# Patient Record
Sex: FEMALE | Race: WHITE | NOT HISPANIC OR LATINO | Employment: UNEMPLOYED | URBAN - METROPOLITAN AREA
[De-identification: names, ages, dates, MRNs, and addresses within clinical notes are randomized per-mention and may not be internally consistent; named-entity substitution may affect disease eponyms.]

---

## 2017-08-30 ENCOUNTER — OFFICE VISIT (OUTPATIENT)
Dept: FAMILY MEDICINE CLINIC | Facility: CLINIC | Age: 47
End: 2017-08-30

## 2017-08-30 VITALS
BODY MASS INDEX: 21.12 KG/M2 | DIASTOLIC BLOOD PRESSURE: 74 MMHG | HEART RATE: 72 BPM | WEIGHT: 114.8 LBS | SYSTOLIC BLOOD PRESSURE: 122 MMHG | TEMPERATURE: 98.2 F | OXYGEN SATURATION: 98 % | HEIGHT: 62 IN

## 2017-08-30 DIAGNOSIS — R06.83 SNORING: ICD-10-CM

## 2017-08-30 DIAGNOSIS — J20.9 ACUTE BRONCHITIS, UNSPECIFIED ORGANISM: ICD-10-CM

## 2017-08-30 DIAGNOSIS — J30.9 ALLERGIC RHINITIS, UNSPECIFIED ALLERGIC RHINITIS TRIGGER, UNSPECIFIED RHINITIS SEASONALITY: Primary | ICD-10-CM

## 2017-08-30 DIAGNOSIS — J01.00 ACUTE MAXILLARY SINUSITIS, RECURRENCE NOT SPECIFIED: ICD-10-CM

## 2017-08-30 DIAGNOSIS — R40.0 DAYTIME SOMNOLENCE: ICD-10-CM

## 2017-08-30 PROCEDURE — 99213 OFFICE O/P EST LOW 20 MIN: CPT | Performed by: NURSE PRACTITIONER

## 2017-08-30 RX ORDER — AZITHROMYCIN 250 MG/1
TABLET, FILM COATED ORAL
Qty: 6 TABLET | Refills: 0 | Status: SHIPPED | OUTPATIENT
Start: 2017-08-30 | End: 2017-10-20

## 2017-08-30 RX ORDER — FLUTICASONE PROPIONATE 50 MCG
2 SPRAY, SUSPENSION (ML) NASAL DAILY
COMMUNITY

## 2017-08-30 RX ORDER — AMOXICILLIN 875 MG/1
875 TABLET, COATED ORAL 2 TIMES DAILY
Qty: 20 TABLET | Refills: 0 | Status: SHIPPED | OUTPATIENT
Start: 2017-08-30 | End: 2017-08-30

## 2017-08-30 NOTE — PROGRESS NOTES
Subjective   Aleena Wilder is a 47 y.o. female.     History of Present Illness     4 day hx fever, chills, cough, chest tightness, PND, nasal congestion, purulent rhinorrhea and sputum.  Life-long pattern of acute bronchitis every spring and fall.  Sees William Amanda MD for allergy tx.      C/o's snoring/daytime somnolence.   believes she has RENETTA--witnesses apneic periods--can't tell me how long they last.  Sleep study 8 yrs ago neg for RENETTA.        The following portions of the patient's history were reviewed and updated as appropriate: allergies, current medications, past family history, past medical history, past social history, past surgical history and problem list.    Review of Systems   Constitutional: Positive for activity change, appetite change, chills, fatigue and fever.   HENT: Positive for congestion, postnasal drip, rhinorrhea and sore throat.    Respiratory: Positive for cough and chest tightness. Negative for wheezing.    All other systems reviewed and are negative.      Objective   Physical Exam   Constitutional: Vital signs are normal. She appears well-developed and well-nourished.  Non-toxic appearance.   HENT:   Head: Normocephalic and atraumatic.   Right Ear: Tympanic membrane is retracted. Tympanic membrane is not erythematous.   Left Ear: Tympanic membrane is retracted. Tympanic membrane is not erythematous.   Nose: Mucosal edema and rhinorrhea present.   Mouth/Throat: Uvula is midline. Posterior oropharyngeal erythema present.   Cardiovascular: Normal rate, regular rhythm, S1 normal, S2 normal and normal heart sounds.    No murmur heard.  Pulmonary/Chest: Effort normal and breath sounds normal. No accessory muscle usage. No respiratory distress. She has no decreased breath sounds. She has no wheezes. She has no rhonchi. She has no rales.   Lymphadenopathy:     She has no cervical adenopathy.   Neurological: She is alert.   Psychiatric: She has a normal mood and affect.   Nursing  note and vitals reviewed.      Assessment/Plan   Aleena was seen today for nasal congestion.    Diagnoses and all orders for this visit:    Allergic rhinitis, unspecified allergic rhinitis trigger, unspecified rhinitis seasonality  -     Ambulatory Referral to Allergy    Snoring  -     Ambulatory Referral to Sleep Medicine    Daytime somnolence  -     Ambulatory Referral to Sleep Medicine    Acute maxillary sinusitis, recurrence not specified    Acute bronchitis, unspecified organism    Other orders  -     Discontinue: amoxicillin (AMOXIL) 875 MG tablet; Take 1 tablet by mouth 2 (Two) Times a Day.  -     azithromycin (ZITHROMAX Z-KENDALL) 250 MG tablet; Take 2 tablets the first day, then 1 tablet daily for 4 days.  -     mometasone-formoterol (DULERA) 100-5 MCG/ACT inhaler; Inhale 2 puffs 2 (Two) Times a Day.        Reactive Airway Disease (RAD) VS Asthma    Supportive care.  RTC if sxs do not improve as expected.

## 2017-10-20 ENCOUNTER — HOSPITAL ENCOUNTER (OUTPATIENT)
Dept: GENERAL RADIOLOGY | Facility: HOSPITAL | Age: 47
Discharge: HOME | End: 2017-10-20
Admitting: FAMILY MEDICINE

## 2017-10-20 ENCOUNTER — OFFICE VISIT (OUTPATIENT)
Dept: FAMILY MEDICINE CLINIC | Facility: CLINIC | Age: 47
End: 2017-10-20

## 2017-10-20 VITALS
BODY MASS INDEX: 21.35 KG/M2 | HEIGHT: 62 IN | SYSTOLIC BLOOD PRESSURE: 120 MMHG | OXYGEN SATURATION: 99 % | TEMPERATURE: 98.2 F | DIASTOLIC BLOOD PRESSURE: 78 MMHG | RESPIRATION RATE: 18 BRPM | HEART RATE: 77 BPM | WEIGHT: 116 LBS

## 2017-10-20 DIAGNOSIS — J40 BRONCHITIS: ICD-10-CM

## 2017-10-20 DIAGNOSIS — J45.31 MILD PERSISTENT REACTIVE AIRWAY DISEASE WITH ACUTE EXACERBATION: Primary | ICD-10-CM

## 2017-10-20 DIAGNOSIS — J30.9 CHRONIC ALLERGIC RHINITIS, UNSPECIFIED SEASONALITY, UNSPECIFIED TRIGGER: ICD-10-CM

## 2017-10-20 PROCEDURE — 71020 HC CHEST PA AND LATERAL: CPT

## 2017-10-20 PROCEDURE — 99214 OFFICE O/P EST MOD 30 MIN: CPT | Performed by: FAMILY MEDICINE

## 2017-10-20 RX ORDER — AMOXICILLIN AND CLAVULANATE POTASSIUM 875; 125 MG/1; MG/1
TABLET, FILM COATED ORAL
Qty: 14 TABLET | Refills: 0 | Status: SHIPPED | OUTPATIENT
Start: 2017-10-20 | End: 2018-01-02

## 2017-10-20 RX ORDER — ALBUTEROL SULFATE 90 UG/1
2 AEROSOL, METERED RESPIRATORY (INHALATION) EVERY 4 HOURS PRN
Qty: 1 INHALER | Refills: 11 | Status: SHIPPED | OUTPATIENT
Start: 2017-10-20 | End: 2018-06-18

## 2017-10-20 RX ORDER — PREDNISONE 10 MG/1
TABLET ORAL
Qty: 18 TABLET | Refills: 0 | Status: SHIPPED | OUTPATIENT
Start: 2017-10-20 | End: 2018-01-02

## 2017-10-20 NOTE — PROGRESS NOTES
"Subjective   Aleena Wilder is a 47 y.o. female.     History of Present Illness Clear nasal drainage, cough, tight chest. Soreness in the back. Slight sputum. Slight wheeze. Has Dulera. Marta T told her thinks she has asthma    . Feels like never got better from 1.5 months ago.ACtivity would wind her.Occ would hear wheezing. Does not have albuterol. NOt sure if she ever had that bc son has an inhaler..  2 days ago chest got more tight. Got more worn out. No sore throat. No sinus pressure. Has had 2 episodes of pneumonia but not in the hosp. First time 10 yr ago and last time 5 yr ago.    Sleep lab appt is next month.  The following portions of the patient's history were reviewed and updated as appropriate: allergies, current medications, past social history and problem list.    Review of Systems   Constitutional: Positive for fatigue. Negative for activity change and unexpected weight change.   HENT: Positive for congestion. Negative for ear pain, postnasal drip and sore throat.    Eyes: Negative for pain and discharge.   Respiratory: Positive for cough, shortness of breath and wheezing. Negative for chest tightness.         Very little sputum   Cardiovascular: Negative for chest pain and palpitations.   Gastrointestinal: Negative for abdominal pain, diarrhea and vomiting.   Endocrine: Negative.    Genitourinary: Negative.    Musculoskeletal: Negative for joint swelling.   Skin: Negative for color change, rash and wound.   Allergic/Immunologic: Negative.    Neurological: Negative for seizures and syncope.   Psychiatric/Behavioral: Negative.        Objective   /78  Pulse 77  Temp 98.2 °F (36.8 °C)  Resp 18  Ht 62\" (157.5 cm)  Wt 116 lb (52.6 kg)  SpO2 99%  BMI 21.22 kg/m2  Physical Exam   Constitutional: She appears well-developed and well-nourished.   HENT:   Head: Normocephalic and atraumatic.   Right Ear: Tympanic membrane, external ear and ear canal normal.   Left Ear: Tympanic membrane, external " ear and ear canal normal.   Mouth/Throat: Oropharynx is clear and moist.   Eyes: Conjunctivae are normal. Right eye exhibits no discharge. Left eye exhibits no discharge.   Neck: Normal range of motion. Neck supple. No thyromegaly present.   Cardiovascular: Normal rate, regular rhythm and normal heart sounds.    Pulmonary/Chest: Effort normal and breath sounds normal. No respiratory distress. She has no wheezes. She has no rales.   Prolonged E:I ratio   Lymphadenopathy:     She has no cervical adenopathy.   Skin: Skin is warm and dry.   Psychiatric: She has a normal mood and affect. Judgment normal.   Vitals reviewed.      Assessment/Plan   Problem List Items Addressed This Visit        Respiratory    Allergic rhinitis      Other Visit Diagnoses     Mild persistent reactive airway disease with acute exacerbation    -  Primary    Relevant Medications    albuterol (PROVENTIL HFA;VENTOLIN HFA) 108 (90 Base) MCG/ACT inhaler    predniSONE (DELTASONE) 10 MG tablet    Bronchitis        Relevant Medications    albuterol (PROVENTIL HFA;VENTOLIN HFA) 108 (90 Base) MCG/ACT inhaler    amoxicillin-clavulanate (AUGMENTIN) 875-125 MG per tablet    predniSONE (DELTASONE) 10 MG tablet    Other Relevant Orders    XR Chest 2 View           May need allergy testing by Dr Amanda in the near future and has an appt to discuss. Of course no testing while on steroids.

## 2017-10-24 ENCOUNTER — OFFICE VISIT (OUTPATIENT)
Dept: SLEEP MEDICINE | Facility: HOSPITAL | Age: 47
End: 2017-10-24
Attending: PSYCHIATRY & NEUROLOGY

## 2017-10-24 VITALS
BODY MASS INDEX: 20.32 KG/M2 | HEART RATE: 74 BPM | HEIGHT: 64 IN | DIASTOLIC BLOOD PRESSURE: 75 MMHG | WEIGHT: 119 LBS | SYSTOLIC BLOOD PRESSURE: 117 MMHG

## 2017-10-24 DIAGNOSIS — R06.83 SNORING: ICD-10-CM

## 2017-10-24 DIAGNOSIS — R40.0 DAYTIME SOMNOLENCE: ICD-10-CM

## 2017-10-24 DIAGNOSIS — G47.33 OSA (OBSTRUCTIVE SLEEP APNEA): Primary | ICD-10-CM

## 2017-10-24 PROCEDURE — G0463 HOSPITAL OUTPT CLINIC VISIT: HCPCS

## 2017-10-24 NOTE — PROGRESS NOTES
Sleep Medicine initial Consultation    Aleena Wilder  : 1970  47 y.o. female   Date of Service: 10/24/2017  Referring provider: GOMEZ Jennings    History Of Present Illness:   Mrs. Aleena Wilder  is a 47 y.o. patient is here for the evaluation of Snoring, Excessive Daytime Sleepiness, Sleep Apnea and Chronic Fatigue.    The patient c/o excessive daytime sleepiness,  and difficulty driving to to sleepiness.    The patient complains of loud in all sleeping positions, has witnessed episodes of sleep apnea, has dry mouth or sore mouth when he wakes up and excessive sweating during sleep.    The patient complains of bruxism during sleep.    The patient complains of has restless sleep, Does not feel rested even after a long sleep and Still feels sleepy even when increasing sleep time.    The patient reports history of frequent nightmares    Sleep schedule: Bedtime: 12 midnight, gets out of bed at 8 AM, sleep latency: 15 minutes, Gets about 8 hours of sleep.    [place] Sleepiness Scale score: 13/24.    History reviewed. No pertinent past medical history.  Past Surgical History:   Procedure Laterality Date   • APPENDECTOMY     • HYSTERECTOMY      endometriosis at age 36     Current Outpatient Prescriptions on File Prior to Visit   Medication Sig Dispense Refill   • albuterol (PROVENTIL HFA;VENTOLIN HFA) 108 (90 Base) MCG/ACT inhaler Inhale 2 puffs Every 4 (Four) Hours As Needed for Wheezing. 1 inhaler 11   • amoxicillin-clavulanate (AUGMENTIN) 875-125 MG per tablet 1 bid with food 14 tablet 0   • fluticasone (FLONASE) 50 MCG/ACT nasal spray 2 sprays into each nostril Daily.     • mometasone-formoterol (DULERA) 100-5 MCG/ACT inhaler Inhale 2 puffs 2 (Two) Times a Day. 1 inhaler 11   • predniSONE (DELTASONE) 10 MG tablet 3 for 3d,2 for 3d, 1 for 3d 18 tablet 0     No current facility-administered medications on file prior to visit.      Allergies   Allergen Reactions   • Tramadol Nausea Only     Other  "reaction(s): Dizziness, Hallucinations     Family History   Problem Relation Age of Onset   • Cancer Other      colon   • Diabetes Other    • Hyperlipidemia Other    • Hypertension Other    • Other Other      cerebraal artery occusion with cerebral infarction   • Colon cancer Paternal Grandfather      Late 60s or early 70s   • Colon cancer Cousin      Colon cancer, 50s, paternal cousin     Social History     Social History   • Marital status:      Spouse name: N/A   • Number of children: N/A   • Years of education: N/A     Occupational History   • Not on file.     Social History Main Topics   • Smoking status: Never Smoker   • Smokeless tobacco: Not on file   • Alcohol use Yes      Comment: social   • Drug use: Defer   • Sexual activity: Defer     Other Topics Concern   • Not on file     Social History Narrative     Review of Systems   HENT: Positive for congestion, postnasal drip, rhinorrhea and voice change.    Respiratory: Positive for apnea, cough, chest tightness, shortness of breath and wheezing.    Psychiatric/Behavioral: Positive for dysphoric mood. The patient is nervous/anxious.    All other systems reviewed and are negative.    Patient examination:  Vitals:    10/24/17 0700   BP: 117/75   Pulse: 74   Weight: 119 lb (54 kg)   Height: 64\" (162.6 cm)     HEENT: Normal and Mallampati Class I: Soft palate, fauces, uvula, pillars visible  Neck: Supple no carotid bruits.  Lungs: Clear to auscultation.  Cardiac exam: Normal and regular rate and rhythm. No murmurs.  Extremities: No edema clubbing or cyanosis.    ASSESSMENT AND PLAN:The patient has symptoms of obstructive sleep apnea syndrome with hypersomnia.  Her [place] Sleepiness Scale score is 13 today.  We will proceed with polysomnography and treated with CPAP therapy if needed.  Sleep hygiene techniques discussed.  Exercise diet and weight loss discussed.    Encounter Diagnoses   Name Primary?   • RENTETA (obstructive sleep apnea) Yes   • Snoring    • " Daytime somnolence      Orders Placed This Encounter   Procedures   • Polysomnography 4 or More Parameters   • Home Sleep Study     Return in about 3 months (around 1/24/2018).    Dewayne Angela MD  10/24/2017  9:05 AM

## 2018-01-02 ENCOUNTER — OFFICE VISIT (OUTPATIENT)
Dept: INTERNAL MEDICINE | Facility: CLINIC | Age: 48
End: 2018-01-02

## 2018-01-02 VITALS
SYSTOLIC BLOOD PRESSURE: 112 MMHG | BODY MASS INDEX: 19.02 KG/M2 | HEIGHT: 64 IN | RESPIRATION RATE: 16 BRPM | OXYGEN SATURATION: 98 % | WEIGHT: 111.4 LBS | DIASTOLIC BLOOD PRESSURE: 72 MMHG | TEMPERATURE: 98.8 F | HEART RATE: 77 BPM

## 2018-01-02 DIAGNOSIS — R53.82 CHRONIC FATIGUE: ICD-10-CM

## 2018-01-02 DIAGNOSIS — Z13.21 ENCOUNTER FOR VITAMIN DEFICIENCY SCREENING: ICD-10-CM

## 2018-01-02 DIAGNOSIS — Z23 NEED FOR VACCINATION: ICD-10-CM

## 2018-01-02 DIAGNOSIS — M54.50 CHRONIC MIDLINE LOW BACK PAIN WITHOUT SCIATICA: ICD-10-CM

## 2018-01-02 DIAGNOSIS — Z00.00 ENCOUNTER FOR PREVENTIVE HEALTH EXAMINATION: Primary | ICD-10-CM

## 2018-01-02 DIAGNOSIS — G89.29 CHRONIC MIDLINE LOW BACK PAIN WITHOUT SCIATICA: ICD-10-CM

## 2018-01-02 PROBLEM — J45.20 MILD INTERMITTENT ASTHMA WITHOUT COMPLICATION: Status: ACTIVE | Noted: 2018-01-02

## 2018-01-02 PROCEDURE — 90471 IMMUNIZATION ADMIN: CPT | Performed by: INTERNAL MEDICINE

## 2018-01-02 PROCEDURE — 90715 TDAP VACCINE 7 YRS/> IM: CPT | Performed by: INTERNAL MEDICINE

## 2018-01-02 PROCEDURE — 99396 PREV VISIT EST AGE 40-64: CPT | Performed by: INTERNAL MEDICINE

## 2018-01-02 RX ORDER — FEXOFENADINE HYDROCHLORIDE 60 MG/1
60 TABLET, FILM COATED ORAL DAILY
COMMUNITY
End: 2018-05-31
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
## 2018-01-03 ENCOUNTER — RESULTS ENCOUNTER (OUTPATIENT)
Dept: INTERNAL MEDICINE | Facility: CLINIC | Age: 48
End: 2018-01-03

## 2018-01-03 DIAGNOSIS — Z00.00 ENCOUNTER FOR PREVENTIVE HEALTH EXAMINATION: ICD-10-CM

## 2018-01-03 DIAGNOSIS — R53.82 CHRONIC FATIGUE: ICD-10-CM

## 2018-01-03 DIAGNOSIS — Z13.21 ENCOUNTER FOR VITAMIN DEFICIENCY SCREENING: ICD-10-CM

## 2018-01-04 LAB
25(OH)D3+25(OH)D2 SERPL-MCNC: 30.9 NG/ML (ref 30–100)
ALBUMIN SERPL-MCNC: 4.8 G/DL (ref 3.5–5.2)
ALBUMIN/GLOB SERPL: 1.7 G/DL
ALP SERPL-CCNC: 87 U/L (ref 39–117)
ALT SERPL-CCNC: 21 U/L (ref 1–33)
AST SERPL-CCNC: 21 U/L (ref 1–32)
BASOPHILS # BLD AUTO: 0.05 10*3/MM3 (ref 0–0.2)
BASOPHILS NFR BLD AUTO: 0.7 % (ref 0–1.5)
BILIRUB SERPL-MCNC: 0.5 MG/DL (ref 0.1–1.2)
BUN SERPL-MCNC: 12 MG/DL (ref 6–20)
BUN/CREAT SERPL: 15.4 (ref 7–25)
CALCIUM SERPL-MCNC: 9.8 MG/DL (ref 8.6–10.5)
CHLORIDE SERPL-SCNC: 100 MMOL/L (ref 98–107)
CHOLEST SERPL-MCNC: 230 MG/DL (ref 0–200)
CO2 SERPL-SCNC: 29.4 MMOL/L (ref 22–29)
CREAT SERPL-MCNC: 0.78 MG/DL (ref 0.57–1)
EOSINOPHIL # BLD AUTO: 0.12 10*3/MM3 (ref 0–0.7)
EOSINOPHIL NFR BLD AUTO: 1.8 % (ref 0.3–6.2)
ERYTHROCYTE [DISTWIDTH] IN BLOOD BY AUTOMATED COUNT: 13.4 % (ref 11.7–13)
GLOBULIN SER CALC-MCNC: 2.8 GM/DL
GLUCOSE SERPL-MCNC: 96 MG/DL (ref 65–99)
HCT VFR BLD AUTO: 42.8 % (ref 35.6–45.5)
HDLC SERPL-MCNC: 71 MG/DL (ref 40–60)
HGB BLD-MCNC: 13.7 G/DL (ref 11.9–15.5)
IMM GRANULOCYTES # BLD: 0 10*3/MM3 (ref 0–0.03)
IMM GRANULOCYTES NFR BLD: 0 % (ref 0–0.5)
LDLC SERPL CALC-MCNC: 140 MG/DL (ref 0–100)
LYMPHOCYTES # BLD AUTO: 1.88 10*3/MM3 (ref 0.9–4.8)
LYMPHOCYTES NFR BLD AUTO: 28.1 % (ref 19.6–45.3)
MCH RBC QN AUTO: 30.1 PG (ref 26.9–32)
MCHC RBC AUTO-ENTMCNC: 32 G/DL (ref 32.4–36.3)
MCV RBC AUTO: 94.1 FL (ref 80.5–98.2)
MONOCYTES # BLD AUTO: 0.37 10*3/MM3 (ref 0.2–1.2)
MONOCYTES NFR BLD AUTO: 5.5 % (ref 5–12)
NEUTROPHILS # BLD AUTO: 4.26 10*3/MM3 (ref 1.9–8.1)
NEUTROPHILS NFR BLD AUTO: 63.9 % (ref 42.7–76)
PLATELET # BLD AUTO: 268 10*3/MM3 (ref 140–500)
POTASSIUM SERPL-SCNC: 3.8 MMOL/L (ref 3.5–5.2)
PROT SERPL-MCNC: 7.6 G/DL (ref 6–8.5)
RBC # BLD AUTO: 4.55 10*6/MM3 (ref 3.9–5.2)
SODIUM SERPL-SCNC: 143 MMOL/L (ref 136–145)
T4 FREE SERPL-MCNC: 1.21 NG/DL (ref 0.93–1.7)
TRIGL SERPL-MCNC: 96 MG/DL (ref 0–150)
TSH SERPL DL<=0.005 MIU/L-ACNC: 0.98 MIU/ML (ref 0.27–4.2)
VLDLC SERPL CALC-MCNC: 19.2 MG/DL (ref 5–40)
WBC # BLD AUTO: 6.68 10*3/MM3 (ref 4.5–10.7)

## 2018-01-17 ENCOUNTER — APPOINTMENT (OUTPATIENT)
Dept: WOMENS IMAGING | Facility: HOSPITAL | Age: 48
End: 2018-01-17

## 2018-01-17 PROCEDURE — 77067 SCR MAMMO BI INCL CAD: CPT | Performed by: RADIOLOGY

## 2018-01-17 PROCEDURE — 77063 BREAST TOMOSYNTHESIS BI: CPT | Performed by: RADIOLOGY

## 2018-01-18 ENCOUNTER — HOSPITAL ENCOUNTER (OUTPATIENT)
Dept: SLEEP MEDICINE | Facility: HOSPITAL | Age: 48
Discharge: HOME | End: 2018-01-18
Attending: PSYCHIATRY & NEUROLOGY | Admitting: PSYCHIATRY & NEUROLOGY

## 2018-01-18 DIAGNOSIS — R06.83 SNORING: ICD-10-CM

## 2018-01-18 DIAGNOSIS — R40.0 DAYTIME SOMNOLENCE: ICD-10-CM

## 2018-01-18 DIAGNOSIS — G47.33 OSA (OBSTRUCTIVE SLEEP APNEA): ICD-10-CM

## 2018-01-18 PROCEDURE — 95811 POLYSOM 6/>YRS CPAP 4/> PARM: CPT

## 2018-01-23 ENCOUNTER — TELEPHONE (OUTPATIENT)
Dept: SLEEP MEDICINE | Facility: HOSPITAL | Age: 48
End: 2018-01-23

## 2018-01-23 NOTE — TELEPHONE ENCOUNTER
Spoke with pt about sleep study results, sending order to naps per patient. Scheduled follow up appointment

## 2018-03-27 ENCOUNTER — APPOINTMENT (OUTPATIENT)
Dept: SLEEP MEDICINE | Facility: HOSPITAL | Age: 48
End: 2018-03-27
Attending: PSYCHIATRY & NEUROLOGY

## 2018-04-24 ENCOUNTER — OFFICE VISIT (OUTPATIENT)
Dept: SLEEP MEDICINE | Facility: HOSPITAL | Age: 48
End: 2018-04-24
Attending: PSYCHIATRY & NEUROLOGY

## 2018-04-24 VITALS
BODY MASS INDEX: 21.16 KG/M2 | SYSTOLIC BLOOD PRESSURE: 95 MMHG | DIASTOLIC BLOOD PRESSURE: 64 MMHG | HEART RATE: 83 BPM | WEIGHT: 115 LBS | HEIGHT: 62 IN

## 2018-04-24 DIAGNOSIS — R06.83 SNORING: ICD-10-CM

## 2018-04-24 DIAGNOSIS — R40.0 DAYTIME SOMNOLENCE: ICD-10-CM

## 2018-04-24 DIAGNOSIS — G47.33 OSA (OBSTRUCTIVE SLEEP APNEA): Primary | ICD-10-CM

## 2018-04-24 PROCEDURE — G0463 HOSPITAL OUTPT CLINIC VISIT: HCPCS

## 2018-04-24 NOTE — PROGRESS NOTES
"Sleep Medicine Follow-up visit Note    Name: Aleena Wilder  Age: 47 y.o.  Sex: female  :  1970  MRN: [id number]  Date of Service: 2018    Requesting Physician: Dewayne Angela Md  [address] [place] [id number]  .       History of Present Illness:   Aleena Wilder is a 47 y.o. female comes for a follow up visit.     Patients Polysomnography has revealed RENETTA with an AHI of 19.2 per hour of sleep. minimum SPO2  was 84 %. [place] Sleepiness Scale score prior to CPAP therapy was 13/24.    The patient has Moderately obstructive sleep apnea. The patient is on auto CPAP therapy at a mean pressure of 7.9 cms of water. Residual AHI 3.9/ sleep hour. Compliance data to indicate 83.3% usage for more than 4 hours with an average usage of 6 hours and 13 minutes.  [place] sleepiness scale score with CPAP therapy is 15/24 with CPAP therapy.     Review of Systems - Negative except anxiety.    PMH, Surgical Hx, current Medications, Allergies, Family Hx, Social Hx and problem list were reviewed and updated in the chart.    Objective:  Vitals:    18 1136   BP: 95/64   Pulse: 83   Weight: 52.2 kg (115 lb)   Height: 157.5 cm (62\")    Body mass index is 21.03 kg/m².       GEN: No significant distress, the patient is well developed, well nourished.  HEENT: pupils equal, round and reactive to light, extraocular movements intact, conjunctiva not injected bilaterally, nasopharyngeal mucosa moist, no lesions appreciated, Mallampati score I (soft palate, uvula, fauces, and tonsillar pillars visible)  NECK: Supple, no jugular venous distention, no thyromegaly, no bruits appreciated  LUNGS: Clear to auscultation bilaterally.    CV: regular rate and rhythm, no gallops, murmurs or rubs  EXT: no cyanosis, clubbing, or edema   NEURO: alert and oriented x 3, motor functions grossly intact, CN II-XII grossly intact.    The patient's polysomnography study in 2018 revealed:1. Moderately  severe " Obstructive sleep apnea with an Apnea hypopnea index of 19.2 events per sleep hour. The lowest oxygen saturation was 84.0%. The oxygen was below 90% for 2.7% total sleep time. 2. With CPAP / BIPAP treatment, this study revealed improvement in sleep architecture, respiratory events and hypoxia and the best improvement was seen at a pressure of 8 centimeters of water.  At this pressure the Apnea/ hypopnea Index was 0.8 per sleep hour and the Oxygen Saturation remained 93.4% or above.     Assessment and PLAN:   The patient has moderately severe obstructive sleep apnea syndrome and is on CPAP. The patient is compliant and is benefiting from it.  I will continue present CPAP therapy and will see the patient for follow-up in one year.    I have discussed the findings of my evaluation with the patient at length, instructed the patient on basic sleep hygiene, stressing the importance of regular sleep schedule without naps and with 8 hours of sleep per night, avoiding alcohol and sedative medications, limiting caffeine consumption, and implementing a healthy diet and exercise program and not to drive if patient is sleepy.       Dewayne Angela MD  4/24/2018  11:42 AM

## 2018-05-31 ENCOUNTER — OFFICE VISIT (OUTPATIENT)
Dept: INTERNAL MEDICINE | Facility: CLINIC | Age: 48
End: 2018-05-31

## 2018-05-31 VITALS
HEART RATE: 67 BPM | TEMPERATURE: 98.8 F | BODY MASS INDEX: 21.2 KG/M2 | HEIGHT: 62 IN | DIASTOLIC BLOOD PRESSURE: 70 MMHG | WEIGHT: 115.2 LBS | OXYGEN SATURATION: 94 % | SYSTOLIC BLOOD PRESSURE: 108 MMHG | RESPIRATION RATE: 16 BRPM

## 2018-05-31 DIAGNOSIS — R10.31 RIGHT LOWER QUADRANT ABDOMINAL PAIN: Primary | ICD-10-CM

## 2018-05-31 PROBLEM — N80.9 ENDOMETRIOSIS: Status: ACTIVE | Noted: 2018-05-31

## 2018-05-31 LAB
ALBUMIN SERPL-MCNC: 4.9 G/DL (ref 3.5–5.2)
ALBUMIN/GLOB SERPL: 1.7 G/DL
ALP SERPL-CCNC: 80 U/L (ref 39–117)
ALT SERPL-CCNC: 15 U/L (ref 1–33)
AST SERPL-CCNC: 17 U/L (ref 1–32)
BASOPHILS # BLD AUTO: 0.06 10*3/MM3 (ref 0–0.2)
BASOPHILS NFR BLD AUTO: 0.9 % (ref 0–1.5)
BILIRUB BLD-MCNC: NEGATIVE MG/DL
BILIRUB SERPL-MCNC: 0.6 MG/DL (ref 0.1–1.2)
BUN SERPL-MCNC: 13 MG/DL (ref 6–20)
BUN/CREAT SERPL: 14.6 (ref 7–25)
CALCIUM SERPL-MCNC: 9.9 MG/DL (ref 8.6–10.5)
CHLORIDE SERPL-SCNC: 99 MMOL/L (ref 98–107)
CLARITY, POC: CLEAR
CO2 SERPL-SCNC: 28.9 MMOL/L (ref 22–29)
COLOR UR: YELLOW
CREAT SERPL-MCNC: 0.89 MG/DL (ref 0.57–1)
EOSINOPHIL # BLD AUTO: 0.13 10*3/MM3 (ref 0–0.7)
EOSINOPHIL NFR BLD AUTO: 2 % (ref 0.3–6.2)
ERYTHROCYTE [DISTWIDTH] IN BLOOD BY AUTOMATED COUNT: 13.2 % (ref 11.7–13)
GFR SERPLBLD CREATININE-BSD FMLA CKD-EPI: 68 ML/MIN/1.73
GFR SERPLBLD CREATININE-BSD FMLA CKD-EPI: 82 ML/MIN/1.73
GLOBULIN SER CALC-MCNC: 2.9 GM/DL
GLUCOSE SERPL-MCNC: 119 MG/DL (ref 65–99)
GLUCOSE UR STRIP-MCNC: NEGATIVE MG/DL
HCT VFR BLD AUTO: 40.7 % (ref 35.6–45.5)
HGB BLD-MCNC: 13.5 G/DL (ref 11.9–15.5)
IMM GRANULOCYTES # BLD: 0 10*3/MM3 (ref 0–0.03)
IMM GRANULOCYTES NFR BLD: 0 % (ref 0–0.5)
KETONES UR QL: NEGATIVE
LEUKOCYTE EST, POC: NEGATIVE
LYMPHOCYTES # BLD AUTO: 2.67 10*3/MM3 (ref 0.9–4.8)
LYMPHOCYTES NFR BLD AUTO: 40.7 % (ref 19.6–45.3)
MCH RBC QN AUTO: 29.9 PG (ref 26.9–32)
MCHC RBC AUTO-ENTMCNC: 33.2 G/DL (ref 32.4–36.3)
MCV RBC AUTO: 90.2 FL (ref 80.5–98.2)
MONOCYTES # BLD AUTO: 0.2 10*3/MM3 (ref 0.2–1.2)
MONOCYTES NFR BLD AUTO: 3 % (ref 5–12)
NEUTROPHILS # BLD AUTO: 3.5 10*3/MM3 (ref 1.9–8.1)
NEUTROPHILS NFR BLD AUTO: 53.4 % (ref 42.7–76)
NITRITE UR-MCNC: NEGATIVE MG/ML
PH UR: 6.5 [PH] (ref 5–8)
PLATELET # BLD AUTO: 253 10*3/MM3 (ref 140–500)
POTASSIUM SERPL-SCNC: 3.9 MMOL/L (ref 3.5–5.2)
PROT SERPL-MCNC: 7.8 G/DL (ref 6–8.5)
PROT UR STRIP-MCNC: NEGATIVE MG/DL
RBC # BLD AUTO: 4.51 10*6/MM3 (ref 3.9–5.2)
RBC # UR STRIP: NEGATIVE /UL
SODIUM SERPL-SCNC: 142 MMOL/L (ref 136–145)
SP GR UR: 1.02 (ref 1–1.03)
UROBILINOGEN UR QL: ABNORMAL
WBC # BLD AUTO: 6.56 10*3/MM3 (ref 4.5–10.7)

## 2018-05-31 PROCEDURE — 81003 URINALYSIS AUTO W/O SCOPE: CPT | Performed by: INTERNAL MEDICINE

## 2018-05-31 PROCEDURE — 99214 OFFICE O/P EST MOD 30 MIN: CPT | Performed by: INTERNAL MEDICINE

## 2018-05-31 NOTE — PROGRESS NOTES
Subjective   Aleena Wilder is a 47 y.o. female.     Chief Complaint   Patient presents with   • Abdominal Pain         Abdominal Pain   This is a new problem. The current episode started in the past 7 days. The onset quality is gradual. The problem occurs constantly. The problem has been waxing and waning. The pain is located in the RLQ. The pain is moderate. The quality of the pain is colicky. The abdominal pain radiates to the right flank. Associated symptoms include diarrhea and nausea. Pertinent negatives include no fever or vomiting. Nothing aggravates the pain. The pain is relieved by nothing. Her past medical history is significant for irritable bowel syndrome.        The following portions of the patient's history were reviewed and updated as appropriate: allergies, current medications, past social history and problem list.    Outpatient Prescriptions Marked as Taking for the 5/31/18 encounter (Office Visit) with Felix Quan MD   Medication Sig Dispense Refill   • albuterol (PROVENTIL HFA;VENTOLIN HFA) 108 (90 Base) MCG/ACT inhaler Inhale 2 puffs Every 4 (Four) Hours As Needed for Wheezing. 1 inhaler 11   • fluticasone (FLONASE) 50 MCG/ACT nasal spray 2 sprays into each nostril Daily.     • [DISCONTINUED] fexofenadine (ALLEGRA) 60 MG tablet Take 60 mg by mouth Daily.         Review of Systems   Constitutional: Negative for chills and fever.   Gastrointestinal: Positive for abdominal pain, diarrhea and nausea. Negative for vomiting.       Objective   Vitals:    05/31/18 1130   BP: 108/70   Pulse: 67   Resp: 16   Temp: 98.8 °F (37.1 °C)   SpO2: 94%      1    05/31/18  1130   Weight: 52.3 kg (115 lb 3.2 oz)    [unfilled]  Body mass index is 21.06 kg/m².      Physical Exam   Constitutional: She appears well-developed and well-nourished. No distress.   HENT:   Head: Normocephalic and atraumatic.   Neck: Carotid bruit is not present.   Pulmonary/Chest: Effort normal and breath sounds normal. No  respiratory distress. She has no wheezes. She has no rales.   Abdominal: Soft. Bowel sounds are normal. She exhibits no mass. There is no tenderness. There is no guarding.         Problem List Items Addressed This Visit     None      Visit Diagnoses     Right lower quadrant abdominal pain    -  Primary        Assessment/Plan   In with 1 week of intermittent pain in the right lower quadrant.  It's really right mid quadrant pain.  Sometimes radiates around to the right flank region.  She's had a history of endometriosis.  History of complete abdominal hysterectomy and bilateral salpingo-oophorectomy along with appendectomy.  She has very mild tenderness in the right mid abdomen area.  Not in the right upper quadrant itself.  We'll check a urinalysis today along with a CBC and sedimentation rate.  Hepatic profile.  Clinically I think adhesions would be the best diagnosis here.  That and intermittent partial bowel obstruction.  She is interested in a colonoscopy given family history of colon cancer in grandfather.  We'll check some plain films today including flat and upright.  His CT scan of the abdomen would be the next and discussed diagnostic maneuver.           .[email or web address]  Dragon disclaimer:   Much of this encounter note is an electronic transcription/translation of spoken language to printed text. The electronic translation of spoken language may permit erroneous, or at times, nonsensical words or phrases to be inadvertently transcribed; Although I have reviewed the note for such errors, some may still exist.

## 2018-06-01 ENCOUNTER — TELEPHONE (OUTPATIENT)
Dept: INTERNAL MEDICINE | Facility: CLINIC | Age: 48
End: 2018-06-01

## 2018-06-01 DIAGNOSIS — R10.31 RIGHT LOWER QUADRANT PAIN: Primary | ICD-10-CM

## 2018-06-07 ENCOUNTER — TELEPHONE (OUTPATIENT)
Dept: INTERNAL MEDICINE | Facility: CLINIC | Age: 48
End: 2018-06-07

## 2018-06-07 NOTE — TELEPHONE ENCOUNTER
Patient was see 5-31-18 with intermittent R lower quadrant pain. You ordered XR abdomen and wanted the films that day. Per your office note, next step would be CT scan. Patient did not have XR done, but has a CT abdomen/pelvis scheduled for 6-9-18. Do you want patient to have XR done prior to CT? Please advise.     Just do the CT.  It will be better and more definitive.

## 2018-06-09 ENCOUNTER — HOSPITAL ENCOUNTER (OUTPATIENT)
Dept: CT IMAGING | Facility: HOSPITAL | Age: 48
Discharge: HOME | End: 2018-06-09
Attending: INTERNAL MEDICINE | Admitting: INTERNAL MEDICINE

## 2018-06-09 PROCEDURE — 74177 CT ABD & PELVIS W/CONTRAST: CPT

## 2018-06-09 PROCEDURE — 25010000002 IOPAMIDOL 61 % SOLUTION: Performed by: INTERNAL MEDICINE

## 2018-06-09 RX ADMIN — IOPAMIDOL 85 ML: 612 INJECTION, SOLUTION INTRAVENOUS at 09:30

## 2018-06-11 DIAGNOSIS — R10.31 RIGHT LOWER QUADRANT PAIN: Primary | ICD-10-CM

## 2018-06-18 ENCOUNTER — OFFICE VISIT (OUTPATIENT)
Dept: SURGERY | Facility: CLINIC | Age: 48
End: 2018-06-18

## 2018-06-18 VITALS — HEART RATE: 84 BPM | OXYGEN SATURATION: 98 % | WEIGHT: 116 LBS | BODY MASS INDEX: 21.35 KG/M2 | HEIGHT: 62 IN

## 2018-06-18 DIAGNOSIS — R10.31 RLQ ABDOMINAL PAIN: Primary | ICD-10-CM

## 2018-06-18 PROCEDURE — 99203 OFFICE O/P NEW LOW 30 MIN: CPT | Performed by: SURGERY

## 2018-06-18 NOTE — PROGRESS NOTES
Cc: Right lower quadrant abdominal pain    History of presenting illness:   This is a very nice, generally healthy 47-year-old lady who reports about one month of intermittent right lower quadrant abdominal pain associated with some cramping.  She has some occasional diarrhea, but this has been a longer term issue.  She says the pain is reminiscent of that which she had around 12 years ago around the time of her endometriosis, but it seemed to go away for about 10 years, only intermittently causing any significant discomfort.  She has not had any other weight loss or change in appetite.  No bloody stools.  No significant radiation of the discomfort.  The pain has actually gotten slightly better the last few days and the patient is unclear why this may be.    Past Medical History: Sleep apnea, renal stone disease    Past Surgical History: Appendectomy, hysterectomy and oophorectomy    Medications: Flonase, multivitamin    Allergies: Amoxicillin, tramadol    Social History: Active patient, nonsmoker, drinks 1-2 alcoholic beverages per week    Family History: Breast cancer in her mother, negative for colorectal cancer    Review of Systems:  Constitutional: Positive for fatigue, negative for weight change or chills  Neck: no swollen glands or dysphagia or odynophagia  Respiratory: negative for SOB, cough, hemoptysis or wheezing  Cardiovascular: negative for chest pain, palpitations or peripheral edema  Gastrointestinal: Positive for abdominal pain and diarrhea, negative for rectal bleeding, reflux or abdominal distention      Physical Exam:    General: alert and oriented, appropriate, no acute distress  Neck: Supple without lymphadenopathy or thyromegaly, trachea is in the midline  Respiratory: Lungs are clear bilaterally without wheezing, no use of accessory muscles is noted  Cardiovascular: Regular rate and rhythm without murmur, no peripheral edema  Gastrointestinal: Soft, benign, minimal right-sided tenderness,  certainly no guarding or rebound, no hernia appreciated.    Laboratory data: Recent laboratory data demonstrates a white blood cell count of 6.5 and normal hemoglobin.  Chemistry unremarkable.    Imaging data: CT imaging of the abdomen and pelvis reviewed by me personally.  Multiple small renal stones.  No evidence for hernia or mass.  Bowel appears unremarkable.      Assessment and plan:   Right lower quadrant abdominal pain, etiology unclear  No prior history of colonoscopy.  In light of recent recommendation changes for screening colonoscopy at age 45 I have recommended endoscopy for the patient for this purpose into ensure there is no other source for this right lower quadrant pain.  More likely than not it is either functional or possibly related to adhesive disease, although that seems less likely.  The risks and benefits of colonoscopy were outlined and discussed with the patient who agrees to proceed.      Armen Salazar MD, FACS  General, Minimally Invasive and Endoscopic Surgery  [facility]    [address], [address]  [place], [place], [address]  P: [phone]  F: [phone]

## 2018-11-01 ENCOUNTER — OFFICE VISIT (OUTPATIENT)
Dept: INTERNAL MEDICINE | Facility: CLINIC | Age: 48
End: 2018-11-01

## 2018-11-01 ENCOUNTER — APPOINTMENT (OUTPATIENT)
Dept: LAB | Facility: HOSPITAL | Age: 48
End: 2018-11-01

## 2018-11-01 VITALS
SYSTOLIC BLOOD PRESSURE: 112 MMHG | HEART RATE: 86 BPM | BODY MASS INDEX: 22.82 KG/M2 | OXYGEN SATURATION: 96 % | TEMPERATURE: 99.3 F | HEIGHT: 62 IN | RESPIRATION RATE: 16 BRPM | DIASTOLIC BLOOD PRESSURE: 74 MMHG | WEIGHT: 124 LBS

## 2018-11-01 DIAGNOSIS — J06.9 ACUTE URI: Primary | ICD-10-CM

## 2018-11-01 LAB
B PERT DNA SPEC QL NAA+PROBE: NOT DETECTED
C PNEUM DNA NPH QL NAA+NON-PROBE: NOT DETECTED
FLUAV H1 2009 PAND RNA NPH QL NAA+PROBE: NOT DETECTED
FLUAV H1 HA GENE NPH QL NAA+PROBE: NOT DETECTED
FLUAV H3 RNA NPH QL NAA+PROBE: NOT DETECTED
FLUAV SUBTYP SPEC NAA+PROBE: NOT DETECTED
FLUBV RNA ISLT QL NAA+PROBE: NOT DETECTED
HADV DNA SPEC NAA+PROBE: NOT DETECTED
HCOV 229E RNA SPEC QL NAA+PROBE: NOT DETECTED
HCOV HKU1 RNA SPEC QL NAA+PROBE: NOT DETECTED
HCOV NL63 RNA SPEC QL NAA+PROBE: NOT DETECTED
HCOV OC43 RNA SPEC QL NAA+PROBE: NOT DETECTED
HMPV RNA NPH QL NAA+NON-PROBE: NOT DETECTED
HPIV1 RNA SPEC QL NAA+PROBE: NOT DETECTED
HPIV2 RNA SPEC QL NAA+PROBE: NOT DETECTED
HPIV3 RNA NPH QL NAA+PROBE: NOT DETECTED
HPIV4 P GENE NPH QL NAA+PROBE: NOT DETECTED
M PNEUMO IGG SER IA-ACNC: NOT DETECTED
RHINOVIRUS RNA SPEC NAA+PROBE: DETECTED
RSV RNA NPH QL NAA+NON-PROBE: NOT DETECTED

## 2018-11-01 PROCEDURE — 99213 OFFICE O/P EST LOW 20 MIN: CPT | Performed by: INTERNAL MEDICINE

## 2018-11-01 PROCEDURE — 87798 DETECT AGENT NOS DNA AMP: CPT | Performed by: INTERNAL MEDICINE

## 2018-11-01 PROCEDURE — 87486 CHLMYD PNEUM DNA AMP PROBE: CPT | Performed by: INTERNAL MEDICINE

## 2018-11-01 PROCEDURE — 87581 M.PNEUMON DNA AMP PROBE: CPT | Performed by: INTERNAL MEDICINE

## 2018-11-01 PROCEDURE — 87633 RESP VIRUS 12-25 TARGETS: CPT | Performed by: INTERNAL MEDICINE

## 2018-11-01 NOTE — PROGRESS NOTES
Subjective   Aleena Wilder is a 48 y.o. female.     Chief Complaint   Patient presents with   • Generalized Body Aches   • Chills         Sore Throat    This is a new problem. The current episode started yesterday. The problem has been gradually worsening. Neither side of throat is experiencing more pain than the other. There has been no fever. Associated symptoms include abdominal pain, congestion and a hoarse voice. Pertinent negatives include no coughing, headaches or shortness of breath. She has tried nothing for the symptoms.        The following portions of the patient's history were reviewed and updated as appropriate: allergies, current medications, past social history and problem list.    Outpatient Prescriptions Marked as Taking for the 11/1/18 encounter (Office Visit) with Felix Quan MD   Medication Sig Dispense Refill   • ALLERGY SERUM INJECTION Inject  under the skin 1 (One) Time.     • fluticasone (FLONASE) 50 MCG/ACT nasal spray 2 sprays into each nostril Daily.     • Multiple Vitamins-Minerals (MULTIVITAMIN PO) Take 1 tablet by mouth Daily.         Review of Systems   Constitutional: Negative for chills and fever.   HENT: Positive for congestion, hoarse voice, postnasal drip, sinus pressure and sore throat.    Respiratory: Negative for cough and shortness of breath.    Gastrointestinal: Positive for abdominal pain and nausea.   Neurological: Negative for headaches.       Objective   Vitals:    11/01/18 1459   BP: 112/74   Pulse: 86   Resp: 16   Temp: 99.3 °F (37.4 °C)   SpO2: 96%      1    11/01/18  1459   Weight: 56.2 kg (124 lb)    [unfilled]  Body mass index is 22.67 kg/m².      Physical Exam   Constitutional: She appears well-developed and well-nourished.   HENT:   Head: Normocephalic and atraumatic.   Right Ear: External ear normal.   Left Ear: External ear normal.   Nose: Nose normal.   Mouth/Throat: Oropharynx is clear and moist.   Eyes: Pupils are equal, round, and reactive to  light. Conjunctivae are normal.   Pulmonary/Chest: Effort normal and breath sounds normal. No respiratory distress. She has no wheezes. She has no rales.   Skin: Skin is warm and dry.         Problem List Items Addressed This Visit     None      Visit Diagnoses     Acute URI    -  Primary        Assessment/Plan   Began 2 days ago with some upset stomach.  Nausea.  Cramps and gas.  She developed sore throat last night.  A little feverish.  Some chest congestion and headache congestion.  Body aches.  No headaches.  Exam is unremarkable.  Symptoms are nonspecific.  3 children at home.  None of them are ill.  They are homeschooled.  We'll check a respiratory panel today.           .bmifollowup  Dragon disclaimer:   Much of this encounter note is an electronic transcription/translation of spoken language to printed text. The electronic translation of spoken language may permit erroneous, or at times, nonsensical words or phrases to be inadvertently transcribed; Although I have reviewed the note for such errors, some may still exist.

## 2019-01-08 ENCOUNTER — OFFICE VISIT (OUTPATIENT)
Dept: INTERNAL MEDICINE | Facility: CLINIC | Age: 49
End: 2019-01-08

## 2019-01-08 DIAGNOSIS — Z00.00 ENCOUNTER FOR PREVENTIVE HEALTH EXAMINATION: Primary | ICD-10-CM

## 2019-01-08 LAB
25(OH)D3+25(OH)D2 SERPL-MCNC: 30.1 NG/ML (ref 30–100)
ALBUMIN SERPL-MCNC: 4.5 G/DL (ref 3.5–5.2)
ALBUMIN/GLOB SERPL: 1.6 G/DL
ALP SERPL-CCNC: 80 U/L (ref 39–117)
ALT SERPL-CCNC: 12 U/L (ref 1–33)
AST SERPL-CCNC: 21 U/L (ref 1–32)
BASOPHILS # BLD AUTO: 0.06 10*3/MM3 (ref 0–0.2)
BASOPHILS NFR BLD AUTO: 1.2 % (ref 0–1.5)
BILIRUB SERPL-MCNC: 0.6 MG/DL (ref 0.1–1.2)
BUN SERPL-MCNC: 12 MG/DL (ref 6–20)
BUN/CREAT SERPL: 17.4 (ref 7–25)
CALCIUM SERPL-MCNC: 9.8 MG/DL (ref 8.6–10.5)
CHLORIDE SERPL-SCNC: 103 MMOL/L (ref 98–107)
CHOLEST SERPL-MCNC: 225 MG/DL (ref 0–200)
CO2 SERPL-SCNC: 24.2 MMOL/L (ref 22–29)
CREAT SERPL-MCNC: 0.69 MG/DL (ref 0.57–1)
EOSINOPHIL # BLD AUTO: 0.12 10*3/MM3 (ref 0–0.7)
EOSINOPHIL NFR BLD AUTO: 2.4 % (ref 0.3–6.2)
ERYTHROCYTE [DISTWIDTH] IN BLOOD BY AUTOMATED COUNT: 13.3 % (ref 11.7–13)
GLOBULIN SER CALC-MCNC: 2.9 GM/DL
GLUCOSE SERPL-MCNC: 94 MG/DL (ref 65–99)
HCT VFR BLD AUTO: 40 % (ref 35.6–45.5)
HDLC SERPL-MCNC: 73 MG/DL (ref 40–60)
HGB BLD-MCNC: 13 G/DL (ref 11.9–15.5)
IMM GRANULOCYTES # BLD AUTO: 0 10*3/MM3 (ref 0–0.03)
IMM GRANULOCYTES NFR BLD AUTO: 0 % (ref 0–0.5)
LDLC SERPL CALC-MCNC: 135 MG/DL (ref 0–100)
LYMPHOCYTES # BLD AUTO: 1.98 10*3/MM3 (ref 0.9–4.8)
LYMPHOCYTES NFR BLD AUTO: 39.1 % (ref 19.6–45.3)
MCH RBC QN AUTO: 30 PG (ref 26.9–32)
MCHC RBC AUTO-ENTMCNC: 32.5 G/DL (ref 32.4–36.3)
MCV RBC AUTO: 92.4 FL (ref 80.5–98.2)
MONOCYTES # BLD AUTO: 0.24 10*3/MM3 (ref 0.2–1.2)
MONOCYTES NFR BLD AUTO: 4.7 % (ref 5–12)
NEUTROPHILS # BLD AUTO: 2.67 10*3/MM3 (ref 1.9–8.1)
NEUTROPHILS NFR BLD AUTO: 52.6 % (ref 42.7–76)
PLATELET # BLD AUTO: 235 10*3/MM3 (ref 140–500)
POTASSIUM SERPL-SCNC: 4 MMOL/L (ref 3.5–5.2)
PROT SERPL-MCNC: 7.4 G/DL (ref 6–8.5)
RBC # BLD AUTO: 4.33 10*6/MM3 (ref 3.9–5.2)
SODIUM SERPL-SCNC: 143 MMOL/L (ref 136–145)
T4 FREE SERPL-MCNC: 1.34 NG/DL (ref 0.93–1.7)
TRIGL SERPL-MCNC: 83 MG/DL (ref 0–150)
TSH SERPL DL<=0.005 MIU/L-ACNC: 0.84 MIU/ML (ref 0.27–4.2)
VLDLC SERPL CALC-MCNC: 16.6 MG/DL (ref 5–40)
WBC # BLD AUTO: 5.07 10*3/MM3 (ref 4.5–10.7)

## 2019-01-10 ENCOUNTER — OFFICE VISIT (OUTPATIENT)
Dept: INTERNAL MEDICINE | Facility: CLINIC | Age: 49
End: 2019-01-10

## 2019-01-10 VITALS
OXYGEN SATURATION: 98 % | BODY MASS INDEX: 22.45 KG/M2 | DIASTOLIC BLOOD PRESSURE: 70 MMHG | HEART RATE: 74 BPM | RESPIRATION RATE: 16 BRPM | HEIGHT: 62 IN | TEMPERATURE: 99.2 F | SYSTOLIC BLOOD PRESSURE: 102 MMHG | WEIGHT: 122 LBS

## 2019-01-10 DIAGNOSIS — Z00.00 ENCOUNTER FOR PREVENTIVE HEALTH EXAMINATION: Primary | ICD-10-CM

## 2019-01-10 DIAGNOSIS — J45.20 MILD INTERMITTENT ASTHMA WITHOUT COMPLICATION: ICD-10-CM

## 2019-01-10 DIAGNOSIS — G47.33 OSA (OBSTRUCTIVE SLEEP APNEA): ICD-10-CM

## 2019-01-10 LAB
BILIRUB BLD-MCNC: NEGATIVE MG/DL
CLARITY, POC: CLEAR
COLOR UR: YELLOW
GLUCOSE UR STRIP-MCNC: NEGATIVE MG/DL
KETONES UR QL: NEGATIVE
LEUKOCYTE EST, POC: NEGATIVE
NITRITE UR-MCNC: NEGATIVE MG/ML
PH UR: 7 [PH] (ref 5–8)
PROT UR STRIP-MCNC: NEGATIVE MG/DL
RBC # UR STRIP: NEGATIVE /UL
SP GR UR: 1.01 (ref 1–1.03)
UROBILINOGEN UR QL: NORMAL

## 2019-01-10 PROCEDURE — 81003 URINALYSIS AUTO W/O SCOPE: CPT | Performed by: INTERNAL MEDICINE

## 2019-01-10 PROCEDURE — 99396 PREV VISIT EST AGE 40-64: CPT | Performed by: INTERNAL MEDICINE
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
## 2019-01-31 ENCOUNTER — OFFICE VISIT (OUTPATIENT)
Dept: INTERNAL MEDICINE | Facility: CLINIC | Age: 49
End: 2019-01-31

## 2019-01-31 VITALS
OXYGEN SATURATION: 97 % | RESPIRATION RATE: 16 BRPM | WEIGHT: 124.6 LBS | HEIGHT: 62 IN | TEMPERATURE: 99.8 F | DIASTOLIC BLOOD PRESSURE: 60 MMHG | HEART RATE: 92 BPM | BODY MASS INDEX: 22.93 KG/M2 | SYSTOLIC BLOOD PRESSURE: 102 MMHG

## 2019-01-31 DIAGNOSIS — R68.89 FLU-LIKE SYMPTOMS: Primary | ICD-10-CM

## 2019-01-31 PROCEDURE — 99213 OFFICE O/P EST LOW 20 MIN: CPT | Performed by: INTERNAL MEDICINE

## 2019-01-31 RX ORDER — SULFAMETHOXAZOLE AND TRIMETHOPRIM 800; 160 MG/1; MG/1
1 TABLET ORAL
COMMUNITY
Start: 2019-01-26 | End: 2019-02-02

## 2019-01-31 NOTE — PROGRESS NOTES
Subjective   Aleena Wilder is a 48 y.o. female.     Chief Complaint   Patient presents with   • Influenza         Influenza   This is a new problem. The current episode started yesterday. The problem occurs constantly. The problem has been unchanged. Associated symptoms include a change in bowel habit, chills, coughing, a fever, headaches, myalgias and nausea. Pertinent negatives include no sore throat. Nothing aggravates the symptoms. She has tried acetaminophen for the symptoms.        The following portions of the patient's history were reviewed and updated as appropriate: allergies, current medications, past social history and problem list.    Outpatient Medications Marked as Taking for the 1/31/19 encounter (Office Visit) with Felix Quan MD   Medication Sig Dispense Refill   • ALLERGY SERUM INJECTION Inject 1 mL under the skin into the appropriate area as directed 1 (One) Time Per Week.     • fluticasone (FLONASE) 50 MCG/ACT nasal spray 2 sprays into each nostril Daily.     • Multiple Vitamins-Minerals (MULTIVITAMIN PO) Take 1 tablet by mouth Daily.     • sulfamethoxazole-trimethoprim (BACTRIM DS) 800-160 MG per tablet Take 1 tablet by mouth.         Review of Systems   Constitutional: Positive for chills and fever.   HENT: Negative for sore throat.    Respiratory: Positive for cough.    Gastrointestinal: Positive for change in bowel habit, diarrhea and nausea.   Musculoskeletal: Positive for myalgias.   Neurological: Positive for headaches.       Objective   Vitals:    01/31/19 1029   BP: 102/60   Pulse: 92   Resp: 16   Temp: 99.8 °F (37.7 °C)   SpO2: 97%          01/31/19  1029   Weight: 56.5 kg (124 lb 9.6 oz)    [unfilled]  Body mass index is 22.78 kg/m².      Physical Exam   Constitutional: She appears well-developed and well-nourished.   HENT:   Head: Normocephalic and atraumatic.   Right Ear: External ear normal.   Left Ear: External ear normal.   Nose: Nose normal.   Mouth/Throat:  Oropharynx is clear and moist.   Eyes: Conjunctivae are normal. Pupils are equal, round, and reactive to light.   Pulmonary/Chest: Effort normal and breath sounds normal. No respiratory distress. She has no wheezes. She has no rales.   Skin: Skin is warm and dry.         Problem List Items Addressed This Visit     None      Visit Diagnoses     Flu-like symptoms    -  Primary        Assessment/Plan   In with 1 day illness.  Cough.  Head and chest congestion.  Slight sore throat.  Diarrhea initially has resolved.  Some nausea.  Daughter is home with influenza just diagnosed 2 weeks ago.  Symptoms are pretty similar.  I suspect she may well have influenza.  No clear indication for treatment today other than fluids, Tylenol, and rest.  Similarly daughter was treated in the same fashion.           .bmifollowup  Dragon disclaimer:   Much of this encounter note is an electronic transcription/translation of spoken language to printed text. The electronic translation of spoken language may permit erroneous, or at times, nonsensical words or phrases to be inadvertently transcribed; Although I have reviewed the note for such errors, some may still exist.

## 2019-04-23 ENCOUNTER — OFFICE VISIT (OUTPATIENT)
Dept: SLEEP MEDICINE | Facility: HOSPITAL | Age: 49
End: 2019-04-23
Attending: PSYCHIATRY & NEUROLOGY

## 2019-04-23 VITALS
BODY MASS INDEX: 23.05 KG/M2 | HEART RATE: 80 BPM | SYSTOLIC BLOOD PRESSURE: 99 MMHG | OXYGEN SATURATION: 99 % | DIASTOLIC BLOOD PRESSURE: 63 MMHG | HEIGHT: 61 IN | WEIGHT: 122.1 LBS

## 2019-04-23 DIAGNOSIS — R40.0 DAYTIME SOMNOLENCE: ICD-10-CM

## 2019-04-23 DIAGNOSIS — G47.33 OSA (OBSTRUCTIVE SLEEP APNEA): Primary | ICD-10-CM

## 2019-04-23 PROCEDURE — G0463 HOSPITAL OUTPT CLINIC VISIT: HCPCS

## 2019-04-23 NOTE — PROGRESS NOTES
"Sleep Medicine Follow-up visit Note    Name: Aleena Wilder  Age: 48 y.o.  Sex: female  :  1970  MRN: [id number]  Date of Service: 2019    Requesting Physician: Dewayne Angela Md  [address] [place] [id number]  .       History of Present Illness:   Aleena Wilder is a 48 y.o. female comes for a follow up visit.     Patients Polysomnography has revealed RENETTA with an AHI of 19.2 per hour of sleep. minimum SPO2  was 84 %. [place] Sleepiness Scale score prior to CPAP therapy was 13/24.    The patient has Moderately obstructive sleep apnea. The patient is on auto CPAP therapy at a mean pressure of 7.9 cms of water. Residual AHI 2.9/ sleep hour. Compliance data to indicate 80% usage for more than 4 hours with an average usage of 5 hours and 59 minutes.  [place] sleepiness scale score with CPAP therapy is 16/24 with CPAP therapy.     Review of Systems - Negative except anxiety.    PMH, Surgical Hx, current Medications, Allergies, Family Hx, Social Hx and problem list were reviewed and updated in the chart.    Objective:  Vitals:    19 1100   BP: 99/63   BP Location: Left arm   Patient Position: Sitting   Pulse: 80   SpO2: 99%   Weight: 55.4 kg (122 lb 1.6 oz)   Height: 154.9 cm (61\")    Body mass index is 23.07 kg/m².       GEN: No significant distress, the patient is well developed, well nourished.  HEENT: pupils equal, round and reactive to light, extraocular movements intact, conjunctiva not injected bilaterally, nasopharyngeal mucosa moist, no lesions appreciated, Mallampati score I (soft palate, uvula, fauces, and tonsillar pillars visible)  NECK: Supple, no jugular venous distention, no thyromegaly, no bruits appreciated  LUNGS: Clear to auscultation bilaterally.    CV: regular rate and rhythm, no gallops, murmurs or rubs  EXT: no cyanosis, clubbing, or edema   NEURO: alert and oriented x 3, motor functions grossly intact, CN II-XII grossly intact.    The patient's " polysomnography study in December 2018 revealed:1. Moderately  severe Obstructive sleep apnea with an Apnea hypopnea index of 19.2 events per sleep hour. The lowest oxygen saturation was 84.0%. The oxygen was below 90% for 2.7% total sleep time. 2. With CPAP / BIPAP treatment, this study revealed improvement in sleep architecture, respiratory events and hypoxia and the best improvement was seen at a pressure of 8 centimeters of water.  At this pressure the Apnea/ hypopnea Index was 0.8 per sleep hour and the Oxygen Saturation remained 93.4% or above.     Assessment and PLAN:   The patient has moderately severe obstructive sleep apnea syndrome and is on CPAP. The patient is compliant and is benefiting from it.  I will continue present CPAP therapy and will see the patient for follow-up in one year.    I have discussed the findings of my evaluation with the patient at length, instructed the patient on basic sleep hygiene, stressing the importance of regular sleep schedule without naps and with 8 hours of sleep per night, avoiding alcohol and sedative medications, limiting caffeine consumption, and implementing a healthy diet and exercise program and not to drive if patient is sleepy.       Dewayne Angela MD  4/23/2019  11:35 AM

## 2020-01-14 ENCOUNTER — OFFICE VISIT (OUTPATIENT)
Dept: INTERNAL MEDICINE | Facility: CLINIC | Age: 50
End: 2020-01-14

## 2020-01-14 VITALS
BODY MASS INDEX: 22.96 KG/M2 | WEIGHT: 121.6 LBS | OXYGEN SATURATION: 99 % | SYSTOLIC BLOOD PRESSURE: 110 MMHG | HEART RATE: 77 BPM | RESPIRATION RATE: 16 BRPM | TEMPERATURE: 98.9 F | DIASTOLIC BLOOD PRESSURE: 70 MMHG | HEIGHT: 61 IN

## 2020-01-14 DIAGNOSIS — E55.9 VITAMIN D DEFICIENCY: ICD-10-CM

## 2020-01-14 DIAGNOSIS — Z00.00 ENCOUNTER FOR PREVENTIVE HEALTH EXAMINATION: Primary | ICD-10-CM

## 2020-01-14 DIAGNOSIS — J45.20 MILD INTERMITTENT ASTHMA WITHOUT COMPLICATION: ICD-10-CM

## 2020-01-14 DIAGNOSIS — G47.33 OSA (OBSTRUCTIVE SLEEP APNEA): ICD-10-CM

## 2020-01-14 PROBLEM — R40.0 DAYTIME SOMNOLENCE: Status: RESOLVED | Noted: 2018-04-24 | Resolved: 2020-01-14

## 2020-01-14 LAB
CLARITY, POC: CLEAR
COLOR UR: YELLOW
PH UR: 6 [PH] (ref 5–8)
PROT UR STRIP-MCNC: NEGATIVE MG/DL
RBC # UR STRIP: ABNORMAL /UL
SP GR UR: 1.01 (ref 1–1.03)

## 2020-01-14 PROCEDURE — 99396 PREV VISIT EST AGE 40-64: CPT | Performed by: INTERNAL MEDICINE

## 2020-01-14 PROCEDURE — 90471 IMMUNIZATION ADMIN: CPT | Performed by: INTERNAL MEDICINE

## 2020-01-14 PROCEDURE — 90674 CCIIV4 VAC NO PRSV 0.5 ML IM: CPT | Performed by: INTERNAL MEDICINE

## 2020-01-14 PROCEDURE — 81003 URINALYSIS AUTO W/O SCOPE: CPT | Performed by: INTERNAL MEDICINE
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
## 2020-01-14 NOTE — PATIENT INSTRUCTIONS
Influenza (Flu) Vaccine (Inactivated or Recombinant): What You Need to Know  1. Why get vaccinated?  Influenza vaccine can prevent influenza (flu).  Flu is a contagious disease that spreads around the [place] every year, usually between October and May. Anyone can get the flu, but it is more dangerous for some people. Infants and young children, people 65 years of age and older, pregnant women, and people with certain health conditions or a weakened immune system are at greatest risk of flu complications.  Pneumonia, bronchitis, sinus infections and ear infections are examples of flu-related complications. If you have a medical condition, such as heart disease, cancer or diabetes, flu can make it worse.  Flu can cause fever and chills, sore throat, muscle aches, fatigue, cough, headache, and runny or stuffy nose. Some people may have vomiting and diarrhea, though this is more common in children than adults.  Each year thousands of people in the [place] die from flu, and many more are hospitalized. Flu vaccine prevents millions of illnesses and flu-related visits to the doctor each year.  2. Influenza vaccine  CDC recommends everyone 6 months of age and older get vaccinated every flu season. Children 6 months through 8 years of age may need 2 doses during a single flu season. Everyone else needs only 1 dose each flu season.  It takes about 2 weeks for protection to develop after vaccination.  There are many flu viruses, and they are always changing. Each year a new flu vaccine is made to protect against three or four viruses that are likely to cause disease in the upcoming flu season. Even when the vaccine doesn't exactly match these viruses, it may still provide some protection.  Influenza vaccine does not cause flu.  Influenza vaccine may be given at the same time as other vaccines.  3. Talk with your health care provider  Tell your vaccine provider if the person getting the vaccine:  · Has had an  allergic reaction after a previous dose of influenza vaccine, or has any severe, life-threatening allergies.  · Has ever had Guillain-Barré Syndrome (also called GBS).  In some cases, your health care provider may decide to postpone influenza vaccination to a future visit.  People with minor illnesses, such as a cold, may be vaccinated. People who are moderately or severely ill should usually wait until they recover before getting influenza vaccine.  Your health care provider can give you more information.  4. Risks of a vaccine reaction  · Soreness, redness, and swelling where shot is given, fever, muscle aches, and headache can happen after influenza vaccine.  · There may be a very small increased risk of Guillain-Barré Syndrome (GBS) after inactivated influenza vaccine (the flu shot).  Young children who get the flu shot along with pneumococcal vaccine (PCV13), and/or DTaP vaccine at the same time might be slightly more likely to have a seizure caused by fever. Tell your health care provider if a child who is getting flu vaccine has ever had a seizure.  People sometimes faint after medical procedures, including vaccination. Tell your provider if you feel dizzy or have vision changes or ringing in the ears.  As with any medicine, there is a very remote chance of a vaccine causing a severe allergic reaction, other serious injury, or death.  5. What if there is a serious problem?  An allergic reaction could occur after the vaccinated person leaves the clinic. If you see signs of a severe allergic reaction (hives, swelling of the face and throat, difficulty breathing, a fast heartbeat, dizziness, or weakness), call 9-1-1 and get the person to the nearest hospital.  For other signs that concern you, call your health care provider.  Adverse reactions should be reported to the Vaccine Adverse Event Reporting System (VAERS). Your health care provider will usually file this report, or you can do it yourself. Visit the  VAERS website at www.vaers.[facility].gov or call 1-502.609.8551.VAERS is only for reporting reactions, and VAERS staff do not give medical advice.  6. The National Vaccine Injury Compensation Program  The National Vaccine Injury Compensation Program (VICP) is a federal program that was created to compensate people who may have been injured by certain vaccines. Visit the VICP website at www.[facility].gov/vaccinecompensation or call 1-550.614.9141 to learn about the program and about filing a claim. There is a time limit to file a claim for compensation.  7. How can I learn more?  · Ask your healthcare provider.  · Call your local or state health department.  · Contact the Centers for Disease Control and Prevention (CDC):  ? Call 1-586.110.6555 (5-147-PRF-INFO) or  ? Visit CDC's www.cdc.gov/flu  Vaccine Information Statement (Interim) Inactivated Influenza Vaccine (8/15/2019)  This information is not intended to replace advice given to you by your health care provider. Make sure you discuss any questions you have with your health care provider.  Document Released: 10/12/2007 Document Revised: 08/19/2019 Document Reviewed: 08/19/2019  Elsevier Interactive Patient Education © 2019 Elsevier Inc.

## 2020-01-15 LAB
25(OH)D3+25(OH)D2 SERPL-MCNC: 26.6 NG/ML (ref 30–100)
ALBUMIN SERPL-MCNC: 4.6 G/DL (ref 3.5–5.2)
ALBUMIN/GLOB SERPL: 1.9 G/DL
ALP SERPL-CCNC: 75 U/L (ref 39–117)
ALT SERPL-CCNC: 13 U/L (ref 1–33)
AST SERPL-CCNC: 17 U/L (ref 1–32)
BASOPHILS # BLD AUTO: 0.08 10*3/MM3 (ref 0–0.2)
BASOPHILS NFR BLD AUTO: 1.5 % (ref 0–1.5)
BILIRUB SERPL-MCNC: 0.5 MG/DL (ref 0.2–1.2)
BUN SERPL-MCNC: 10 MG/DL (ref 6–20)
BUN/CREAT SERPL: 13 (ref 7–25)
CALCIUM SERPL-MCNC: 9.4 MG/DL (ref 8.6–10.5)
CHLORIDE SERPL-SCNC: 102 MMOL/L (ref 98–107)
CHOLEST SERPL-MCNC: 227 MG/DL (ref 0–200)
CO2 SERPL-SCNC: 23.9 MMOL/L (ref 22–29)
CREAT SERPL-MCNC: 0.77 MG/DL (ref 0.57–1)
EOSINOPHIL # BLD AUTO: 0.1 10*3/MM3 (ref 0–0.4)
EOSINOPHIL NFR BLD AUTO: 1.9 % (ref 0.3–6.2)
ERYTHROCYTE [DISTWIDTH] IN BLOOD BY AUTOMATED COUNT: 12.7 % (ref 12.3–15.4)
GLOBULIN SER CALC-MCNC: 2.4 GM/DL
GLUCOSE SERPL-MCNC: 94 MG/DL (ref 65–99)
HCT VFR BLD AUTO: 40.3 % (ref 34–46.6)
HDLC SERPL-MCNC: 76 MG/DL (ref 40–60)
HGB BLD-MCNC: 13.4 G/DL (ref 12–15.9)
IMM GRANULOCYTES # BLD AUTO: 0.01 10*3/MM3 (ref 0–0.05)
IMM GRANULOCYTES NFR BLD AUTO: 0.2 % (ref 0–0.5)
LDLC SERPL CALC-MCNC: 132 MG/DL (ref 0–100)
LYMPHOCYTES # BLD AUTO: 2.07 10*3/MM3 (ref 0.7–3.1)
LYMPHOCYTES NFR BLD AUTO: 39.7 % (ref 19.6–45.3)
MCH RBC QN AUTO: 29.8 PG (ref 26.6–33)
MCHC RBC AUTO-ENTMCNC: 33.3 G/DL (ref 31.5–35.7)
MCV RBC AUTO: 89.8 FL (ref 79–97)
MONOCYTES # BLD AUTO: 0.22 10*3/MM3 (ref 0.1–0.9)
MONOCYTES NFR BLD AUTO: 4.2 % (ref 5–12)
NEUTROPHILS # BLD AUTO: 2.73 10*3/MM3 (ref 1.7–7)
NEUTROPHILS NFR BLD AUTO: 52.5 % (ref 42.7–76)
NRBC BLD AUTO-RTO: 0 /100 WBC (ref 0–0.2)
PLATELET # BLD AUTO: 240 10*3/MM3 (ref 140–450)
POTASSIUM SERPL-SCNC: 4.2 MMOL/L (ref 3.5–5.2)
PROT SERPL-MCNC: 7 G/DL (ref 6–8.5)
RBC # BLD AUTO: 4.49 10*6/MM3 (ref 3.77–5.28)
SODIUM SERPL-SCNC: 140 MMOL/L (ref 136–145)
TRIGL SERPL-MCNC: 96 MG/DL (ref 0–150)
VLDLC SERPL CALC-MCNC: 19.2 MG/DL
WBC # BLD AUTO: 5.21 10*3/MM3 (ref 3.4–10.8)

## 2020-01-31 ENCOUNTER — APPOINTMENT (OUTPATIENT)
Dept: WOMENS IMAGING | Facility: HOSPITAL | Age: 50
End: 2020-01-31

## 2020-01-31 ENCOUNTER — TELEPHONE (OUTPATIENT)
Dept: INTERNAL MEDICINE | Facility: CLINIC | Age: 50
End: 2020-01-31

## 2020-01-31 DIAGNOSIS — Z80.0 FAMILY HISTORY OF COLON CANCER REQUIRING SCREENING COLONOSCOPY: Primary | ICD-10-CM

## 2020-01-31 PROCEDURE — 77067 SCR MAMMO BI INCL CAD: CPT | Performed by: RADIOLOGY

## 2020-01-31 NOTE — TELEPHONE ENCOUNTER
Pt confirmed that she actually never had one. This would be first colonoscopy & her Paternal GF had colon cancer.    Typically she would start with a colonoscopy at age 50.  There has been a move to try to lower this to 45 but most insurance companies are not paying yet under 50.  She ought to check with her insurance carrier first to see if they will cover early screening.  However age 50 is only 6 months away so far were her I would just wait.  She can go ahead and call now and set up an appointment for the colonoscopy in July.

## 2020-01-31 NOTE — TELEPHONE ENCOUNTER
Pt notified. Will call insurance to verify, especially since her GYN also recommend she get one. Either way she will mellissa to schedule & confirm with coverage.

## 2020-01-31 NOTE — TELEPHONE ENCOUNTER
Pt requesting referral for colonoscopy, as she is several years overdue. I did not find where you had recommended this. Please advise.    What is the diagnosis for which she is getting colonoscopies.  This or a family history?  Can make a referral to Dr. Cassia Le.

## 2020-04-13 ENCOUNTER — TELEMEDICINE (OUTPATIENT)
Dept: INTERNAL MEDICINE | Facility: CLINIC | Age: 50
End: 2020-04-13

## 2020-04-13 DIAGNOSIS — L23.7 ALLERGIC CONTACT DERMATITIS DUE TO PLANTS, EXCEPT FOOD: Primary | ICD-10-CM

## 2020-04-13 PROCEDURE — 99213 OFFICE O/P EST LOW 20 MIN: CPT | Performed by: INTERNAL MEDICINE

## 2020-04-13 RX ORDER — PREDNISONE 1 MG/1
TABLET ORAL
Qty: 42 TABLET | Refills: 0 | Status: SHIPPED | OUTPATIENT
Start: 2020-04-13 | End: 2020-07-29

## 2020-04-13 NOTE — PROGRESS NOTES
Subjective   Aleena Wilder is a 49 y.o. female.     Chief Complaint   Patient presents with   • Rash         Rash   This is a new problem. The current episode started in the past 7 days. The problem has been gradually worsening since onset. The affected locations include the back, abdomen, left lower leg, right lower leg, right arm and left arm. The rash is characterized by blistering. Pertinent negatives include no fever. Past treatments include topical steroids. The treatment provided no relief.        The following portions of the patient's history were reviewed and updated as appropriate: allergies, current medications, past social history and problem list.    Outpatient Medications Marked as Taking for the 4/13/20 encounter (Telemedicine) with Felix Quan MD   Medication Sig Dispense Refill   • ALLERGY SERUM INJECTION Inject 1 mL under the skin into the appropriate area as directed 1 (One) Time Per Week.     • Fexofenadine HCl (ALLEGRA ALLERGY PO) Take  by mouth.     • fluticasone (FLONASE) 50 MCG/ACT nasal spray 2 sprays into each nostril Daily.     • Multiple Vitamins-Minerals (MULTIVITAMIN PO) Take 1 tablet by mouth Daily.         Review of Systems   Constitutional: Negative for chills and fever.   Skin: Positive for rash.       Objective   There were no vitals filed for this visit.   Wt Readings from Last 3 Encounters:   01/14/20 55.2 kg (121 lb 9.6 oz)   04/23/19 55.4 kg (122 lb 1.6 oz)   01/31/19 56.5 kg (124 lb 9.6 oz)    There is no height or weight on file to calculate BMI.      Physical Exam   Constitutional: She appears well-developed and well-nourished.   Skin: Rash noted.         Problem List Items Addressed This Visit     None      Visit Diagnoses     Allergic contact dermatitis due to plants, except food    -  Primary    Relevant Medications    predniSONE (DELTASONE) 5 MG tablet        Assessment/Plan   Video visit today due to the COVID-19 pandemic.  Developed a rash about 5 days ago.   Started on the arms.  Migrated to the abdomen and now on the legs.  She was working in the yard 2 weeks ago clearing out some brush.  She has a typical contact dermatitis.  Blistering rash on the arms.  She has been trying a topical steroid without success.  We will add a two-week Pred pack.  Continue with the cream.  Spent about 11 minutes with patient today on the video visit.             Jarad disclaimer:   Much of this encounter note is an electronic transcription/translation of spoken language to printed text. The electronic translation of spoken language may permit erroneous, or at times, nonsensical words or phrases to be inadvertently transcribed; Although I have reviewed the note for such errors, some may still exist.

## 2020-04-21 ENCOUNTER — APPOINTMENT (OUTPATIENT)
Dept: SLEEP MEDICINE | Facility: HOSPITAL | Age: 50
End: 2020-04-21

## 2020-07-14 ENCOUNTER — OFFICE VISIT (OUTPATIENT)
Dept: SLEEP MEDICINE | Facility: HOSPITAL | Age: 50
End: 2020-07-14

## 2020-07-14 VITALS
DIASTOLIC BLOOD PRESSURE: 65 MMHG | SYSTOLIC BLOOD PRESSURE: 103 MMHG | BODY MASS INDEX: 22.82 KG/M2 | HEART RATE: 83 BPM | HEIGHT: 62 IN | WEIGHT: 124 LBS | OXYGEN SATURATION: 98 %

## 2020-07-14 DIAGNOSIS — G47.33 OSA (OBSTRUCTIVE SLEEP APNEA): Primary | ICD-10-CM

## 2020-07-14 NOTE — PROGRESS NOTES
"Sleep Medicine Follow-up visit Note    Name: Aleena Wilder  Age: 49 y.o.  Sex: female  :  1970  MRN: [id number]  Date of Service: 2020    Requesting Physician: Dewayne Angela Md  [address] [place] [id number]  .       History of Present Illness:   Aleena Wilder is a 49 y.o. female comes for a follow up visit.     Patients Polysomnography has revealed RENETTA with an AHI of 19.2 per hour of sleep. minimum SPO2  was 84 %. Gabriels Sleepiness Scale score prior to CPAP therapy was 13/24.    The patient has Moderately obstructive sleep apnea. The patient is on auto CPAP therapy at a mean pressure of 7.9 cms of water. Residual AHI 2.1/ sleep hour. Compliance data to indicate 100% usage for more than 4 hours with an average usage of 6 hours and 52 minutes.  Gabriels sleepiness scale score with CPAP therapy is 10/24 with CPAP therapy.     Review of Systems - Negative except anxiety.    PMH, Surgical Hx, current Medications, Allergies, Family Hx, Social Hx and problem list were reviewed and updated in the chart.    Objective:  Vitals:    20 0900   BP: 103/65   BP Location: Left arm   Patient Position: Sitting   Pulse: 83   SpO2: 98%   Weight: 56.2 kg (124 lb)   Height: 157.5 cm (62\")    Body mass index is 22.68 kg/m².       GEN: No significant distress, the patient is well developed, well nourished.  HEENT: pupils equal, round and reactive to light, extraocular movements intact, conjunctiva not injected bilaterally, nasopharyngeal mucosa moist, no lesions appreciated, Mallampati score I (soft palate, uvula, fauces, and tonsillar pillars visible)  NECK: Supple, no jugular venous distention, no thyromegaly, no bruits appreciated  LUNGS: Clear to auscultation bilaterally.    CV: regular rate and rhythm, no gallops, murmurs or rubs  EXT: no cyanosis, clubbing, or edema   NEURO: alert and oriented x 3, motor functions grossly intact, CN II-XII grossly intact.    The patient's " polysomnography study in December 2018 revealed:1. Moderately  severe Obstructive sleep apnea with an Apnea hypopnea index of 19.2 events per sleep hour. The lowest oxygen saturation was 84.0%. The oxygen was below 90% for 2.7% total sleep time. 2. With CPAP / BIPAP treatment, this study revealed improvement in sleep architecture, respiratory events and hypoxia and the best improvement was seen at a pressure of 8 centimeters of water.  At this pressure the Apnea/ hypopnea Index was 0.8 per sleep hour and the Oxygen Saturation remained 93.4% or above.     Assessment and PLAN:   The patient has moderately severe obstructive sleep apnea syndrome and is on CPAP. The patient is compliant and is benefiting from it.  I will continue present CPAP therapy and will see the patient for follow-up in one year.    I have discussed the findings of my evaluation with the patient at length, instructed the patient on basic sleep hygiene, stressing the importance of regular sleep schedule without naps and with 8 hours of sleep per night, avoiding alcohol and sedative medications, limiting caffeine consumption, and implementing a healthy diet and exercise program and not to drive if patient is sleepy.       Dewayne Angela MD  7/14/2020  10:25

## 2020-07-17 ENCOUNTER — HOSPITAL ENCOUNTER (EMERGENCY)
Facility: HOSPITAL | Age: 50
Discharge: HOME | End: 2020-07-17
Attending: EMERGENCY MEDICINE | Admitting: EMERGENCY MEDICINE

## 2020-07-17 ENCOUNTER — APPOINTMENT (OUTPATIENT)
Dept: CT IMAGING | Facility: HOSPITAL | Age: 50
End: 2020-07-17

## 2020-07-17 VITALS
DIASTOLIC BLOOD PRESSURE: 62 MMHG | HEIGHT: 62 IN | SYSTOLIC BLOOD PRESSURE: 114 MMHG | OXYGEN SATURATION: 97 % | BODY MASS INDEX: 22.68 KG/M2 | HEART RATE: 87 BPM | TEMPERATURE: 97.8 F | RESPIRATION RATE: 16 BRPM

## 2020-07-17 DIAGNOSIS — N20.1 RIGHT URETERAL STONE: Primary | ICD-10-CM

## 2020-07-17 LAB
ALBUMIN SERPL-MCNC: 4.4 G/DL (ref 3.5–5.2)
ALBUMIN/GLOB SERPL: 1.5 G/DL
ALP SERPL-CCNC: 69 U/L (ref 39–117)
ALT SERPL W P-5'-P-CCNC: 13 U/L (ref 1–33)
ANION GAP SERPL CALCULATED.3IONS-SCNC: 12.9 MMOL/L (ref 5–15)
AST SERPL-CCNC: 14 U/L (ref 1–32)
BACTERIA UR QL AUTO: ABNORMAL /HPF
BASOPHILS # BLD AUTO: 0.07 10*3/MM3 (ref 0–0.2)
BASOPHILS NFR BLD AUTO: 0.6 % (ref 0–1.5)
BILIRUB SERPL-MCNC: 0.2 MG/DL (ref 0–1.2)
BILIRUB UR QL STRIP: NEGATIVE
BUN SERPL-MCNC: 15 MG/DL (ref 6–20)
BUN/CREAT SERPL: 20 (ref 7–25)
CALCIUM SPEC-SCNC: 9.5 MG/DL (ref 8.6–10.5)
CHLORIDE SERPL-SCNC: 102 MMOL/L (ref 98–107)
CLARITY UR: ABNORMAL
CO2 SERPL-SCNC: 24.1 MMOL/L (ref 22–29)
COLOR UR: YELLOW
CREAT SERPL-MCNC: 0.75 MG/DL (ref 0.57–1)
DEPRECATED RDW RBC AUTO: 40.2 FL (ref 37–54)
EOSINOPHIL # BLD AUTO: 0.05 10*3/MM3 (ref 0–0.4)
EOSINOPHIL NFR BLD AUTO: 0.4 % (ref 0.3–6.2)
ERYTHROCYTE [DISTWIDTH] IN BLOOD BY AUTOMATED COUNT: 12.6 % (ref 12.3–15.4)
GFR SERPL CREATININE-BSD FRML MDRD: 82 ML/MIN/1.73
GLOBULIN UR ELPH-MCNC: 2.9 GM/DL
GLUCOSE SERPL-MCNC: 163 MG/DL (ref 65–99)
GLUCOSE UR STRIP-MCNC: NEGATIVE MG/DL
HCT VFR BLD AUTO: 39 % (ref 34–46.6)
HGB BLD-MCNC: 13.4 G/DL (ref 12–15.9)
HGB UR QL STRIP.AUTO: ABNORMAL
HOLD SPECIMEN: NORMAL
HOLD SPECIMEN: NORMAL
HYALINE CASTS UR QL AUTO: ABNORMAL /LPF
IMM GRANULOCYTES # BLD AUTO: 0.05 10*3/MM3 (ref 0–0.05)
IMM GRANULOCYTES NFR BLD AUTO: 0.4 % (ref 0–0.5)
KETONES UR QL STRIP: NEGATIVE
LEUKOCYTE ESTERASE UR QL STRIP.AUTO: ABNORMAL
LIPASE SERPL-CCNC: 32 U/L (ref 13–60)
LYMPHOCYTES # BLD AUTO: 1.56 10*3/MM3 (ref 0.7–3.1)
LYMPHOCYTES NFR BLD AUTO: 13.6 % (ref 19.6–45.3)
MCH RBC QN AUTO: 30 PG (ref 26.6–33)
MCHC RBC AUTO-ENTMCNC: 34.4 G/DL (ref 31.5–35.7)
MCV RBC AUTO: 87.4 FL (ref 79–97)
MONOCYTES # BLD AUTO: 0.37 10*3/MM3 (ref 0.1–0.9)
MONOCYTES NFR BLD AUTO: 3.2 % (ref 5–12)
NEUTROPHILS NFR BLD AUTO: 81.8 % (ref 42.7–76)
NEUTROPHILS NFR BLD AUTO: 9.39 10*3/MM3 (ref 1.7–7)
NITRITE UR QL STRIP: NEGATIVE
NRBC BLD AUTO-RTO: 0 /100 WBC (ref 0–0.2)
PH UR STRIP.AUTO: <=5 [PH] (ref 5–8)
PLATELET # BLD AUTO: 232 10*3/MM3 (ref 140–450)
PMV BLD AUTO: 10.5 FL (ref 6–12)
POTASSIUM SERPL-SCNC: 3.4 MMOL/L (ref 3.5–5.2)
PROT SERPL-MCNC: 7.3 G/DL (ref 6–8.5)
PROT UR QL STRIP: ABNORMAL
RBC # BLD AUTO: 4.46 10*6/MM3 (ref 3.77–5.28)
RBC # UR: ABNORMAL /HPF
REF LAB TEST METHOD: ABNORMAL
SODIUM SERPL-SCNC: 139 MMOL/L (ref 136–145)
SP GR UR STRIP: 1.02 (ref 1–1.03)
SQUAMOUS #/AREA URNS HPF: ABNORMAL /HPF
UROBILINOGEN UR QL STRIP: ABNORMAL
WBC # BLD AUTO: 11.49 10*3/MM3 (ref 3.4–10.8)
WBC UR QL AUTO: ABNORMAL /HPF
WHOLE BLOOD HOLD SPECIMEN: NORMAL
WHOLE BLOOD HOLD SPECIMEN: NORMAL

## 2020-07-17 PROCEDURE — 96374 THER/PROPH/DIAG INJ IV PUSH: CPT

## 2020-07-17 PROCEDURE — 99284 EMERGENCY DEPT VISIT MOD MDM: CPT

## 2020-07-17 PROCEDURE — 74176 CT ABD & PELVIS W/O CONTRAST: CPT

## 2020-07-17 PROCEDURE — 80053 COMPREHEN METABOLIC PANEL: CPT | Performed by: EMERGENCY MEDICINE

## 2020-07-17 PROCEDURE — 83690 ASSAY OF LIPASE: CPT | Performed by: EMERGENCY MEDICINE

## 2020-07-17 PROCEDURE — 25010000002 KETOROLAC TROMETHAMINE PER 15 MG: Performed by: PHYSICIAN ASSISTANT

## 2020-07-17 PROCEDURE — 81001 URINALYSIS AUTO W/SCOPE: CPT

## 2020-07-17 PROCEDURE — 85025 COMPLETE CBC W/AUTO DIFF WBC: CPT | Performed by: EMERGENCY MEDICINE

## 2020-07-17 RX ORDER — HYDROCODONE BITARTRATE AND ACETAMINOPHEN 7.5; 325 MG/1; MG/1
1 TABLET ORAL EVERY 6 HOURS PRN
Qty: 15 TABLET | Refills: 0 | Status: SHIPPED | OUTPATIENT
Start: 2020-07-17 | End: 2020-07-29

## 2020-07-17 RX ORDER — ONDANSETRON 4 MG/1
4 TABLET, ORALLY DISINTEGRATING ORAL EVERY 8 HOURS PRN
Qty: 15 TABLET | Refills: 0 | Status: SHIPPED | OUTPATIENT
Start: 2020-07-17 | End: 2021-01-21

## 2020-07-17 RX ORDER — TAMSULOSIN HYDROCHLORIDE 0.4 MG/1
1 CAPSULE ORAL DAILY
Qty: 30 CAPSULE | Refills: 0 | Status: SHIPPED | OUTPATIENT
Start: 2020-07-17 | End: 2021-01-21

## 2020-07-17 RX ORDER — KETOROLAC TROMETHAMINE 15 MG/ML
15 INJECTION, SOLUTION INTRAMUSCULAR; INTRAVENOUS ONCE
Status: COMPLETED | OUTPATIENT
Start: 2020-07-17 | End: 2020-07-17

## 2020-07-17 RX ORDER — SODIUM CHLORIDE 0.9 % (FLUSH) 0.9 %
10 SYRINGE (ML) INJECTION AS NEEDED
Status: DISCONTINUED | OUTPATIENT
Start: 2020-07-17 | End: 2020-07-17 | Stop reason: HOSPADM

## 2020-07-17 RX ADMIN — SODIUM CHLORIDE 1000 ML: 9 INJECTION, SOLUTION INTRAVENOUS at 03:46

## 2020-07-17 RX ADMIN — SODIUM CHLORIDE, PRESERVATIVE FREE 10 ML: 5 INJECTION INTRAVENOUS at 03:44

## 2020-07-17 RX ADMIN — KETOROLAC TROMETHAMINE 15 MG: 15 INJECTION, SOLUTION INTRAMUSCULAR; INTRAVENOUS at 03:49

## 2020-07-17 NOTE — DISCHARGE INSTRUCTIONS
Return to the ER with any further concerns, should your pain not be controlled with the medication, should you develop intractable vomiting, or should you develop a fever.  Call and follow-up with Dr. Paige or one of the physicians at [facility] urology at the number given above for further management.

## 2020-07-17 NOTE — ED NOTES
This ERT wore full PPE including gloves, face mask, and goggles. Hand hygiene performed before and after pt encounter.        Landon Schultz  07/17/20 0328

## 2020-07-17 NOTE — ED TRIAGE NOTES
To ER via PV.  C/o RLQ abd pain with radiation into rt flank and into leg.  + N/V.  States pain started a little higher on the right a couple days ago, but has now moved down and become more severe.      Pt in mask at time of triage.  Triage staff in appropriate PPE.

## 2020-07-17 NOTE — ED PROVIDER NOTES
The DWAYNE and I have discussed this patients history, physical exam, and treatment plan. I have reviewed the documentation and personally had a face to face interaction with the patient. I affirm the documentation and agree with the treatment and plan.  The following note describes my personal findings    This patient is a 49-year-old female presenting to the emergency room today with sudden onset right flank as well as right lower quadrant abdominal pain.  She states the pain has been progressively worsening over the past 2 days.  There has been associated feelings of nausea and vomiting.  She has no history of previous kidney stones.    Exam: The patient is resting comfortably and in no obvious distress, without gross neurological deficits.  The patient is afebrile with stable vital signs.  Examination of her abdomen shows a soft abdomen without tenderness to palpation.  There is no rebound and no guarding present.  There is no CVA tenderness.    Plan: CT scan does show a 5 mm stone in the distal right ureter.  Urinalysis is unremarkable for infection and she states that she feels quite comfortable now.  She will be discharged to home with adequate pain and nausea medication as well as Flomax and instructions to follow-up with urology as indicated.      The patient was wearing a facemask upon entrance into the room and remained in such throughout their visit.  I was wearing PPE including a facemask, eye protection, as well as gloves at any point entering the room and throughout the visit     Slim Fuller MD  07/17/20 0649

## 2020-07-17 NOTE — ED PROVIDER NOTES
EMERGENCY DEPARTMENT ENCOUNTER    Room Number:  06/06  Date of encounter:  7/17/2020  PCP: Felix Quan MD  Historian: Patient      HPI:  Chief Complaint: Right lower abdominal and flank pain  A complete HPI/ROS/PMH/PSH/SH/FH are unobtainable due to: Nothing    Context: Aleena Wilder is a 49 y.o. female who presents to the ED c/o right lower quadrant abdominal and flank pain.  Patient states the pain actually began a couple days ago but has slowly worsened over the course the past 2 days. When at its worse it is described as a 9/10 on the pain scale, sharp, radiating to the right groin, and into the upper right leg.  At present it is described as a 7 out of 10 on the pain scale.  Patient states nothing really makes the pain better or worse.  She denies associated fever and chills, but states there has been nausea and vomiting.  She also informs me that initially the pain seemed to be a little higher in the flank and feels like it is slowly moving lower into the flank.  She is currently not on any antiplatelet or anticoagulant therapy at this time      PAST MEDICAL HISTORY  Active Ambulatory Problems     Diagnosis Date Noted   • Allergic rhinitis 02/09/2016   • Eczema 02/09/2016   • Mild intermittent asthma without complication 01/02/2018   • Chronic midline low back pain without sciatica 01/02/2018   • RENETTA (obstructive sleep apnea) 04/24/2018   • Endometriosis 05/31/2018     Resolved Ambulatory Problems     Diagnosis Date Noted   • Anxiety disorder 02/09/2016   • Allergic dermatitis due to poison ivy 02/09/2016   • Cough 02/09/2016   • Febrile 02/09/2016   • Spasm 02/09/2016   • Infection of skin and subcutaneous tissue 02/09/2016   • Infection of urinary tract 02/09/2016   • Disease caused by virus 02/09/2016   • Urethritis 02/09/2016   • Daytime somnolence 04/24/2018     Past Medical History:   Diagnosis Date   • Kidney stones    • Renal cyst    • Sleep apnea          PAST SURGICAL HISTORY  Past Surgical  History:   Procedure Laterality Date   • APPENDECTOMY N/A 11/2006   • DIAGNOSTIC LAPAROSCOPY N/A 2006   • TOTAL ABDOMINAL HYSTERECTOMY WITH SALPINGO OOPHORECTOMY Bilateral 11/2006         FAMILY HISTORY  Family History   Problem Relation Age of Onset   • Cancer Other         colon   • Diabetes Other    • Hyperlipidemia Other    • Hypertension Other    • Other Other    • Colon cancer Paternal Grandfather         Late 60s or early 70s   • Colon cancer Cousin         Colon cancer, 50s, paternal cousin   • Diabetes Mother    • Heart attack Mother    • Breast cancer Mother    • Melanoma Mother    • Alcohol abuse Father    • COPD Father    • Heart attack Father    • Heart failure Father    • Hyperlipidemia Father    • Melanoma Father    • Colon polyps Father    • No Known Problems Sister    • No Known Problems Brother    • No Known Problems Son    • No Known Problems Son    • No Known Problems Daughter          SOCIAL HISTORY  Social History     Socioeconomic History   • Marital status:      Spouse name: Not on file   • Number of children: Not on file   • Years of education: Not on file   • Highest education level: Not on file   Tobacco Use   • Smoking status: Never Smoker   • Smokeless tobacco: Never Used   Substance and Sexual Activity   • Alcohol use: Yes     Alcohol/week: 2.0 standard drinks     Types: 2 Standard drinks or equivalent per week     Frequency: 2-4 times a month     Drinks per session: 1 or 2     Binge frequency: Never     Comment: social   • Drug use: No   • Sexual activity: Yes     Partners: Male         ALLERGIES  Amoxicillin and Tramadol        REVIEW OF SYSTEMS  Review of Systems     All systems reviewed and negative except for those discussed in HPI.       PHYSICAL EXAM    I have reviewed the triage vital signs and nursing notes.    ED Triage Vitals [07/17/20 0059]   Temp Heart Rate Resp BP SpO2   97.8 °F (36.6 °C) 87 16 146/87 95 %      Temp src Heart Rate Source Patient Position BP Location  FiO2 (%)   Tympanic Monitor -- -- --       Physical Exam  GENERAL: WDWN female, appears uncomfortable  HENT: nares patent, mucous nares moist  EYES: no scleral icterus conjunctiva are not pale in appearance  CV: regular rhythm, regular rate, heart sounds are normal  RESPIRATORY: normal effort  ABDOMEN: Mild TTP right lower quadrant, no guarding no rebound  MUSCULOSKELETAL: no deformity  NEURO: alert, moves all extremities, follows commands face symmetrical, speech normal, no focal neuro deficits  SKIN: warm, dry, no obvious skin rash in the area of concern        LAB RESULTS  Recent Results (from the past 24 hour(s))   Urinalysis With Microscopic If Indicated (No Culture) - Urine, Clean Catch    Collection Time: 07/17/20  1:12 AM   Result Value Ref Range    Color, UA Yellow Yellow, Straw    Appearance, UA Cloudy (A) Clear    pH, UA <=5.0 5.0 - 8.0    Specific Gravity, UA 1.023 1.005 - 1.030    Glucose, UA Negative Negative    Ketones, UA Negative Negative    Bilirubin, UA Negative Negative    Blood, UA Large (3+) (A) Negative    Protein,  mg/dL (2+) (A) Negative    Leuk Esterase, UA Small (1+) (A) Negative    Nitrite, UA Negative Negative    Urobilinogen, UA 0.2 E.U./dL 0.2 - 1.0 E.U./dL   Urinalysis, Microscopic Only - Urine, Clean Catch    Collection Time: 07/17/20  1:12 AM   Result Value Ref Range    RBC, UA Too Numerous to Count (A) None Seen, 0-2 /HPF    WBC, UA 31-50 (A) None Seen, 0-2 /HPF    Bacteria, UA None Seen None Seen /HPF    Squamous Epithelial Cells, UA 0-2 None Seen, 0-2 /HPF    Hyaline Casts, UA 3-6 None Seen /LPF    Methodology Automated Microscopy    Comprehensive Metabolic Panel    Collection Time: 07/17/20  3:45 AM   Result Value Ref Range    Glucose 163 (H) 65 - 99 mg/dL    BUN 15 6 - 20 mg/dL    Creatinine 0.75 0.57 - 1.00 mg/dL    Sodium 139 136 - 145 mmol/L    Potassium 3.4 (L) 3.5 - 5.2 mmol/L    Chloride 102 98 - 107 mmol/L    CO2 24.1 22.0 - 29.0 mmol/L    Calcium 9.5 8.6 - 10.5  mg/dL    Total Protein 7.3 6.0 - 8.5 g/dL    Albumin 4.40 3.50 - 5.20 g/dL    ALT (SGPT) 13 1 - 33 U/L    AST (SGOT) 14 1 - 32 U/L    Alkaline Phosphatase 69 39 - 117 U/L    Total Bilirubin 0.2 0.0 - 1.2 mg/dL    eGFR Non African Amer 82 >60 mL/min/1.73    Globulin 2.9 gm/dL    A/G Ratio 1.5 g/dL    BUN/Creatinine Ratio 20.0 7.0 - 25.0    Anion Gap 12.9 5.0 - 15.0 mmol/L   Lipase    Collection Time: 07/17/20  3:45 AM   Result Value Ref Range    Lipase 32 13 - 60 U/L   Light Blue Top    Collection Time: 07/17/20  3:45 AM   Result Value Ref Range    Extra Tube hold for add-on    Green Top (Gel)    Collection Time: 07/17/20  3:45 AM   Result Value Ref Range    Extra Tube Hold for add-ons.    Lavender Top    Collection Time: 07/17/20  3:45 AM   Result Value Ref Range    Extra Tube hold for add-on    Gold Top - SST    Collection Time: 07/17/20  3:45 AM   Result Value Ref Range    Extra Tube Hold for add-ons.    CBC Auto Differential    Collection Time: 07/17/20  3:45 AM   Result Value Ref Range    WBC 11.49 (H) 3.40 - 10.80 10*3/mm3    RBC 4.46 3.77 - 5.28 10*6/mm3    Hemoglobin 13.4 12.0 - 15.9 g/dL    Hematocrit 39.0 34.0 - 46.6 %    MCV 87.4 79.0 - 97.0 fL    MCH 30.0 26.6 - 33.0 pg    MCHC 34.4 31.5 - 35.7 g/dL    RDW 12.6 12.3 - 15.4 %    RDW-SD 40.2 37.0 - 54.0 fl    MPV 10.5 6.0 - 12.0 fL    Platelets 232 140 - 450 10*3/mm3    Neutrophil % 81.8 (H) 42.7 - 76.0 %    Lymphocyte % 13.6 (L) 19.6 - 45.3 %    Monocyte % 3.2 (L) 5.0 - 12.0 %    Eosinophil % 0.4 0.3 - 6.2 %    Basophil % 0.6 0.0 - 1.5 %    Immature Grans % 0.4 0.0 - 0.5 %    Neutrophils, Absolute 9.39 (H) 1.70 - 7.00 10*3/mm3    Lymphocytes, Absolute 1.56 0.70 - 3.10 10*3/mm3    Monocytes, Absolute 0.37 0.10 - 0.90 10*3/mm3    Eosinophils, Absolute 0.05 0.00 - 0.40 10*3/mm3    Basophils, Absolute 0.07 0.00 - 0.20 10*3/mm3    Immature Grans, Absolute 0.05 0.00 - 0.05 10*3/mm3    nRBC 0.0 0.0 - 0.2 /100 WBC       Ordered the above labs and independently  reviewed the results.        RADIOLOGY  Ct Abdomen Pelvis Without Contrast    Result Date: 7/17/2020  CT OF THE ABDOMEN AND PELVIS WITHOUT CONTRAST  HISTORY: Right flank pain. Hematuria.  COMPARISON: 06/09/2018  TECHNIQUE: Axial CT imaging was obtained through the abdomen and pelvis. No IV contrast was administered.  FINDINGS: The stomach and duodenum are unremarkable. No focal hepatic lesions are seen. Gallbladder, spleen, adrenal glands, and pancreas are within normal limits. There is some minimal stranding within the mesentery, which was also present in June 2018. There is moderate right-sided hydroureteronephrosis. This is secondary to a 5 mm stone which is located within the distal right ureter. There is an additional tiny calcification seen immediately adjacent to it. It is unclear if this is another stone, or a small phlebolith, although the latter is favored. No additional stones are seen within the right kidney. The patient does have several stones within the left kidney. The largest measures up to 8 mm in size. No distal ureteral stone is seen on the left. Urinary bladder is contracted. Uterus is surgically absent. The appendix is not definitively seen, although I don't see any evidence of appendicitis. Perinephric and periureteral stranding is seen on the right. No acute osseous abnormalities are seen. There is a right renal cyst. This is poorly assessed on this exam. It may have some milk of calcium within it.      Moderate right-sided hydroureteronephrosis. This is secondary to a 5 mm stone located within the distal right ureter.  Radiation dose reduction techniques were utilized, including automated exposure control and exposure modulation based on body size.  This report was finalized on 7/17/2020 4:22 AM by Dr. Cassandra Hurst M.D.        I ordered the above noted radiological studies. Reviewed by me and discussed with radiologist.  See dictation for official radiology  interpretation.      PROCEDURES    Procedures      MEDICATIONS GIVEN IN ER    Medications   sodium chloride 0.9 % flush 10 mL (10 mL Intravenous Given 7/17/20 0344)   sodium chloride 0.9 % bolus 1,000 mL (1,000 mL Intravenous New Bag 7/17/20 0346)   ketorolac (TORADOL) injection 15 mg (15 mg Intravenous Given 7/17/20 2839)         PROGRESS, DATA ANALYSIS, CONSULTS, AND MEDICAL DECISION MAKING    All labs have been independently reviewed by me.  All radiology studies have been reviewed by me and discussed with radiologist dictating the report.   EKG's independently viewed and interpreted by me.  Discussion below represents my analysis of pertinent findings related to patient's condition, differential diagnosis, treatment plan and final disposition.    DDX includes but is not limited to: Right ureteral stone, right ovarian cyst appendicitis, hernia, pyelonephritis, musculoskeletal.  CT abdomen pelvis without contrast confirms a 5 mm stone at the right distal UVJ.  Patient's pain is controlled in the emergency department this time with IV Toradol.  Will treat conservatively and have follow-up with [facility] for further evaluation.  We will have the patient return to the emergency department should her pain not be controlled with the medication prescribed, should she develop intractable nausea vomiting, or should she develop a fever.         Prior to seeing patient I performed extensive hand washing, saw to it that the patient was wearing a face mask.  Before entering into the room I wore gloves, glasses, and a face mask.  Prior to leaving the room I doffed my gear and performed handwashing.    AS OF 05:17 VITALS:    BP - 114/62  HR - 87  TEMP - 97.8 °F (36.6 °C) (Tympanic)  O2 SATS - 97%        DIAGNOSIS  Final diagnoses:   Right ureteral stone         DISPOSITION  DISCHARGE    Patient discharged in stable condition.    Reviewed implications of results, diagnosis, meds, responsibility to follow up, warning signs  and symptoms of possible worsening, potential complications and reasons to return to ER.    Patient/Family voiced understanding of above instructions.    Discussed plan for discharge, as there is no emergent indication for admission. Patient referred to primary care provider for BP management due to today's BP. Pt/family is agreeable and understands need for follow up and repeat testing.  Pt is aware that discharge does not mean that nothing is wrong but it indicates no emergency is present that requires admission and they must continue care with follow-up as given below or physician of their choice.     FOLLOW-UP  Patricio Paige MD  [address] [place], [place] [id number]  [phone]    Schedule an appointment as soon as possible for a visit   For further evaluation and treatment         Medication List      New Prescriptions    HYDROcodone-acetaminophen 7.5-325 MG per tablet  Commonly known as:  NORCO  Take 1 tablet by mouth Every 6 (Six) Hours As Needed for Severe Pain .     ondansetron ODT 4 MG disintegrating tablet  Commonly known as:  Zofran ODT  Place 1 tablet on the tongue Every 8 (Eight) Hours As Needed for Nausea or   Vomiting.     tamsulosin 0.4 MG capsule 24 hr capsule  Commonly known as:  FLOMAX  Take 1 capsule by mouth Daily.                   Harlan Larson III, PA  07/17/20 7338

## 2020-07-29 ENCOUNTER — APPOINTMENT (OUTPATIENT)
Dept: PREADMISSION TESTING | Facility: HOSPITAL | Age: 50
End: 2020-07-29

## 2020-07-29 VITALS
SYSTOLIC BLOOD PRESSURE: 125 MMHG | OXYGEN SATURATION: 97 % | HEIGHT: 62 IN | WEIGHT: 123.8 LBS | HEART RATE: 89 BPM | TEMPERATURE: 99 F | DIASTOLIC BLOOD PRESSURE: 77 MMHG | RESPIRATION RATE: 16 BRPM | BODY MASS INDEX: 22.78 KG/M2

## 2020-07-29 PROCEDURE — C9803 HOPD COVID-19 SPEC COLLECT: HCPCS | Performed by: NURSE PRACTITIONER

## 2020-07-29 PROCEDURE — U0004 COV-19 TEST NON-CDC HGH THRU: HCPCS | Performed by: NURSE PRACTITIONER

## 2020-07-29 PROCEDURE — 93005 ELECTROCARDIOGRAM TRACING: CPT

## 2020-07-29 PROCEDURE — 93010 ELECTROCARDIOGRAM REPORT: CPT | Performed by: INTERNAL MEDICINE

## 2020-07-29 RX ORDER — FEXOFENADINE HCL 180 MG/1
180 TABLET ORAL DAILY
COMMUNITY

## 2020-07-30 LAB
REF LAB TEST METHOD: NORMAL
SARS-COV-2 RNA RESP QL NAA+PROBE: NOT DETECTED

## 2020-07-31 ENCOUNTER — APPOINTMENT (OUTPATIENT)
Dept: GENERAL RADIOLOGY | Facility: HOSPITAL | Age: 50
End: 2020-07-31

## 2020-07-31 ENCOUNTER — HOSPITAL ENCOUNTER (OUTPATIENT)
Facility: HOSPITAL | Age: 50
Setting detail: HOSPITAL OUTPATIENT SURGERY
Discharge: HOME | End: 2020-07-31
Attending: UROLOGY | Admitting: UROLOGY

## 2020-07-31 ENCOUNTER — ANESTHESIA (OUTPATIENT)
Dept: PERIOP | Facility: HOSPITAL | Age: 50
End: 2020-07-31

## 2020-07-31 ENCOUNTER — ANESTHESIA EVENT (OUTPATIENT)
Dept: PERIOP | Facility: HOSPITAL | Age: 50
End: 2020-07-31

## 2020-07-31 VITALS
BODY MASS INDEX: 22.46 KG/M2 | OXYGEN SATURATION: 98 % | RESPIRATION RATE: 14 BRPM | TEMPERATURE: 97.6 F | SYSTOLIC BLOOD PRESSURE: 112 MMHG | HEART RATE: 89 BPM | DIASTOLIC BLOOD PRESSURE: 76 MMHG | WEIGHT: 122.8 LBS

## 2020-07-31 DIAGNOSIS — N20.1 RIGHT URETERAL CALCULUS: Primary | ICD-10-CM

## 2020-07-31 PROCEDURE — 0 IOVERSOL 74 % SOLUTION: Performed by: UROLOGY

## 2020-07-31 PROCEDURE — C2617 STENT, NON-COR, TEM W/O DEL: HCPCS | Performed by: UROLOGY

## 2020-07-31 PROCEDURE — 25010000002 PHENYLEPHRINE PER 1 ML: Performed by: NURSE ANESTHETIST, CERTIFIED REGISTERED

## 2020-07-31 PROCEDURE — 25010000002 ONDANSETRON PER 1 MG: Performed by: NURSE ANESTHETIST, CERTIFIED REGISTERED

## 2020-07-31 PROCEDURE — 25010000002 FENTANYL CITRATE (PF) 100 MCG/2ML SOLUTION: Performed by: NURSE ANESTHETIST, CERTIFIED REGISTERED

## 2020-07-31 PROCEDURE — 25010000002 DEXAMETHASONE PER 1 MG: Performed by: NURSE ANESTHETIST, CERTIFIED REGISTERED

## 2020-07-31 PROCEDURE — 25010000003 CEFAZOLIN 1-4 GM/50ML-% SOLUTION: Performed by: UROLOGY

## 2020-07-31 PROCEDURE — 25010000002 PROPOFOL 10 MG/ML EMULSION: Performed by: NURSE ANESTHETIST, CERTIFIED REGISTERED

## 2020-07-31 PROCEDURE — C1758 CATHETER, URETERAL: HCPCS | Performed by: UROLOGY

## 2020-07-31 PROCEDURE — C1769 GUIDE WIRE: HCPCS | Performed by: UROLOGY

## 2020-07-31 PROCEDURE — 74018 RADEX ABDOMEN 1 VIEW: CPT

## 2020-07-31 DEVICE — URETERAL STENT
Type: IMPLANTABLE DEVICE | Site: URETER | Status: FUNCTIONAL
Brand: PERCUFLEX™ PLUS

## 2020-07-31 RX ORDER — EPHEDRINE SULFATE 50 MG/ML
5 INJECTION, SOLUTION INTRAVENOUS ONCE AS NEEDED
Status: DISCONTINUED | OUTPATIENT
Start: 2020-07-31 | End: 2020-07-31 | Stop reason: HOSPADM

## 2020-07-31 RX ORDER — PROMETHAZINE HYDROCHLORIDE 25 MG/ML
12.5 INJECTION, SOLUTION INTRAMUSCULAR; INTRAVENOUS ONCE AS NEEDED
Status: DISCONTINUED | OUTPATIENT
Start: 2020-07-31 | End: 2020-07-31 | Stop reason: HOSPADM

## 2020-07-31 RX ORDER — HYDROCODONE BITARTRATE AND ACETAMINOPHEN 7.5; 325 MG/1; MG/1
1 TABLET ORAL EVERY 6 HOURS PRN
Qty: 20 TABLET | Refills: 0 | Status: SHIPPED | OUTPATIENT
Start: 2020-07-31 | End: 2021-01-21

## 2020-07-31 RX ORDER — LABETALOL HYDROCHLORIDE 5 MG/ML
5 INJECTION, SOLUTION INTRAVENOUS
Status: DISCONTINUED | OUTPATIENT
Start: 2020-07-31 | End: 2020-07-31 | Stop reason: HOSPADM

## 2020-07-31 RX ORDER — FENTANYL CITRATE 50 UG/ML
50 INJECTION, SOLUTION INTRAMUSCULAR; INTRAVENOUS
Status: DISCONTINUED | OUTPATIENT
Start: 2020-07-31 | End: 2020-07-31 | Stop reason: HOSPADM

## 2020-07-31 RX ORDER — ACETAMINOPHEN 650 MG/1
650 SUPPOSITORY RECTAL ONCE AS NEEDED
Status: DISCONTINUED | OUTPATIENT
Start: 2020-07-31 | End: 2020-07-31 | Stop reason: HOSPADM

## 2020-07-31 RX ORDER — FENTANYL CITRATE 50 UG/ML
INJECTION, SOLUTION INTRAMUSCULAR; INTRAVENOUS AS NEEDED
Status: DISCONTINUED | OUTPATIENT
Start: 2020-07-31 | End: 2020-07-31 | Stop reason: SURG

## 2020-07-31 RX ORDER — FAMOTIDINE 10 MG/ML
20 INJECTION, SOLUTION INTRAVENOUS ONCE
Status: COMPLETED | OUTPATIENT
Start: 2020-07-31 | End: 2020-07-31

## 2020-07-31 RX ORDER — PROMETHAZINE HYDROCHLORIDE 25 MG/1
25 TABLET ORAL ONCE AS NEEDED
Status: DISCONTINUED | OUTPATIENT
Start: 2020-07-31 | End: 2020-07-31 | Stop reason: HOSPADM

## 2020-07-31 RX ORDER — SODIUM CHLORIDE 0.9 % (FLUSH) 0.9 %
3 SYRINGE (ML) INJECTION EVERY 12 HOURS SCHEDULED
Status: DISCONTINUED | OUTPATIENT
Start: 2020-07-31 | End: 2020-07-31 | Stop reason: HOSPADM

## 2020-07-31 RX ORDER — SULFAMETHOXAZOLE AND TRIMETHOPRIM 800; 160 MG/1; MG/1
1 TABLET ORAL 2 TIMES DAILY
Qty: 6 TABLET | Refills: 0 | Status: SHIPPED | OUTPATIENT
Start: 2020-07-31 | End: 2021-01-21

## 2020-07-31 RX ORDER — NALOXONE HCL 0.4 MG/ML
0.2 VIAL (ML) INJECTION AS NEEDED
Status: DISCONTINUED | OUTPATIENT
Start: 2020-07-31 | End: 2020-07-31 | Stop reason: HOSPADM

## 2020-07-31 RX ORDER — SODIUM CHLORIDE 9 MG/ML
INJECTION, SOLUTION INTRAVENOUS AS NEEDED
Status: DISCONTINUED | OUTPATIENT
Start: 2020-07-31 | End: 2020-07-31 | Stop reason: HOSPADM

## 2020-07-31 RX ORDER — EPHEDRINE SULFATE 50 MG/ML
INJECTION, SOLUTION INTRAVENOUS AS NEEDED
Status: DISCONTINUED | OUTPATIENT
Start: 2020-07-31 | End: 2020-07-31 | Stop reason: SURG

## 2020-07-31 RX ORDER — ACETAMINOPHEN 325 MG/1
650 TABLET ORAL ONCE AS NEEDED
Status: DISCONTINUED | OUTPATIENT
Start: 2020-07-31 | End: 2020-07-31 | Stop reason: HOSPADM

## 2020-07-31 RX ORDER — MIDAZOLAM HYDROCHLORIDE 1 MG/ML
1 INJECTION INTRAMUSCULAR; INTRAVENOUS
Status: DISCONTINUED | OUTPATIENT
Start: 2020-07-31 | End: 2020-07-31 | Stop reason: HOSPADM

## 2020-07-31 RX ORDER — DIPHENHYDRAMINE HCL 25 MG
25 CAPSULE ORAL
Status: DISCONTINUED | OUTPATIENT
Start: 2020-07-31 | End: 2020-07-31 | Stop reason: HOSPADM

## 2020-07-31 RX ORDER — ONDANSETRON 2 MG/ML
INJECTION INTRAMUSCULAR; INTRAVENOUS AS NEEDED
Status: DISCONTINUED | OUTPATIENT
Start: 2020-07-31 | End: 2020-07-31 | Stop reason: SURG

## 2020-07-31 RX ORDER — HYDROCODONE BITARTRATE AND ACETAMINOPHEN 7.5; 325 MG/1; MG/1
1 TABLET ORAL ONCE AS NEEDED
Status: DISCONTINUED | OUTPATIENT
Start: 2020-07-31 | End: 2020-07-31 | Stop reason: HOSPADM

## 2020-07-31 RX ORDER — SODIUM CHLORIDE, SODIUM LACTATE, POTASSIUM CHLORIDE, CALCIUM CHLORIDE 600; 310; 30; 20 MG/100ML; MG/100ML; MG/100ML; MG/100ML
9 INJECTION, SOLUTION INTRAVENOUS CONTINUOUS
Status: DISCONTINUED | OUTPATIENT
Start: 2020-07-31 | End: 2020-07-31 | Stop reason: HOSPADM

## 2020-07-31 RX ORDER — LIDOCAINE HYDROCHLORIDE 10 MG/ML
0.5 INJECTION, SOLUTION EPIDURAL; INFILTRATION; INTRACAUDAL; PERINEURAL ONCE AS NEEDED
Status: DISCONTINUED | OUTPATIENT
Start: 2020-07-31 | End: 2020-07-31 | Stop reason: HOSPADM

## 2020-07-31 RX ORDER — DEXAMETHASONE SODIUM PHOSPHATE 10 MG/ML
INJECTION INTRAMUSCULAR; INTRAVENOUS AS NEEDED
Status: DISCONTINUED | OUTPATIENT
Start: 2020-07-31 | End: 2020-07-31 | Stop reason: SURG

## 2020-07-31 RX ORDER — DIPHENHYDRAMINE HYDROCHLORIDE 50 MG/ML
12.5 INJECTION INTRAMUSCULAR; INTRAVENOUS
Status: DISCONTINUED | OUTPATIENT
Start: 2020-07-31 | End: 2020-07-31 | Stop reason: HOSPADM

## 2020-07-31 RX ORDER — PROPOFOL 10 MG/ML
VIAL (ML) INTRAVENOUS AS NEEDED
Status: DISCONTINUED | OUTPATIENT
Start: 2020-07-31 | End: 2020-07-31 | Stop reason: SURG

## 2020-07-31 RX ORDER — FLUMAZENIL 0.1 MG/ML
0.2 INJECTION INTRAVENOUS AS NEEDED
Status: DISCONTINUED | OUTPATIENT
Start: 2020-07-31 | End: 2020-07-31 | Stop reason: HOSPADM

## 2020-07-31 RX ORDER — HYDROMORPHONE HYDROCHLORIDE 1 MG/ML
0.5 INJECTION, SOLUTION INTRAMUSCULAR; INTRAVENOUS; SUBCUTANEOUS
Status: DISCONTINUED | OUTPATIENT
Start: 2020-07-31 | End: 2020-07-31 | Stop reason: HOSPADM

## 2020-07-31 RX ORDER — CEFAZOLIN SODIUM 1 G/50ML
1 INJECTION, SOLUTION INTRAVENOUS ONCE
Status: COMPLETED | OUTPATIENT
Start: 2020-07-31 | End: 2020-07-31

## 2020-07-31 RX ORDER — HYDRALAZINE HYDROCHLORIDE 20 MG/ML
5 INJECTION INTRAMUSCULAR; INTRAVENOUS
Status: DISCONTINUED | OUTPATIENT
Start: 2020-07-31 | End: 2020-07-31 | Stop reason: HOSPADM

## 2020-07-31 RX ORDER — OXYCODONE AND ACETAMINOPHEN 7.5; 325 MG/1; MG/1
1 TABLET ORAL ONCE AS NEEDED
Status: DISCONTINUED | OUTPATIENT
Start: 2020-07-31 | End: 2020-07-31 | Stop reason: HOSPADM

## 2020-07-31 RX ORDER — PROMETHAZINE HYDROCHLORIDE 25 MG/1
25 SUPPOSITORY RECTAL ONCE AS NEEDED
Status: DISCONTINUED | OUTPATIENT
Start: 2020-07-31 | End: 2020-07-31 | Stop reason: HOSPADM

## 2020-07-31 RX ORDER — SODIUM CHLORIDE 0.9 % (FLUSH) 0.9 %
3-10 SYRINGE (ML) INJECTION AS NEEDED
Status: DISCONTINUED | OUTPATIENT
Start: 2020-07-31 | End: 2020-07-31 | Stop reason: HOSPADM

## 2020-07-31 RX ORDER — ONDANSETRON 2 MG/ML
4 INJECTION INTRAMUSCULAR; INTRAVENOUS ONCE AS NEEDED
Status: DISCONTINUED | OUTPATIENT
Start: 2020-07-31 | End: 2020-07-31 | Stop reason: HOSPADM

## 2020-07-31 RX ORDER — LIDOCAINE HYDROCHLORIDE 20 MG/ML
INJECTION, SOLUTION INFILTRATION; PERINEURAL AS NEEDED
Status: DISCONTINUED | OUTPATIENT
Start: 2020-07-31 | End: 2020-07-31 | Stop reason: SURG

## 2020-07-31 RX ORDER — PROMETHAZINE HYDROCHLORIDE 25 MG/ML
6.25 INJECTION, SOLUTION INTRAMUSCULAR; INTRAVENOUS
Status: DISCONTINUED | OUTPATIENT
Start: 2020-07-31 | End: 2020-07-31 | Stop reason: HOSPADM

## 2020-07-31 RX ADMIN — EPHEDRINE SULFATE 10 MG: 50 INJECTION INTRAVENOUS at 12:32

## 2020-07-31 RX ADMIN — PROPOFOL 50 MG: 10 INJECTION, EMULSION INTRAVENOUS at 12:13

## 2020-07-31 RX ADMIN — PHENYLEPHRINE HYDROCHLORIDE 150 MCG: 10 INJECTION INTRAVENOUS at 12:31

## 2020-07-31 RX ADMIN — FAMOTIDINE 20 MG: 10 INJECTION INTRAVENOUS at 11:46

## 2020-07-31 RX ADMIN — DEXAMETHASONE SODIUM PHOSPHATE 10 MG: 10 INJECTION INTRAMUSCULAR; INTRAVENOUS at 12:15

## 2020-07-31 RX ADMIN — PHENYLEPHRINE HYDROCHLORIDE 100 MCG: 10 INJECTION INTRAVENOUS at 12:28

## 2020-07-31 RX ADMIN — ONDANSETRON HYDROCHLORIDE 4 MG: 2 SOLUTION INTRAMUSCULAR; INTRAVENOUS at 12:30

## 2020-07-31 RX ADMIN — SODIUM CHLORIDE, POTASSIUM CHLORIDE, SODIUM LACTATE AND CALCIUM CHLORIDE: 600; 310; 30; 20 INJECTION, SOLUTION INTRAVENOUS at 12:10

## 2020-07-31 RX ADMIN — CEFAZOLIN SODIUM 1 G: 1 INJECTION, SOLUTION INTRAVENOUS at 12:14

## 2020-07-31 RX ADMIN — LIDOCAINE HYDROCHLORIDE 100 MG: 20 INJECTION, SOLUTION INFILTRATION; PERINEURAL at 12:12

## 2020-07-31 RX ADMIN — FENTANYL CITRATE 25 MCG: 50 INJECTION INTRAMUSCULAR; INTRAVENOUS at 12:18

## 2020-07-31 RX ADMIN — PROPOFOL 200 MG: 10 INJECTION, EMULSION INTRAVENOUS at 12:12

## 2020-07-31 RX ADMIN — PHENYLEPHRINE HYDROCHLORIDE 100 MCG: 10 INJECTION INTRAVENOUS at 12:25

## 2020-07-31 RX ADMIN — SODIUM CHLORIDE, POTASSIUM CHLORIDE, SODIUM LACTATE AND CALCIUM CHLORIDE 9 ML/HR: 600; 310; 30; 20 INJECTION, SOLUTION INTRAVENOUS at 11:46

## 2020-07-31 NOTE — ANESTHESIA PREPROCEDURE EVALUATION
Anesthesia Evaluation     Patient summary reviewed and Nursing notes reviewed   no history of anesthetic complications:  NPO Solid Status: > 8 hours  NPO Liquid Status: > 2 hours           Airway   Mallampati: II  TM distance: >3 FB  Neck ROM: full  Possible difficult intubation  Dental      Pulmonary - normal exam   (+) asthma (mild intermittent, really only with allergies),sleep apnea,   Cardiovascular - normal exam    ECG reviewed        Neuro/Psych  (+) psychiatric history (panic attacks) Anxiety,     GI/Hepatic/Renal/Endo    (+)   renal disease stones,     Musculoskeletal     (+) back pain,   Abdominal  - normal exam   Substance History      OB/GYN          Other                        Anesthesia Plan    ASA 2     general   (I have reviewed the patient's history with the patient and the chart, including all pertinent laboratory results and imaging. I have explained the risks of anesthesia including but not limited to dental damage, corneal abrasion, nerve injury, MI, stroke, and death.  )  intravenous induction     Anesthetic plan, all risks, benefits, and alternatives have been provided, discussed and informed consent has been obtained with: patient.    Plan discussed with CRNA.

## 2020-07-31 NOTE — ANESTHESIA POSTPROCEDURE EVALUATION
Patient: Aleena Wilder    Procedure Summary     Date:  07/31/20 Room / Location:   [facility]  [facility]    Anesthesia Start:  1209 Anesthesia Stop:  1248    Procedure:  CYSTO, STENT PLACEMENT, RIGHT RETROGRADE PYELOGRAM (Right ) Diagnosis:      Surgeon:  Nii Ca MD Provider:  Render, Nima Hendrickson MD    Anesthesia Type:  general ASA Status:  2          Anesthesia Type: general    Vitals  Vitals Value Taken Time   /79 7/31/2020  1:15 PM   Temp 36.4 °C (97.6 °F) 7/31/2020  1:15 PM   Pulse 78 7/31/2020  1:16 PM   Resp 16 7/31/2020  1:15 PM   SpO2 100 % 7/31/2020  1:24 PM   Vitals shown include unvalidated device data.        Post Anesthesia Care and Evaluation    Patient location during evaluation: bedside  Patient participation: complete - patient participated  Level of consciousness: awake and alert  Pain management: adequate  Airway patency: patent  Anesthetic complications: No anesthetic complications  PONV Status: controlled  Cardiovascular status: acceptable  Respiratory status: acceptable  Hydration status: acceptable

## 2020-07-31 NOTE — ANESTHESIA PROCEDURE NOTES
Airway  Urgency: elective    Date/Time: 7/31/2020 12:13 PM    General Information and Staff    Patient location during procedure: OR  Anesthesiologist: Nima Kearns MD  CRNA: Corrie Angelo CRNA    Indications and Patient Condition  Indications for airway management: airway protection    Preoxygenated: yes  Mask difficulty assessment: 1 - vent by mask    Final Airway Details  Final airway type: supraglottic airway      Successful airway: unique  Size 4    Number of attempts at approach: 1  Assessment: lips, teeth, and gum same as pre-op and atraumatic intubation

## 2020-11-12 ENCOUNTER — OFFICE VISIT (OUTPATIENT)
Dept: INTERNAL MEDICINE | Facility: CLINIC | Age: 50
End: 2020-11-12

## 2020-11-12 DIAGNOSIS — Z23 NEED FOR INFLUENZA VACCINATION: Primary | ICD-10-CM

## 2020-11-12 PROCEDURE — 90686 IIV4 VACC NO PRSV 0.5 ML IM: CPT | Performed by: INTERNAL MEDICINE

## 2020-11-12 PROCEDURE — 90471 IMMUNIZATION ADMIN: CPT | Performed by: INTERNAL MEDICINE

## 2020-11-12 NOTE — PATIENT INSTRUCTIONS
Influenza (Flu) Vaccine (Inactivated or Recombinant): What You Need to Know  1. Why get vaccinated?  Influenza vaccine can prevent influenza (flu).  Flu is a contagious disease that spreads around the [place] every year, usually between October and May. Anyone can get the flu, but it is more dangerous for some people. Infants and young children, people 65 years of age and older, pregnant women, and people with certain health conditions or a weakened immune system are at greatest risk of flu complications.  Pneumonia, bronchitis, sinus infections and ear infections are examples of flu-related complications. If you have a medical condition, such as heart disease, cancer or diabetes, flu can make it worse.  Flu can cause fever and chills, sore throat, muscle aches, fatigue, cough, headache, and runny or stuffy nose. Some people may have vomiting and diarrhea, though this is more common in children than adults.  Each year thousands of people in the [place] die from flu, and many more are hospitalized. Flu vaccine prevents millions of illnesses and flu-related visits to the doctor each year.  2. Influenza vaccine  CDC recommends everyone 6 months of age and older get vaccinated every flu season. Children 6 months through 8 years of age may need 2 doses during a single flu season. Everyone else needs only 1 dose each flu season.  It takes about 2 weeks for protection to develop after vaccination.  There are many flu viruses, and they are always changing. Each year a new flu vaccine is made to protect against three or four viruses that are likely to cause disease in the upcoming flu season. Even when the vaccine doesn't exactly match these viruses, it may still provide some protection.  Influenza vaccine does not cause flu.  Influenza vaccine may be given at the same time as other vaccines.  3. Talk with your health care provider  Tell your vaccine provider if the person getting the vaccine:  · Has had an  allergic reaction after a previous dose of influenza vaccine, or has any severe, life-threatening allergies.  · Has ever had Guillain-Barré Syndrome (also called GBS).  In some cases, your health care provider may decide to postpone influenza vaccination to a future visit.  People with minor illnesses, such as a cold, may be vaccinated. People who are moderately or severely ill should usually wait until they recover before getting influenza vaccine.  Your health care provider can give you more information.  4. Risks of a vaccine reaction  · Soreness, redness, and swelling where shot is given, fever, muscle aches, and headache can happen after influenza vaccine.  · There may be a very small increased risk of Guillain-Barré Syndrome (GBS) after inactivated influenza vaccine (the flu shot).  Young children who get the flu shot along with pneumococcal vaccine (PCV13), and/or DTaP vaccine at the same time might be slightly more likely to have a seizure caused by fever. Tell your health care provider if a child who is getting flu vaccine has ever had a seizure.  People sometimes faint after medical procedures, including vaccination. Tell your provider if you feel dizzy or have vision changes or ringing in the ears.  As with any medicine, there is a very remote chance of a vaccine causing a severe allergic reaction, other serious injury, or death.  5. What if there is a serious problem?  An allergic reaction could occur after the vaccinated person leaves the clinic. If you see signs of a severe allergic reaction (hives, swelling of the face and throat, difficulty breathing, a fast heartbeat, dizziness, or weakness), call 9-1-1 and get the person to the nearest hospital.  For other signs that concern you, call your health care provider.  Adverse reactions should be reported to the Vaccine Adverse Event Reporting System (VAERS). Your health care provider will usually file this report, or you can do it yourself. Visit the  VAERS website at www.vaers.[facility].gov or call 1-855.843.5355.VAERS is only for reporting reactions, and VAERS staff do not give medical advice.  6. The National Vaccine Injury Compensation Program  The National Vaccine Injury Compensation Program (VICP) is a federal program that was created to compensate people who may have been injured by certain vaccines. Visit the VICP website at www.[facility].gov/vaccinecompensation or call 1-182.966.5601 to learn about the program and about filing a claim. There is a time limit to file a claim for compensation.  7. How can I learn more?  · Ask your healthcare provider.  · Call your local or state health department.  · Contact the Centers for Disease Control and Prevention (CDC):  ? Call 1-809.199.8156 (8-748-YHR-INFO) or  ? Visit CDC's www.cdc.gov/flu  Vaccine Information Statement (Interim) Inactivated Influenza Vaccine (8/15/2019)  This information is not intended to replace advice given to you by your health care provider. Make sure you discuss any questions you have with your health care provider.  Document Released: 10/12/2007 Document Revised: 04/07/2020 Document Reviewed: 08/19/2019  Elsevier Patient Education © 2020 [employer]

## 2020-12-03 ENCOUNTER — TELEPHONE (OUTPATIENT)
Dept: GASTROENTEROLOGY | Facility: CLINIC | Age: 50
End: 2020-12-03

## 2021-01-21 ENCOUNTER — TELEPHONE (OUTPATIENT)
Dept: GASTROENTEROLOGY | Facility: CLINIC | Age: 51
End: 2021-01-21

## 2021-01-21 ENCOUNTER — OFFICE VISIT (OUTPATIENT)
Dept: INTERNAL MEDICINE | Facility: CLINIC | Age: 51
End: 2021-01-21

## 2021-01-21 VITALS
OXYGEN SATURATION: 96 % | SYSTOLIC BLOOD PRESSURE: 114 MMHG | BODY MASS INDEX: 23.37 KG/M2 | HEIGHT: 62 IN | WEIGHT: 127 LBS | HEART RATE: 82 BPM | TEMPERATURE: 96.6 F | DIASTOLIC BLOOD PRESSURE: 72 MMHG | RESPIRATION RATE: 16 BRPM

## 2021-01-21 DIAGNOSIS — Z00.00 ENCOUNTER FOR PREVENTIVE HEALTH EXAMINATION: Primary | ICD-10-CM

## 2021-01-21 DIAGNOSIS — Z12.11 SCREEN FOR COLON CANCER: ICD-10-CM

## 2021-01-21 DIAGNOSIS — E55.9 VITAMIN D DEFICIENCY: ICD-10-CM

## 2021-01-21 DIAGNOSIS — Z11.59 NEED FOR HEPATITIS C SCREENING TEST: ICD-10-CM

## 2021-01-21 LAB
CLARITY, POC: CLEAR
COLOR UR: YELLOW
PH UR: 7.5 [PH] (ref 5–8)
PROT UR STRIP-MCNC: NEGATIVE MG/DL
RBC # UR STRIP: NEGATIVE /UL
SP GR UR: 1.01 (ref 1–1.03)

## 2021-01-21 PROCEDURE — 99396 PREV VISIT EST AGE 40-64: CPT | Performed by: INTERNAL MEDICINE

## 2021-01-21 PROCEDURE — 81003 URINALYSIS AUTO W/O SCOPE: CPT | Performed by: INTERNAL MEDICINE
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
## 2021-01-22 PROBLEM — R73.02 IGT (IMPAIRED GLUCOSE TOLERANCE): Status: ACTIVE | Noted: 2021-01-22

## 2021-01-22 LAB
25(OH)D3+25(OH)D2 SERPL-MCNC: 30.7 NG/ML (ref 30–100)
ALBUMIN SERPL-MCNC: 4.6 G/DL (ref 3.5–5.2)
ALBUMIN/GLOB SERPL: 1.8 G/DL
ALP SERPL-CCNC: 86 U/L (ref 39–117)
ALT SERPL-CCNC: 15 U/L (ref 1–33)
AST SERPL-CCNC: 19 U/L (ref 1–32)
BASOPHILS # BLD AUTO: 0.08 10*3/MM3 (ref 0–0.2)
BASOPHILS NFR BLD AUTO: 1.5 % (ref 0–1.5)
BILIRUB SERPL-MCNC: 0.5 MG/DL (ref 0–1.2)
BUN SERPL-MCNC: 14 MG/DL (ref 6–20)
BUN/CREAT SERPL: 18.9 (ref 7–25)
CALCIUM SERPL-MCNC: 9.7 MG/DL (ref 8.6–10.5)
CHLORIDE SERPL-SCNC: 104 MMOL/L (ref 98–107)
CHOLEST SERPL-MCNC: 247 MG/DL (ref 0–200)
CHOLEST/HDLC SERPL: 3.86 {RATIO}
CO2 SERPL-SCNC: 28.2 MMOL/L (ref 22–29)
CREAT SERPL-MCNC: 0.74 MG/DL (ref 0.57–1)
EOSINOPHIL # BLD AUTO: 0.11 10*3/MM3 (ref 0–0.4)
EOSINOPHIL NFR BLD AUTO: 2.1 % (ref 0.3–6.2)
ERYTHROCYTE [DISTWIDTH] IN BLOOD BY AUTOMATED COUNT: 12.6 % (ref 12.3–15.4)
GLOBULIN SER CALC-MCNC: 2.5 GM/DL
GLUCOSE SERPL-MCNC: 105 MG/DL (ref 65–99)
HBA1C MFR BLD: 5.92 % (ref 4.8–5.6)
HCT VFR BLD AUTO: 39.5 % (ref 34–46.6)
HCV AB S/CO SERPL IA: <0.1 S/CO RATIO (ref 0–0.9)
HDLC SERPL-MCNC: 64 MG/DL (ref 40–60)
HGB BLD-MCNC: 13.4 G/DL (ref 12–15.9)
IMM GRANULOCYTES # BLD AUTO: 0.02 10*3/MM3 (ref 0–0.05)
IMM GRANULOCYTES NFR BLD AUTO: 0.4 % (ref 0–0.5)
LDLC SERPL CALC-MCNC: 158 MG/DL (ref 0–100)
LYMPHOCYTES # BLD AUTO: 2.06 10*3/MM3 (ref 0.7–3.1)
LYMPHOCYTES NFR BLD AUTO: 38.9 % (ref 19.6–45.3)
Lab: NORMAL
MCH RBC QN AUTO: 30 PG (ref 26.6–33)
MCHC RBC AUTO-ENTMCNC: 33.9 G/DL (ref 31.5–35.7)
MCV RBC AUTO: 88.4 FL (ref 79–97)
MONOCYTES # BLD AUTO: 0.33 10*3/MM3 (ref 0.1–0.9)
MONOCYTES NFR BLD AUTO: 6.2 % (ref 5–12)
NEUTROPHILS # BLD AUTO: 2.7 10*3/MM3 (ref 1.7–7)
NEUTROPHILS NFR BLD AUTO: 50.9 % (ref 42.7–76)
NRBC BLD AUTO-RTO: 0 /100 WBC (ref 0–0.2)
PLATELET # BLD AUTO: 275 10*3/MM3 (ref 140–450)
POTASSIUM SERPL-SCNC: 4.1 MMOL/L (ref 3.5–5.2)
PROT SERPL-MCNC: 7.1 G/DL (ref 6–8.5)
RBC # BLD AUTO: 4.47 10*6/MM3 (ref 3.77–5.28)
SODIUM SERPL-SCNC: 137 MMOL/L (ref 136–145)
TRIGL SERPL-MCNC: 141 MG/DL (ref 0–150)
VLDLC SERPL CALC-MCNC: 25 MG/DL (ref 5–40)
WBC # BLD AUTO: 5.3 10*3/MM3 (ref 3.4–10.8)
WRITTEN AUTHORIZATION: NORMAL

## 2021-01-22 RX ORDER — CHOLECALCIFEROL (VITAMIN D3) 125 MCG
2000 CAPSULE ORAL DAILY
COMMUNITY

## 2021-02-23 ENCOUNTER — APPOINTMENT (OUTPATIENT)
Dept: WOMENS IMAGING | Facility: HOSPITAL | Age: 51
End: 2021-02-23

## 2021-02-23 PROCEDURE — 77067 SCR MAMMO BI INCL CAD: CPT | Performed by: RADIOLOGY

## 2021-02-23 PROCEDURE — 77063 BREAST TOMOSYNTHESIS BI: CPT | Performed by: RADIOLOGY

## 2021-03-24 ENCOUNTER — BULK ORDERING (OUTPATIENT)
Dept: CASE MANAGEMENT | Facility: OTHER | Age: 51
End: 2021-03-24

## 2021-03-24 DIAGNOSIS — Z23 IMMUNIZATION DUE: ICD-10-CM

## 2021-07-13 ENCOUNTER — OFFICE VISIT (OUTPATIENT)
Dept: SLEEP MEDICINE | Facility: HOSPITAL | Age: 51
End: 2021-07-13

## 2021-07-13 VITALS
HEIGHT: 62 IN | HEART RATE: 69 BPM | SYSTOLIC BLOOD PRESSURE: 96 MMHG | OXYGEN SATURATION: 98 % | WEIGHT: 119 LBS | BODY MASS INDEX: 21.9 KG/M2 | DIASTOLIC BLOOD PRESSURE: 62 MMHG

## 2021-07-13 DIAGNOSIS — G47.33 OSA (OBSTRUCTIVE SLEEP APNEA): Primary | ICD-10-CM

## 2021-07-13 PROCEDURE — G0463 HOSPITAL OUTPT CLINIC VISIT: HCPCS

## 2021-07-15 ENCOUNTER — TELEPHONE (OUTPATIENT)
Dept: GASTROENTEROLOGY | Facility: CLINIC | Age: 51
End: 2021-07-15

## 2021-07-15 NOTE — TELEPHONE ENCOUNTER
Screening colonoscopy-- father hx of polyps-- grandfather hx of colon ca-- no ASA or blood thinners-- medications:    Allegra  Flonase   Vitamin D3  Multivitamin  Hair Skin Nails     OA form scanned into media

## 2021-07-16 ENCOUNTER — APPOINTMENT (OUTPATIENT)
Dept: GENERAL RADIOLOGY | Facility: HOSPITAL | Age: 51
End: 2021-07-16

## 2021-07-16 ENCOUNTER — HOSPITAL ENCOUNTER (OUTPATIENT)
Facility: HOSPITAL | Age: 51
Setting detail: OBSERVATION
Discharge: HOME | End: 2021-07-17
Attending: EMERGENCY MEDICINE | Admitting: HOSPITALIST

## 2021-07-16 ENCOUNTER — APPOINTMENT (OUTPATIENT)
Dept: CT IMAGING | Facility: HOSPITAL | Age: 51
End: 2021-07-16

## 2021-07-16 ENCOUNTER — APPOINTMENT (OUTPATIENT)
Dept: CARDIOLOGY | Facility: HOSPITAL | Age: 51
End: 2021-07-16

## 2021-07-16 DIAGNOSIS — R29.90 STROKE-LIKE SYMPTOMS: Primary | ICD-10-CM

## 2021-07-16 LAB
ABO GROUP BLD: NORMAL
ALBUMIN SERPL-MCNC: 4.4 G/DL (ref 3.5–5.2)
ALBUMIN/GLOB SERPL: 1.8 G/DL
ALP SERPL-CCNC: 77 U/L (ref 39–117)
ALT SERPL W P-5'-P-CCNC: 14 U/L (ref 1–33)
ANION GAP SERPL CALCULATED.3IONS-SCNC: 9.6 MMOL/L (ref 5–15)
AST SERPL-CCNC: 13 U/L (ref 1–32)
BASOPHILS # BLD AUTO: 0.07 10*3/MM3 (ref 0–0.2)
BASOPHILS NFR BLD AUTO: 1.3 % (ref 0–1.5)
BILIRUB SERPL-MCNC: 0.5 MG/DL (ref 0–1.2)
BLD GP AB SCN SERPL QL: NEGATIVE
BUN SERPL-MCNC: 11 MG/DL (ref 6–20)
BUN/CREAT SERPL: 15.3 (ref 7–25)
CALCIUM SPEC-SCNC: 9.2 MG/DL (ref 8.6–10.5)
CHLORIDE SERPL-SCNC: 105 MMOL/L (ref 98–107)
CO2 SERPL-SCNC: 26.4 MMOL/L (ref 22–29)
CREAT SERPL-MCNC: 0.72 MG/DL (ref 0.57–1)
DEPRECATED RDW RBC AUTO: 40.7 FL (ref 37–54)
EOSINOPHIL # BLD AUTO: 0.1 10*3/MM3 (ref 0–0.4)
EOSINOPHIL NFR BLD AUTO: 1.8 % (ref 0.3–6.2)
ERYTHROCYTE [DISTWIDTH] IN BLOOD BY AUTOMATED COUNT: 12.5 % (ref 12.3–15.4)
GFR SERPL CREATININE-BSD FRML MDRD: 86 ML/MIN/1.73
GLOBULIN UR ELPH-MCNC: 2.5 GM/DL
GLUCOSE BLDC GLUCOMTR-MCNC: 103 MG/DL (ref 70–130)
GLUCOSE BLDC GLUCOMTR-MCNC: 126 MG/DL (ref 70–130)
GLUCOSE BLDC GLUCOMTR-MCNC: 226 MG/DL (ref 70–130)
GLUCOSE SERPL-MCNC: 105 MG/DL (ref 65–99)
HCT VFR BLD AUTO: 40.4 % (ref 34–46.6)
HGB BLD-MCNC: 13.6 G/DL (ref 12–15.9)
HOLD SPECIMEN: NORMAL
HOLD SPECIMEN: NORMAL
IMM GRANULOCYTES # BLD AUTO: 0.01 10*3/MM3 (ref 0–0.05)
IMM GRANULOCYTES NFR BLD AUTO: 0.2 % (ref 0–0.5)
INR PPP: 0.96 (ref 0.9–1.1)
LYMPHOCYTES # BLD AUTO: 2.34 10*3/MM3 (ref 0.7–3.1)
LYMPHOCYTES NFR BLD AUTO: 42.1 % (ref 19.6–45.3)
MCH RBC QN AUTO: 29.8 PG (ref 26.6–33)
MCHC RBC AUTO-ENTMCNC: 33.7 G/DL (ref 31.5–35.7)
MCV RBC AUTO: 88.4 FL (ref 79–97)
MONOCYTES # BLD AUTO: 0.31 10*3/MM3 (ref 0.1–0.9)
MONOCYTES NFR BLD AUTO: 5.6 % (ref 5–12)
NEUTROPHILS NFR BLD AUTO: 2.73 10*3/MM3 (ref 1.7–7)
NEUTROPHILS NFR BLD AUTO: 49 % (ref 42.7–76)
NRBC BLD AUTO-RTO: 0 /100 WBC (ref 0–0.2)
PLATELET # BLD AUTO: 234 10*3/MM3 (ref 140–450)
PMV BLD AUTO: 10.6 FL (ref 6–12)
POTASSIUM SERPL-SCNC: 3.7 MMOL/L (ref 3.5–5.2)
PROT SERPL-MCNC: 6.9 G/DL (ref 6–8.5)
PROTHROMBIN TIME: 12.6 SECONDS (ref 11.7–14.2)
QT INTERVAL: 380 MS
RBC # BLD AUTO: 4.57 10*6/MM3 (ref 3.77–5.28)
RH BLD: NEGATIVE
SARS-COV-2 RNA RESP QL NAA+PROBE: NOT DETECTED
SODIUM SERPL-SCNC: 141 MMOL/L (ref 136–145)
T&S EXPIRATION DATE: NORMAL
WBC # BLD AUTO: 5.56 10*3/MM3 (ref 3.4–10.8)
WHOLE BLOOD HOLD SPECIMEN: NORMAL

## 2021-07-16 PROCEDURE — 99214 OFFICE O/P EST MOD 30 MIN: CPT | Performed by: PSYCHIATRY & NEUROLOGY

## 2021-07-16 PROCEDURE — 70450 CT HEAD/BRAIN W/O DYE: CPT

## 2021-07-16 PROCEDURE — 82962 GLUCOSE BLOOD TEST: CPT

## 2021-07-16 PROCEDURE — 63710000001 DIPHENHYDRAMINE PER 50 MG: Performed by: PSYCHIATRY & NEUROLOGY

## 2021-07-16 PROCEDURE — 85025 COMPLETE CBC W/AUTO DIFF WBC: CPT | Performed by: EMERGENCY MEDICINE

## 2021-07-16 PROCEDURE — U0003 INFECTIOUS AGENT DETECTION BY NUCLEIC ACID (DNA OR RNA); SEVERE ACUTE RESPIRATORY SYNDROME CORONAVIRUS 2 (SARS-COV-2) (CORONAVIRUS DISEASE [COVID-19]), AMPLIFIED PROBE TECHNIQUE, MAKING USE OF HIGH THROUGHPUT TECHNOLOGIES AS DESCRIBED BY CMS-2020-01-R: HCPCS | Performed by: EMERGENCY MEDICINE

## 2021-07-16 PROCEDURE — 71045 X-RAY EXAM CHEST 1 VIEW: CPT

## 2021-07-16 PROCEDURE — 93226 XTRNL ECG REC<48 HR SCAN A/R: CPT

## 2021-07-16 PROCEDURE — G0378 HOSPITAL OBSERVATION PER HR: HCPCS

## 2021-07-16 PROCEDURE — 80053 COMPREHEN METABOLIC PANEL: CPT | Performed by: EMERGENCY MEDICINE

## 2021-07-16 PROCEDURE — 25010000002 MAGNESIUM SULFATE IN D5W 1G/100ML (PREMIX) 1-5 GM/100ML-% SOLUTION: Performed by: PSYCHIATRY & NEUROLOGY

## 2021-07-16 PROCEDURE — C9803 HOPD COVID-19 SPEC COLLECT: HCPCS

## 2021-07-16 PROCEDURE — 86901 BLOOD TYPING SEROLOGIC RH(D): CPT | Performed by: EMERGENCY MEDICINE

## 2021-07-16 PROCEDURE — 93010 ELECTROCARDIOGRAM REPORT: CPT | Performed by: INTERNAL MEDICINE

## 2021-07-16 PROCEDURE — 99284 EMERGENCY DEPT VISIT MOD MDM: CPT

## 2021-07-16 PROCEDURE — 96374 THER/PROPH/DIAG INJ IV PUSH: CPT

## 2021-07-16 PROCEDURE — 85610 PROTHROMBIN TIME: CPT | Performed by: EMERGENCY MEDICINE

## 2021-07-16 PROCEDURE — 99219 PR INITIAL OBSERVATION CARE/DAY 50 MINUTES: CPT | Performed by: INTERNAL MEDICINE

## 2021-07-16 PROCEDURE — 93225 XTRNL ECG REC<48 HRS REC: CPT

## 2021-07-16 PROCEDURE — 93005 ELECTROCARDIOGRAM TRACING: CPT | Performed by: EMERGENCY MEDICINE

## 2021-07-16 PROCEDURE — 25010000002 PROCHLORPERAZINE 10 MG/2ML SOLUTION: Performed by: PSYCHIATRY & NEUROLOGY

## 2021-07-16 PROCEDURE — 86850 RBC ANTIBODY SCREEN: CPT | Performed by: EMERGENCY MEDICINE

## 2021-07-16 PROCEDURE — 86900 BLOOD TYPING SEROLOGIC ABO: CPT | Performed by: EMERGENCY MEDICINE

## 2021-07-16 RX ORDER — ATORVASTATIN CALCIUM 80 MG/1
80 TABLET, FILM COATED ORAL NIGHTLY
Status: DISCONTINUED | OUTPATIENT
Start: 2021-07-16 | End: 2021-07-17 | Stop reason: HOSPADM

## 2021-07-16 RX ORDER — SODIUM CHLORIDE 0.9 % (FLUSH) 0.9 %
10 SYRINGE (ML) INJECTION EVERY 12 HOURS SCHEDULED
Status: DISCONTINUED | OUTPATIENT
Start: 2021-07-16 | End: 2021-07-17 | Stop reason: HOSPADM

## 2021-07-16 RX ORDER — PROCHLORPERAZINE EDISYLATE 5 MG/ML
10 INJECTION INTRAMUSCULAR; INTRAVENOUS ONCE
Status: COMPLETED | OUTPATIENT
Start: 2021-07-16 | End: 2021-07-16

## 2021-07-16 RX ORDER — ASPIRIN 300 MG/1
300 SUPPOSITORY RECTAL DAILY
Status: DISCONTINUED | OUTPATIENT
Start: 2021-07-16 | End: 2021-07-17 | Stop reason: HOSPADM

## 2021-07-16 RX ORDER — SODIUM CHLORIDE 0.9 % (FLUSH) 0.9 %
10 SYRINGE (ML) INJECTION AS NEEDED
Status: DISCONTINUED | OUTPATIENT
Start: 2021-07-16 | End: 2021-07-17 | Stop reason: HOSPADM

## 2021-07-16 RX ORDER — NITROGLYCERIN 0.4 MG/1
0.4 TABLET SUBLINGUAL
Status: DISCONTINUED | OUTPATIENT
Start: 2021-07-16 | End: 2021-07-17 | Stop reason: HOSPADM

## 2021-07-16 RX ORDER — DIPHENHYDRAMINE HCL 25 MG
25 CAPSULE ORAL ONCE
Status: COMPLETED | OUTPATIENT
Start: 2021-07-16 | End: 2021-07-16

## 2021-07-16 RX ORDER — MAGNESIUM SULFATE 1 G/100ML
1 INJECTION INTRAVENOUS ONCE
Status: COMPLETED | OUTPATIENT
Start: 2021-07-16 | End: 2021-07-16

## 2021-07-16 RX ORDER — ASPIRIN 325 MG
325 TABLET ORAL ONCE
Status: COMPLETED | OUTPATIENT
Start: 2021-07-16 | End: 2021-07-16

## 2021-07-16 RX ORDER — ASPIRIN 81 MG/1
81 TABLET, CHEWABLE ORAL DAILY
Status: DISCONTINUED | OUTPATIENT
Start: 2021-07-16 | End: 2021-07-17 | Stop reason: HOSPADM

## 2021-07-16 RX ADMIN — ASPIRIN 325 MG: 325 TABLET ORAL at 09:50

## 2021-07-16 RX ADMIN — SODIUM CHLORIDE, PRESERVATIVE FREE 10 ML: 5 INJECTION INTRAVENOUS at 20:32

## 2021-07-16 RX ADMIN — DIPHENHYDRAMINE HYDROCHLORIDE 25 MG: 25 CAPSULE ORAL at 17:05

## 2021-07-16 RX ADMIN — ATORVASTATIN CALCIUM 80 MG: 80 TABLET, FILM COATED ORAL at 20:31

## 2021-07-16 RX ADMIN — ASPIRIN 81 MG: 81 TABLET, CHEWABLE ORAL at 17:05

## 2021-07-16 RX ADMIN — PROCHLORPERAZINE EDISYLATE 10 MG: 5 INJECTION INTRAMUSCULAR; INTRAVENOUS at 17:04

## 2021-07-16 RX ADMIN — MAGNESIUM SULFATE HEPTAHYDRATE 1 G: 1 INJECTION, SOLUTION INTRAVENOUS at 17:05

## 2021-07-16 NOTE — ED PROVIDER NOTES
EMERGENCY DEPARTMENT ENCOUNTER  Patient was placed in face mask in first look and the following protective measures were taken unless additional measures were taken and documented below in the ED course. Patient was wearing facemask when I entered the room and throughout our encounter. I wore full protective equipment throughout this patient encounter including a face mask, and gloves. Hand hygiene was performed before donning protective equipment and after removal when leaving the room.    Room Number:  36/36  Date of encounter:  7/16/2021  PCP: Felix Quan MD    HPI:  Context: Aleena Wilder is a 50 y.o. female who presents to the ED c/o chief complaint of left-sided weakness and numbness.  Patient reports she initially began having some dull pain in her left shoulder yesterday.  Pain does not radiate, is not reproducible, has no difficulty moving her shoulder, no difficulty performing normal activities.  Patient also noticed numbness in her left arm that began yesterday morning.  Patient woke this morning and now complains of numbness and tingling in the entire left side of her body, greatest in her face and arm, less in her leg.  Patient complains of weakness in her arm and leg, greater arm.  Patient denies any headache, no visual disturbance, no facial droop, no difficulty with speech, no ringing or ears, no hearing loss, no vertigo, no difficulty with balance.    MEDICAL HISTORY REVIEW  Reviewed in EPIC    PAST MEDICAL HISTORY  Active Ambulatory Problems     Diagnosis Date Noted   • Allergic rhinitis 02/09/2016   • Eczema 02/09/2016   • Mild intermittent asthma without complication 01/02/2018   • Chronic midline low back pain without sciatica 01/02/2018   • RENETTA (obstructive sleep apnea) 04/24/2018   • Endometriosis 05/31/2018   • Right ureteral calculus 07/31/2020   • Vitamin D deficiency 01/21/2021   • IGT (impaired glucose tolerance) 01/22/2021     Resolved Ambulatory Problems     Diagnosis Date  Noted   • Anxiety disorder 02/09/2016   • Allergic dermatitis due to poison ivy 02/09/2016   • Cough 02/09/2016   • Febrile 02/09/2016   • Spasm 02/09/2016   • Infection of skin and subcutaneous tissue 02/09/2016   • Infection of urinary tract 02/09/2016   • Disease caused by virus 02/09/2016   • Urethritis 02/09/2016   • Daytime somnolence 04/24/2018     Past Medical History:   Diagnosis Date   • Anxiety    • History of panic attacks    • Kidney stones    • Renal cyst    • Sleep apnea        PAST SURGICAL HISTORY  Past Surgical History:   Procedure Laterality Date   • APPENDECTOMY N/A 11/2006   • CYSTOSCOPY, RETROGRADE PYELOGRAM AND STENT INSERTION Right 7/31/2020    Procedure: CYSTO, STENT PLACEMENT, RIGHT RETROGRADE PYELOGRAM;  Surgeon: Nii Ca MD;  [place] [facility] [place] [facility] [place]  Service: Urology;  Laterality: Right;   • DIAGNOSTIC LAPAROSCOPY N/A 2006   • TOTAL ABDOMINAL HYSTERECTOMY WITH SALPINGO OOPHORECTOMY Bilateral 11/2006       FAMILY HISTORY  Family History   Problem Relation Age of Onset   • Cancer Other         colon   • Diabetes Other    • Hyperlipidemia Other    • Hypertension Other    • Other Other    • Colon cancer Paternal Grandfather         Late 60s or early 70s   • Colon cancer Cousin         Colon cancer, 50s, paternal cousin   • Diabetes Mother    • Heart attack Mother    • Breast cancer Mother    • Melanoma Mother    • Alcohol abuse Father    • COPD Father    • Heart attack Father    • Heart failure Father    • Hyperlipidemia Father    • Melanoma Father    • Colon polyps Father    • No Known Problems Sister    • No Known Problems Brother    • No Known Problems Son    • No Known Problems Son    • No Known Problems Daughter    • Malig Hyperthermia Neg Hx        SOCIAL HISTORY  Social History     Socioeconomic History   • Marital status:      Spouse name: Not on file   • Number of children: Not on file   • Years of education: Not on file   • Highest education level: Not on file    Tobacco Use   • Smoking status: Never Smoker   • Smokeless tobacco: Never Used   Substance and Sexual Activity   • Alcohol use: Yes     Alcohol/week: 2.0 standard drinks     Types: 2 Standard drinks or equivalent per week     Comment: social   • Drug use: No   • Sexual activity: Yes     Partners: Male       ALLERGIES  Amoxicillin and Tramadol    The patient's allergies have been reviewed    REVIEW OF SYSTEMS  All systems reviewed and negative except for those discussed in HPI.     PHYSICAL EXAM  I have reviewed the triage vital signs and nursing notes.  ED Triage Vitals [07/16/21 0842]   Temp Heart Rate Resp BP SpO2   96.4 °F (35.8 °C) 91 16 111/78 98 %      Temp src Heart Rate Source Patient Position BP Location FiO2 (%)   -- -- -- -- --       General: No acute distress.  HENT: NCAT, PERRL, Nares patent.  Eyes: no scleral icterus.  Neck: trachea midline, no ROM limitations.  CV: regular rhythm, regular rate.  Respiratory: normal effort, CTAB.  Abdomen: soft, nondistended, NTTP, no rebound tenderness, no guarding or rigidity  : deferred.  Musculoskeletal: no deformity.  Neuro: alert and oriented x3, no facial droop, cranial nerves II through XII grossly intact, moves all extremities well, 5 out of 5 strength and all extremities other than slightly decreased strength and left lower extremity, 4+ out of 5.  Patient has normal  strength, no pronator drift.  Sensation is intact to light touch all extremities although slightly decreased on the left side.  No ataxia, no tremor.  Skin: warm, dry.    NIHSS:  0-->Alert: keenly responsive  0-->Answers both questions correctly  0-->Performs both tasks correctly  0=normal  0=No visual loss  0=Normal symmetric movement  0-->No drift: limb holds 90 (or 45) degrees for full 10 secs  0-->No drift: limb holds 90 (or 45) degrees for full 10 secs  1-->Drift: limb holds 90 (or 45) degrees, but drifts down before full 10 seconds: does not hit bed or other support  0-->No drift:  limb holds 90 (or 45) degrees for full 10 secs  0=Absent  1=Mild to moderate sensory loss; patient feels pinprick is less sharp or is dull on the affected side; there is a loss of superficial pain with pinprick but patient is aware She is being touched  0=No aphasia, normal  0=Normal  0=No abnormality  Total score: 2      LAB RESULTS  Recent Results (from the past 24 hour(s))   Comprehensive Metabolic Panel    Collection Time: 07/16/21  9:03 AM    Specimen: Blood   Result Value Ref Range    Glucose 105 (H) 65 - 99 mg/dL    BUN 11 6 - 20 mg/dL    Creatinine 0.72 0.57 - 1.00 mg/dL    Sodium 141 136 - 145 mmol/L    Potassium 3.7 3.5 - 5.2 mmol/L    Chloride 105 98 - 107 mmol/L    CO2 26.4 22.0 - 29.0 mmol/L    Calcium 9.2 8.6 - 10.5 mg/dL    Total Protein 6.9 6.0 - 8.5 g/dL    Albumin 4.40 3.50 - 5.20 g/dL    ALT (SGPT) 14 1 - 33 U/L    AST (SGOT) 13 1 - 32 U/L    Alkaline Phosphatase 77 39 - 117 U/L    Total Bilirubin 0.5 0.0 - 1.2 mg/dL    eGFR Non African Amer 86 >60 mL/min/1.73    Globulin 2.5 gm/dL    A/G Ratio 1.8 g/dL    BUN/Creatinine Ratio 15.3 7.0 - 25.0    Anion Gap 9.6 5.0 - 15.0 mmol/L   Protime-INR    Collection Time: 07/16/21  9:03 AM    Specimen: Blood   Result Value Ref Range    Protime 12.6 11.7 - 14.2 Seconds    INR 0.96 0.90 - 1.10   Type & Screen    Collection Time: 07/16/21  9:03 AM    Specimen: Blood   Result Value Ref Range    ABO Type A     RH type Negative     Antibody Screen Negative     T&S Expiration Date 7/19/2021 11:59:59 PM    CBC Auto Differential    Collection Time: 07/16/21  9:03 AM    Specimen: Blood   Result Value Ref Range    WBC 5.56 3.40 - 10.80 10*3/mm3    RBC 4.57 3.77 - 5.28 10*6/mm3    Hemoglobin 13.6 12.0 - 15.9 g/dL    Hematocrit 40.4 34.0 - 46.6 %    MCV 88.4 79.0 - 97.0 fL    MCH 29.8 26.6 - 33.0 pg    MCHC 33.7 31.5 - 35.7 g/dL    RDW 12.5 12.3 - 15.4 %    RDW-SD 40.7 37.0 - 54.0 fl    MPV 10.6 6.0 - 12.0 fL    Platelets 234 140 - 450 10*3/mm3    Neutrophil % 49.0 42.7 -  76.0 %    Lymphocyte % 42.1 19.6 - 45.3 %    Monocyte % 5.6 5.0 - 12.0 %    Eosinophil % 1.8 0.3 - 6.2 %    Basophil % 1.3 0.0 - 1.5 %    Immature Grans % 0.2 0.0 - 0.5 %    Neutrophils, Absolute 2.73 1.70 - 7.00 10*3/mm3    Lymphocytes, Absolute 2.34 0.70 - 3.10 10*3/mm3    Monocytes, Absolute 0.31 0.10 - 0.90 10*3/mm3    Eosinophils, Absolute 0.10 0.00 - 0.40 10*3/mm3    Basophils, Absolute 0.07 0.00 - 0.20 10*3/mm3    Immature Grans, Absolute 0.01 0.00 - 0.05 10*3/mm3    nRBC 0.0 0.0 - 0.2 /100 WBC   Green Top (Gel)    Collection Time: 07/16/21  9:03 AM   Result Value Ref Range    Extra Tube Hold for add-ons.    Lavender Top    Collection Time: 07/16/21  9:03 AM   Result Value Ref Range    Extra Tube hold for add-on    Gold Top - SST    Collection Time: 07/16/21  9:03 AM   Result Value Ref Range    Extra Tube Hold for add-ons.    ECG 12 Lead    Collection Time: 07/16/21  9:07 AM   Result Value Ref Range    QT Interval 380 ms   POC Glucose Once    Collection Time: 07/16/21  9:08 AM    Specimen: Blood   Result Value Ref Range    Glucose 103 70 - 130 mg/dL       I ordered the above labs and reviewed the results.    RADIOLOGY  XR Chest 1 View    Result Date: 7/16/2021  CHEST SINGLE VIEW  HISTORY: Stroke protocol. Chest pain with left arm numbness.  COMPARISON: Two-view chest 10/20/2017.  FINDINGS: Cardiomediastinal silhouette is within normal limits. Lungs appear clear and there is no evidence of pulmonary edema or pleural effusion or infiltrate or change when compared to the previous exam.        No acute disease.  This report was finalized on 7/16/2021 9:57 AM by Dr. Vitaly Nagel M.D.      CT Head Without Contrast Stroke Protocol    Result Date: 7/16/2021  EXAM:  CT HEAD WO CONTRAST STROKE PROTOCOL-  HISTORY:  Left-sided numbness began yesterday morning, now with left-sided weakness that began today.  COMPARISON:  CT head without contrast 07/17/2006.  TECHNIQUE: Noncontrast images of the brain were obtained.  Reformatted images were reviewed. Radiation dose reduction techniques were utilized, including automated exposure control and exposure modulation based on body size.  FINDINGS:   The ventricles and sulci are within normal limits.  No acute intracranial hemorrhage or pathologic extra-axial collection is identified.  There is no midline shift or mass effect.  The basal cisterns are patent.  The grey-white matter differentiation is maintained. There is calcific intracranial atherosclerosis.       No acute intracranial abnormality. If there is clinical concern for acute infarction, this would be better assessed with MRI.  Discussed with Dr. Samuels at 9:46 AM.  This report was finalized on 7/16/2021 9:46 AM by Dr. Nahed Ferraro M.D.        I ordered the above noted radiological studies. I reviewed the images and results. I agree with the radiologist interpretation.    PROCEDURES  Procedures    MEDICATIONS GIVEN IN ER  Medications   sodium chloride 0.9 % flush 10 mL (has no administration in time range)   aspirin tablet 325 mg (325 mg Oral Given 7/16/21 0950)       PROGRESS, DATA ANALYSIS, CONSULTS, AND MEDICAL DECISION MAKING  A complete history and physical exam have been performed.  All available laboratory and imaging results have been reviewed by myself prior to disposition.    MDM  After the initial H&P, I discussed pertinent information from history and physical exam with patient/family.  Discussed differential diagnosis.  Discussed plan for ED evaluation/work-up/treatment.  All questions answered.  Patient/family is agreeable with plan.  ED Course as of Jul 16 1055 Fri Jul 16, 2021   0848 Patient presents with neuro symptoms, I immediately went to assess her.    [JG]   6995 Patient symptoms began yesterday morning, not a TPA candidate given outside therapeutic window, current NIH is 2, no signs of large vessel occlusion, no indication for team the activation.  Consulting stroke neurology.    [JG]   7010 Phone  call with Dr. Crocker, stroke neurology.  Discussed the patient, relevant history, exam, diagnostics, ED findings/progress, and concerns.  He agrees that patient is not a TPA or interventional candidate, recommends obtaining basic stroke work-up with CT head, no further advanced imaging, plan for admission to the hospitalist for completion of stroke work-up.        [JG]   0950 EKG independently viewed and contemporaneously interpreted by ED physician. Time: 9:07 AM.  Rate 69.  Interpretation: Normal sinus rhythm, normal axis, short MN interval, normal QRS, no acute ST changes.    [JG]   0998 I reviewed CT head imaging in PACS, my findings are: Negative for intracranial hemorrhage.  Pending official read by radiology.    [JG]   5168 Phone call with Dr. Quan.  Discussed the patient, relevant history, exam, diagnostics, ED findings/progress, and concerns. They agree to admit the patient to observation telemetry. Care assumed by the admitting physician at this time.        [JG]      ED Course User Index  [JG] Sam Samuels MD       AS OF 10:55 EDT VITALS:    BP - 111/78  HR - 91  TEMP - 96.4 °F (35.8 °C)  O2 SATS - 98%    DIAGNOSIS  Final diagnoses:   Stroke-like symptoms     Critical care:  Total critical care time of 32 minutes is exclusive of any other billable procedures and includes time spent with direct patient care and observation, retrospective chart review, management of acute condition, and consultation with other physicians.      DISPOSITION  ADMISSION    Discussed treatment plan and reason for admission with pt/family and admitting physician.  Pt/family voiced understanding of the plan for admission for further testing/treatment as needed.          Sam Samuels MD  07/16/21 [id number]

## 2021-07-16 NOTE — ED NOTES
Patient states she noticed numbness on her left side that she noticed early morning yesterday or last night. Patient is unsure of exact onset. Patient states she has mild weakness on her left leg, left arm and has slight left sided facial droop.      Yuni Tracy RN  07/16/21 5124

## 2021-07-16 NOTE — H&P
Patient Care Team:  Felix Quan MD as PCP - General (Internal Medicine)    Chief complaint   Chief Complaint   Patient presents with   • Numbness        Subjective     Patient is a 50 y.o. female presents with onset of aching in the left supraclavicular area about 36 hours prior to admission followed by numbness in the left arm including the middle and index fingers.  She awoke on the morning of admission with the development of numbness in the left face and lower extremity and a dull left-sided headache.  There is some vague weakness in the left arm and leg.  She had a history of migraines up until around the age of 37 at which time she had a total abdominal hysterectomy and bilateral oophorectomy for advanced endometriosis.  Rare migraines since then.  There is some history of being an epilepsy suspect in grade school at which time she had fainting spells.  This was never treated however.  She does have a history of chronic anxiety.  Chronic allergies.  Otherwise she has been very healthy.  She does have impaired glucose tolerance.  No history of hypertension.    Review of Systems  No cardiac symptoms.  No pulmonary symptoms.  No GI symptoms.  All other pertinent items are noted in HPI, all other systems reviewed and negative    History    Current Facility-Administered Medications:   •  sodium chloride 0.9 % flush 10 mL, 10 mL, Intravenous, PRN, Sam Samuels MD    Current Outpatient Medications:   •  ALLERGY SERUM INJECTION, Inject 1 mL under the skin into the appropriate area as directed 1 (One) Time Per Week., Disp: , Rfl:   •  Cholecalciferol (Vitamin D3) 50 MCG (2000 UT) tablet, Take 2,000 Units by mouth Daily., Disp: , Rfl:   •  fexofenadine (ALLEGRA) 180 MG tablet, Take 180 mg by mouth Daily., Disp: , Rfl:   •  fluticasone (FLONASE) 50 MCG/ACT nasal spray, 2 sprays into each nostril Daily., Disp: , Rfl:   •  Multiple Vitamins-Minerals (MULTIVITAMIN PO), Take 1 tablet by mouth Daily., Disp: ,  Rfl:   Past Medical History:   Diagnosis Date   • Anxiety    • Endometriosis    • History of panic attacks    • Kidney stones    • Renal cyst    • Sleep apnea     uses CPAP     Past Surgical History:   Procedure Laterality Date   • APPENDECTOMY N/A 11/2006   • CYSTOSCOPY, RETROGRADE PYELOGRAM AND STENT INSERTION Right 7/31/2020    Procedure: CYSTO, STENT PLACEMENT, RIGHT RETROGRADE PYELOGRAM;  Surgeon: Nii Ca MD;  [facility]: [facility] OR [facility] [place];  Service: Urology;  Laterality: Right;   • DIAGNOSTIC LAPAROSCOPY N/A 2006   • TOTAL ABDOMINAL HYSTERECTOMY WITH SALPINGO OOPHORECTOMY Bilateral 11/2006     Family History   Problem Relation Age of Onset   • Cancer Other         colon   • Diabetes Other    • Hyperlipidemia Other    • Hypertension Other    • Other Other    • Colon cancer Paternal Grandfather         Late 60s or early 70s   • Colon cancer Cousin         Colon cancer, 50s, paternal cousin   • Diabetes Mother    • Heart attack Mother    • Breast cancer Mother    • Melanoma Mother    • Alcohol abuse Father    • COPD Father    • Heart attack Father    • Heart failure Father    • Hyperlipidemia Father    • Melanoma Father    • Colon polyps Father    • No Known Problems Sister    • No Known Problems Brother    • No Known Problems Son    • No Known Problems Son    • No Known Problems Daughter    • Malig Hyperthermia Neg Hx      Social History     Tobacco Use   • Smoking status: Never Smoker   • Smokeless tobacco: Never Used   Substance Use Topics   • Alcohol use: Yes     Alcohol/week: 2.0 standard drinks     Types: 2 Standard drinks or equivalent per week     Comment: social   • Drug use: No     (Not in a hospital admission)    Allergies:  Amoxicillin and Tramadol    Objective     Vital Signs  Temp:  [96.4 °F (35.8 °C)] 96.4 °F (35.8 °C)  Heart Rate:  [77-91] 82  Resp:  [16] 16  BP: (107-111)/(77-78) 107/77    Physical Exam:      General Appearance:    Alert, cooperative, in no acute distress   Head:     Normocephalic, without obvious abnormality, atraumatic   Eyes:            Lids and lashes normal, conjunctivae and sclerae normal, no   icterus, no pallor, corneas clear, PERRLA   Ears:    Ears appear intact with no abnormalities noted   Throat:   No oral lesions, no thrush, oral mucosa moist, tongue is midline   Neck:   No adenopathy, supple, trachea midline, no thyromegaly, no     carotid bruit, no JVD   Back:        Lungs:     Clear to auscultation,respirations regular, even and                   unlabored    Heart:    Regular rhythm and normal rate, normal S1 and S2, no            murmur, no gallop, no rub, no click   Breast Exam:    Deferred   Abdomen:     Normal bowel sounds, no masses, no organomegaly, soft        non-tender, non-distended, no guarding, no rebound                 tenderness   Genitalia:    Deferred   Extremities:   Moves all extremities well, no edema, no cyanosis, no              redness   Pulses:   Pulses palpable and equal bilaterally   Skin:   No bleeding, bruising or rash   Lymph nodes:   No palpable adenopathy   Neurologic:   Cranial nerves 2 - 12 grossly intact, sensation intact, DTR        present and equal bilaterally, cerebellar finger-to-nose normal, there is no pronator drift, no clear evidence of motor weakness         Results Review:    I reviewed the patient's new clinical results.  Discussed with the emergency room physician.    Assessment/Plan       Stroke-like symptoms  No evidence of thoracic outlet on the exam  Mostly hemisensory symptoms left side  History of migraines  Potential history of seizures although that sounds unlikely  Chronic anxiety    The current symptom complex is hard to put together.  Certainly the left shoulder pain and supraclavicular pain component to the onset of her symptoms is unusual in the setting of stroke.  Also mild left-sided headache would be unusual, especially in what would be a very small stroke.  This could be small vessel disease.  This  could be a migraine equivalent but the duration of symptoms would be unusual in that event.  An MRI of the brain is pending.  We will add an EEG.  Also a TTE and 24-hour Holter.  Neurology has been consulted.    I discussed the patients findings and my recommendations with patient and family.     Felix Quan MD  07/16/21  12:47 EDT    Dragon disclaimer:   Much of this encounter note is an electronic transcription/translation of spoken language to printed text. The electronic translation of spoken language may permit erroneous, or at times, nonsensical words or phrases to be inadvertently transcribed; Although I have reviewed the note for such errors, some may still exist.

## 2021-07-16 NOTE — CONSULTS
Neurology Consult Note    Consult Date: 7/16/2021    Referring MD: Felix Quan MD    Reason for Consult I have been asked to see the patient in neurological consultation to render advice and opinion regarding left-sided numbness and weakness    Aleena Wilder is a 50 y.o. female with past medical history of migraine with aura which remitted after hysterectomy a number of years ago.  She denies any cardiac history or atrial fibrillation.  She developed fairly abrupt onset of left shoulder pain and arm numbness and weakness yesterday.  Today she noticed some tingling in the left face and leg as well.  She denies any facial droop or difficulty walking but feels that her left hand is slightly weak.  She developed a mild associated headache but not to the degree of pain as her previous migraines.  She also reports some associated cognitive slowing.    Past Medical/Surgical Hx:  Past Medical History:   Diagnosis Date   • Anxiety    • Endometriosis    • History of panic attacks    • Kidney stones    • Renal cyst    • Sleep apnea     uses CPAP     Past Surgical History:   Procedure Laterality Date   • APPENDECTOMY N/A 11/2006   • CYSTOSCOPY, RETROGRADE PYELOGRAM AND STENT INSERTION Right 7/31/2020    Procedure: CYSTO, STENT PLACEMENT, RIGHT RETROGRADE PYELOGRAM;  Surgeon: Nii Ca MD;  Location: [facility] OR [facility] [place];  Service: Urology;  Laterality: Right;   • DIAGNOSTIC LAPAROSCOPY N/A 2006   • TOTAL ABDOMINAL HYSTERECTOMY WITH SALPINGO OOPHORECTOMY Bilateral 11/2006       Medications On Admission  Medications Prior to Admission   Medication Sig Dispense Refill Last Dose   • ALLERGY SERUM INJECTION Inject 1 mL under the skin into the appropriate area as directed 1 (One) Time Per Week.      • Cholecalciferol (Vitamin D3) 50 MCG (2000 UT) tablet Take 2,000 Units by mouth Daily.      • fexofenadine (ALLEGRA) 180 MG tablet Take 180 mg by mouth Daily.      • fluticasone (FLONASE) 50 MCG/ACT nasal spray 2 sprays into  each nostril Daily.      • Multiple Vitamins-Minerals (MULTIVITAMIN PO) Take 1 tablet by mouth Daily.          Allergies:  Allergies   Allergen Reactions   • Amoxicillin Diarrhea and GI Intolerance     Abdominal cramps   • Tramadol Dizziness and Hallucinations       Social Hx:  Social History     Socioeconomic History   • Marital status:      Spouse name: Not on file   • Number of children: Not on file   • Years of education: Not on file   • Highest education level: Not on file   Tobacco Use   • Smoking status: Never Smoker   • Smokeless tobacco: Never Used   Substance and Sexual Activity   • Alcohol use: Yes     Alcohol/week: 2.0 standard drinks     Types: 2 Standard drinks or equivalent per week     Comment: social   • Drug use: No   • Sexual activity: Yes     Partners: Male       Family Hx:  Family History   Problem Relation Age of Onset   • Cancer Other         colon   • Diabetes Other    • Hyperlipidemia Other    • Hypertension Other    • Other Other    • Colon cancer Paternal Grandfather         Late 60s or early 70s   • Colon cancer Cousin         Colon cancer, 50s, paternal cousin   • Diabetes Mother    • Heart attack Mother    • Breast cancer Mother    • Melanoma Mother    • Alcohol abuse Father    • COPD Father    • Heart attack Father    • Heart failure Father    • Hyperlipidemia Father    • Melanoma Father    • Colon polyps Father    • No Known Problems Sister    • No Known Problems Brother    • No Known Problems Son    • No Known Problems Son    • No Known Problems Daughter    • Malig Hyperthermia Neg Hx        Review of systems  Constitutional: [No fevers, chills]  Eye: [No vision loss, diplopia]  HEENT: [no hearing loss, vertigo]  Cardiovascular: [No Chest pain, palpitations]  Neurologic: [+ Weakness, numbness]  Psychiatric: [No anxiety, depression]    All other systems reviewed and are negative    Exam    /70 (BP Location: Left arm, Patient Position: Lying)   Pulse 64   Temp 96.4 °F  "(35.8 °C)   Resp 14   Ht 157.5 cm (62\")   Wt 54 kg (119 lb)   SpO2 98%   BMI 21.77 kg/m²   gen: NAD, vitals reviewed  Eyes: fundus sharp with no papilledema or retinal hemorrhages  HEENT: no nuchal rigidity  CVS: RRR, S1, S2  MS: oriented x3, recent/remote memory intact, normal attention/concentration, language intact, no neglect, normal fund of knowledge  CN: visual acuity grossly normal, visual fields full, PERRL, EOMI, facial sensation equal, no facial droop, hearing symmetric, palate elevates symmetrically, shoulder shrug equal, tongue midline  Motor: 4+/5 left hand , otherwise 5/5 throughout upper and lower extremities, normal tone  Sensation: Diminished to cold temperature left hand  Reflexes: 2+ throughout upper and lower extremities, downgoing plantars  Coordination: no dysmetria with finger to nose bilaterally  Gait: no ataxia, normal station    DATA:    Lab Results   Component Value Date    GLUCOSE 105 (H) 07/16/2021    CALCIUM 9.2 07/16/2021     07/16/2021    K 3.7 07/16/2021    CO2 26.4 07/16/2021     07/16/2021    BUN 11 07/16/2021    CREATININE 0.72 07/16/2021    EGFRIFAFRI 101 01/21/2021    EGFRIFNONA 86 07/16/2021    BCR 15.3 07/16/2021    ANIONGAP 9.6 07/16/2021     Lab Results   Component Value Date    WBC 5.56 07/16/2021    HGB 13.6 07/16/2021    HCT 40.4 07/16/2021    MCV 88.4 07/16/2021     07/16/2021     Lab Results   Component Value Date     (H) 01/21/2021     (H) 01/14/2020     (H) 01/08/2019     Lab Results   Component Value Date    HGBA1C 5.92 (H) 01/21/2021     Lab Results   Component Value Date    INR 0.96 07/16/2021    PROTIME 12.6 07/16/2021       Lab review: CBC, BMP unremarkable    Imaging review: MRI brain, CTA head and neck ordered    Diagnoses:  Left-sided numbness  Left hand weakness  History of migraine with aura    Comment: Suspect migraine with sensory aura on the basis of history, exam and prior history.  Development of a new " type of aura with associated weakness is a relative red flag as well as her anterior shoulder pain.  We will obtain CTA to rule out dissection and MRI of the brain to evaluate for any stroke or secondary cause of headache.  I will treat her presumptively for migraine with Benadryl, Compazine and magnesium    PLAN:  Aspirin, statin  Benadryl, Compazine, magnesium  MRI brain without contrast  CTA head and neck  2D echocardiogram  Okay for diet if she passes bedside swallow evaluation    Recommendations discussed with patient, spouse.  We will follow

## 2021-07-16 NOTE — PROGRESS NOTES
[facility] Acute Inpt Rehab Note     Received request for evaluation via stroke order set.  Please note this is a screen only and Rehab Admissions will not actively be evaluating patient.  If felt patient is appropriate for our services once therapies begin and diagnosis made, please call [phone], to initiate a full referral.    Thanks,   Aileen Fuller RN  Rehab Admission Nurse   [phone]

## 2021-07-17 ENCOUNTER — APPOINTMENT (OUTPATIENT)
Dept: CT IMAGING | Facility: HOSPITAL | Age: 51
End: 2021-07-17

## 2021-07-17 ENCOUNTER — APPOINTMENT (OUTPATIENT)
Dept: MRI IMAGING | Facility: HOSPITAL | Age: 51
End: 2021-07-17

## 2021-07-17 ENCOUNTER — APPOINTMENT (OUTPATIENT)
Dept: CARDIOLOGY | Facility: HOSPITAL | Age: 51
End: 2021-07-17

## 2021-07-17 ENCOUNTER — READMISSION MANAGEMENT (OUTPATIENT)
Dept: CALL CENTER | Facility: HOSPITAL | Age: 51
End: 2021-07-17

## 2021-07-17 VITALS
HEIGHT: 62 IN | DIASTOLIC BLOOD PRESSURE: 77 MMHG | TEMPERATURE: 98.9 F | OXYGEN SATURATION: 97 % | RESPIRATION RATE: 16 BRPM | BODY MASS INDEX: 23.55 KG/M2 | HEART RATE: 85 BPM | SYSTOLIC BLOOD PRESSURE: 104 MMHG | WEIGHT: 128 LBS

## 2021-07-17 PROBLEM — G43.009 NONINTRACTABLE MIGRAINE: Status: ACTIVE | Noted: 2021-07-17

## 2021-07-17 LAB
AORTIC DIMENSIONLESS INDEX: 0.8 (DI)
BH CV ECHO MEAS - ACS: 1.6 CM
BH CV ECHO MEAS - AO MAX PG (FULL): 0.71 MMHG
BH CV ECHO MEAS - AO MAX PG: 6.9 MMHG
BH CV ECHO MEAS - AO MEAN PG (FULL): 1 MMHG
BH CV ECHO MEAS - AO MEAN PG: 4 MMHG
BH CV ECHO MEAS - AO ROOT AREA (BSA CORRECTED): 1.5
BH CV ECHO MEAS - AO ROOT AREA: 4.2 CM^2
BH CV ECHO MEAS - AO ROOT DIAM: 2.3 CM
BH CV ECHO MEAS - AO V2 MAX: 131 CM/SEC
BH CV ECHO MEAS - AO V2 MEAN: 99.6 CM/SEC
BH CV ECHO MEAS - AO V2 VTI: 25.6 CM
BH CV ECHO MEAS - ASC AORTA: 2.6 CM
BH CV ECHO MEAS - AVA(I,A): 2.1 CM^2
BH CV ECHO MEAS - AVA(I,D): 2.1 CM^2
BH CV ECHO MEAS - AVA(V,A): 2.4 CM^2
BH CV ECHO MEAS - AVA(V,D): 2.4 CM^2
BH CV ECHO MEAS - BSA(HAYCOCK): 1.6 M^2
BH CV ECHO MEAS - BSA: 1.6 M^2
BH CV ECHO MEAS - BZI_BMI: 23.4 KILOGRAMS/M^2
BH CV ECHO MEAS - BZI_METRIC_HEIGHT: 157.5 CM
BH CV ECHO MEAS - BZI_METRIC_WEIGHT: 58.1 KG
BH CV ECHO MEAS - CONTRAST EF 4CH: 56 CM2
BH CV ECHO MEAS - EDV(CUBED): 50.7 ML
BH CV ECHO MEAS - EDV(MOD-SP2): 56 ML
BH CV ECHO MEAS - EDV(MOD-SP4): 48 ML
BH CV ECHO MEAS - EDV(TEICH): 58.1 ML
BH CV ECHO MEAS - EF(CUBED): 84.2 %
BH CV ECHO MEAS - EF(MOD-BP): 58.5 %
BH CV ECHO MEAS - EF(MOD-SP2): 64.3 %
BH CV ECHO MEAS - EF(MOD-SP4): 54.2 %
BH CV ECHO MEAS - EF(TEICH): 78.1 %
BH CV ECHO MEAS - ESV(CUBED): 8 ML
BH CV ECHO MEAS - ESV(MOD-SP2): 20 ML
BH CV ECHO MEAS - ESV(MOD-SP4): 22 ML
BH CV ECHO MEAS - ESV(TEICH): 12.7 ML
BH CV ECHO MEAS - FS: 45.9 %
BH CV ECHO MEAS - IVS/LVPW: 1.1
BH CV ECHO MEAS - IVSD: 0.8 CM
BH CV ECHO MEAS - LAT PEAK E' VEL: 11.1 CM/SEC
BH CV ECHO MEAS - LV DIASTOLIC VOL/BSA (35-75): 30.4 ML/M^2
BH CV ECHO MEAS - LV MASS(C)D: 75.4 GRAMS
BH CV ECHO MEAS - LV MASS(C)DI: 47.7 GRAMS/M^2
BH CV ECHO MEAS - LV MAX PG: 6.2 MMHG
BH CV ECHO MEAS - LV MEAN PG: 3 MMHG
BH CV ECHO MEAS - LV SYSTOLIC VOL/BSA (12-30): 13.9 ML/M^2
BH CV ECHO MEAS - LV V1 MAX: 124 CM/SEC
BH CV ECHO MEAS - LV V1 MEAN: 79.6 CM/SEC
BH CV ECHO MEAS - LV V1 VTI: 21.6 CM
BH CV ECHO MEAS - LVIDD: 3.7 CM
BH CV ECHO MEAS - LVIDS: 2 CM
BH CV ECHO MEAS - LVLD AP2: 6.2 CM
BH CV ECHO MEAS - LVLD AP4: 6.3 CM
BH CV ECHO MEAS - LVLS AP2: 5.1 CM
BH CV ECHO MEAS - LVLS AP4: 4.7 CM
BH CV ECHO MEAS - LVOT AREA (M): 2.5 CM^2
BH CV ECHO MEAS - LVOT AREA: 2.5 CM^2
BH CV ECHO MEAS - LVOT DIAM: 1.8 CM
BH CV ECHO MEAS - LVPWD: 0.7 CM
BH CV ECHO MEAS - MED PEAK E' VEL: 9 CM/SEC
BH CV ECHO MEAS - MV A DUR: 0.11 SEC
BH CV ECHO MEAS - MV A MAX VEL: 84.4 CM/SEC
BH CV ECHO MEAS - MV DEC SLOPE: 349 CM/SEC^2
BH CV ECHO MEAS - MV DEC TIME: 200 SEC
BH CV ECHO MEAS - MV E MAX VEL: 82.3 CM/SEC
BH CV ECHO MEAS - MV E/A: 0.98
BH CV ECHO MEAS - MV MAX PG: 4.8 MMHG
BH CV ECHO MEAS - MV MEAN PG: 2 MMHG
BH CV ECHO MEAS - MV P1/2T MAX VEL: 107 CM/SEC
BH CV ECHO MEAS - MV P1/2T: 89.8 MSEC
BH CV ECHO MEAS - MV V2 MAX: 110 CM/SEC
BH CV ECHO MEAS - MV V2 MEAN: 61.5 CM/SEC
BH CV ECHO MEAS - MV V2 VTI: 32.3 CM
BH CV ECHO MEAS - MVA P1/2T LCG: 2.1 CM^2
BH CV ECHO MEAS - MVA(P1/2T): 2.4 CM^2
BH CV ECHO MEAS - MVA(VTI): 1.7 CM^2
BH CV ECHO MEAS - PA ACC TIME: 0.08 SEC
BH CV ECHO MEAS - PA MAX PG (FULL): 1.9 MMHG
BH CV ECHO MEAS - PA MAX PG: 3 MMHG
BH CV ECHO MEAS - PA PR(ACCEL): 44.4 MMHG
BH CV ECHO MEAS - PA V2 MAX: 86.9 CM/SEC
BH CV ECHO MEAS - PI END-D VEL: 65.2 CM/SEC
BH CV ECHO MEAS - PULM A REVS DUR: 0.15 SEC
BH CV ECHO MEAS - PULM A REVS VEL: 42.7 CM/SEC
BH CV ECHO MEAS - PULM DIAS VEL: 48.1 CM/SEC
BH CV ECHO MEAS - PULM S/D: 1.3
BH CV ECHO MEAS - PULM SYS VEL: 62.4 CM/SEC
BH CV ECHO MEAS - PVA(V,A): 1.5 CM^2
BH CV ECHO MEAS - PVA(V,D): 1.5 CM^2
BH CV ECHO MEAS - QP/QS: 0.43
BH CV ECHO MEAS - RAP SYSTOLE: 3 MMHG
BH CV ECHO MEAS - RV MAX PG: 1.1 MMHG
BH CV ECHO MEAS - RV MEAN PG: 1 MMHG
BH CV ECHO MEAS - RV V1 MAX: 52.2 CM/SEC
BH CV ECHO MEAS - RV V1 MEAN: 33.8 CM/SEC
BH CV ECHO MEAS - RV V1 VTI: 9.3 CM
BH CV ECHO MEAS - RVOT AREA: 2.5 CM^2
BH CV ECHO MEAS - RVOT DIAM: 1.8 CM
BH CV ECHO MEAS - RVSP: 17.9 MMHG
BH CV ECHO MEAS - SI(AO): 67.3 ML/M^2
BH CV ECHO MEAS - SI(CUBED): 27 ML/M^2
BH CV ECHO MEAS - SI(LVOT): 34.8 ML/M^2
BH CV ECHO MEAS - SI(MOD-SP2): 22.8 ML/M^2
BH CV ECHO MEAS - SI(MOD-SP4): 16.4 ML/M^2
BH CV ECHO MEAS - SI(TEICH): 28.7 ML/M^2
BH CV ECHO MEAS - SV(AO): 106.4 ML
BH CV ECHO MEAS - SV(CUBED): 42.7 ML
BH CV ECHO MEAS - SV(LVOT): 55 ML
BH CV ECHO MEAS - SV(MOD-SP2): 36 ML
BH CV ECHO MEAS - SV(MOD-SP4): 26 ML
BH CV ECHO MEAS - SV(RVOT): 23.7 ML
BH CV ECHO MEAS - SV(TEICH): 45.4 ML
BH CV ECHO MEAS - TAPSE (>1.6): 2.2 CM
BH CV ECHO MEAS - TR MAX VEL: 193 CM/SEC
BH CV ECHO MEASUREMENTS AVERAGE E/E' RATIO: 8.19
BH CV VAS BP RIGHT ARM: NORMAL MMHG
BH CV XLRA - RV BASE: 2.4 CM
BH CV XLRA - RV LENGTH: 6 CM
BH CV XLRA - RV MID: 1.6 CM
BH CV XLRA - TDI S': 12 CM/SEC
CHOLEST SERPL-MCNC: 213 MG/DL (ref 0–200)
GLUCOSE BLDC GLUCOMTR-MCNC: 113 MG/DL (ref 70–130)
HBA1C MFR BLD: 5.6 % (ref 4.8–5.6)
HDLC SERPL-MCNC: 56 MG/DL (ref 40–60)
LDLC SERPL CALC-MCNC: 137 MG/DL (ref 0–100)
LDLC/HDLC SERPL: 2.4 {RATIO}
LEFT ATRIUM VOLUME INDEX: 11 ML/M2
LV EF 2D ECHO EST: 59 %
TRIGL SERPL-MCNC: 112 MG/DL (ref 0–150)
VLDLC SERPL-MCNC: 20 MG/DL (ref 5–40)

## 2021-07-17 PROCEDURE — 70496 CT ANGIOGRAPHY HEAD: CPT

## 2021-07-17 PROCEDURE — 0 IOPAMIDOL PER 1 ML: Performed by: HOSPITALIST

## 2021-07-17 PROCEDURE — G0378 HOSPITAL OBSERVATION PER HR: HCPCS

## 2021-07-17 PROCEDURE — 93306 TTE W/DOPPLER COMPLETE: CPT

## 2021-07-17 PROCEDURE — 70551 MRI BRAIN STEM W/O DYE: CPT

## 2021-07-17 PROCEDURE — 93306 TTE W/DOPPLER COMPLETE: CPT | Performed by: INTERNAL MEDICINE

## 2021-07-17 PROCEDURE — 99214 OFFICE O/P EST MOD 30 MIN: CPT | Performed by: NURSE PRACTITIONER

## 2021-07-17 PROCEDURE — 80061 LIPID PANEL: CPT | Performed by: PSYCHIATRY & NEUROLOGY

## 2021-07-17 PROCEDURE — 25010000002 PERFLUTREN (DEFINITY) 8.476 MG IN SODIUM CHLORIDE (PF) 0.9 % 10 ML INJECTION: Performed by: PSYCHIATRY & NEUROLOGY

## 2021-07-17 PROCEDURE — 82962 GLUCOSE BLOOD TEST: CPT

## 2021-07-17 PROCEDURE — 83036 HEMOGLOBIN GLYCOSYLATED A1C: CPT | Performed by: PSYCHIATRY & NEUROLOGY

## 2021-07-17 PROCEDURE — 70498 CT ANGIOGRAPHY NECK: CPT

## 2021-07-17 RX ORDER — ASPIRIN 81 MG/1
81 TABLET, CHEWABLE ORAL DAILY
Start: 2021-07-18 | End: 2022-05-10

## 2021-07-17 RX ADMIN — IOPAMIDOL 95 ML: 755 INJECTION, SOLUTION INTRAVENOUS at 13:10

## 2021-07-17 RX ADMIN — ASPIRIN 81 MG: 81 TABLET, CHEWABLE ORAL at 09:09

## 2021-07-17 RX ADMIN — PERFLUTREN 2 ML: 6.52 INJECTION, SUSPENSION INTRAVENOUS at 11:40

## 2021-07-17 RX ADMIN — SODIUM CHLORIDE, PRESERVATIVE FREE 10 ML: 5 INJECTION INTRAVENOUS at 09:09

## 2021-07-17 NOTE — PROGRESS NOTES
"DOS: 2021  NAME: Aleena Wilder   : 1970  PCP: Felix Quan MD  Chief Complaint   Patient presents with   • Numbness     CC: Stroke    Subjective: No new events overnight per RN. Patient sitting up in bed, currently denies headache or any new stroke/TIA symptoms.  Does report some left hand intermittent tingling, no facial involvement.  States history of carpal tunnel.  She has baseline lower right facial droop from previous dental surgery.  No family bedside currently.  She is anxious for discharge.  Awaiting CTA, MRI.  Patient and problem are new to examiner. History is provided by patient and review of records that are summarized below.       Interval History  History taken from: patient chart RN    Objective:  Vital signs: /68 (BP Location: Left arm, Patient Position: Lying)   Pulse 69   Temp 98.2 °F (36.8 °C) (Oral)   Resp 16   Ht 157.5 cm (62\")   Wt 58.2 kg (128 lb 4.9 oz)   SpO2 97%   BMI 23.47 kg/m²       Physical Exam:  GENERAL: NAD  HEENT: Normocephalic, atraumatic   COR: RRR  Resp: Even and unlabored  Extremities: No signs of distal embolization. TEDs and SCDs in place.    Neurological:   MS: AO. Language normal. No neglect. Higher integrative function normal  CN: II-XII normal  Motor: Normal strength and tone throughout.  Sensory: Intact to light touch in arms and legs, subjective reports of intermittent left hand tingling  Station and Gait: Normal  Coordination: Normal    Current Medications:  Scheduled Medications:aspirin, 81 mg, Oral, Daily   Or  aspirin, 300 mg, Rectal, Daily  atorvastatin, 80 mg, Oral, Nightly  sodium chloride, 10 mL, Intravenous, Q12H  sodium chloride, 10 mL, Intravenous, Q12H      Infusions:    PRN Medications:  nitroglycerin  •  sodium chloride  •  sodium chloride  •  sodium chloride    Medications Reviewed:    Laboratory results:  Lab Results   Component Value Date    GLUCOSE 105 (H) 2021    CALCIUM 9.2 2021     2021    K " 3.7 07/16/2021    CO2 26.4 07/16/2021     07/16/2021    BUN 11 07/16/2021    CREATININE 0.72 07/16/2021    EGFRIFAFRI 101 01/21/2021    EGFRIFNONA 86 07/16/2021    BCR 15.3 07/16/2021    ANIONGAP 9.6 07/16/2021     Lab Results   Component Value Date    WBC 5.56 07/16/2021    HGB 13.6 07/16/2021    HCT 40.4 07/16/2021    MCV 88.4 07/16/2021     07/16/2021      Results from last 7 days   Lab Units 07/17/21  0632   CHOLESTEROL mg/dL 213*     Lab Results   Component Value Date    INR 0.96 07/16/2021    PROTIME 12.6 07/16/2021     Lab Results   Component Value Date    TSH 0.839 01/08/2019     Lab Results   Component Value Date    HGBA1C 5.60 07/17/2021     Lab Results   Component Value Date    CHOL 213 (H) 07/17/2021    CHLPL 247 (H) 01/21/2021    TRIG 112 07/17/2021    HDL 56 07/17/2021     (H) 07/17/2021      No results found for: [id number]    Results Review:     I reviewed the patient's new clinical results.  I reviewed the patient's new imaging results and agree with the interpretation.  CT head 7/16/2021:FINDINGS:    The ventricles and sulci are within normal limits.  No acute  intracranial hemorrhage or pathologic extra-axial collection is  identified.  There is no midline shift or mass effect.  The basal  cisterns are patent.  The grey-white matter differentiation is  maintained. There is calcific intracranial atherosclerosis.  IMPRESSION:  No acute intracranial abnormality. If there is clinical concern for  acute infarction, this would be better assessed with MRI.   CXR 7/16/2021: Negative  EKG 7/16/2021: SR, short [place] interval    Impression: Ms. Wilder is a 51 yo RHW female with h/o migraine with aura that essentially resolved following a hysterectomy a number of years ago who presented on 7/15 with sudden onset left shoulder pain, arm numbness and weakness followed by tingling of left face and leg, associated mild headache and cognitive slowing.  Symptoms essentially resolved except for  "on exam today some intermittent left hand tingling, no weakness, exam nonfocal.     Dkhh-vs-jg-date:  -CT head: Negative for any acute findings  -Labs: CMP, CBC unremarkable cholesterol total of 213, HDL 56, , ; hemoglobin A1c 5.6  -TTE, CTA head, MRI: Pending    Diagnoses:   Transient left-sided numbness, weakness --Suspect migraine with sensory aura vs.TIA  History of migraine with aura    Plan:  Clinically, patient remains asymptomatic, received migraine cocktail yday, no headache today, she is anxious for discharge. D/W RN and per radiology patient is \"top of list\" for pending scans  CTA head/neck pending to rule out dissection  MRI brain pending to evaluate for stroke or any secondary cause of headache  Aspirin 325mg  Lipitor 80 mg ()  Hydrate  Neurochecks  TEDs/SCDs  EKG Tele  PT/OT/ST  Stroke Education    ADDENDUM: Patient had CTA head/neck and awaiting MRI brain. If CTA head/neck negative and patient is unable to get her MRI today, okay to D/C from neuro standpoint and complete work-up outpatient, discussed with RN    I have discussed the above with patient, RN, and Dr. Todd who agrees with plan.   Natalie Hodge, APRN  07/17/21  09:06 EDT        Stroke-like symptoms    "

## 2021-07-17 NOTE — OUTREACH NOTE
Prep Survey      Responses   [facility] patient discharged from?  [facility]   Is LACE score < 7 ?  Yes   Emergency Room discharge w/ pulse ox?  No   Eligibility  [facility]   Date of Admission  07/16/21   Date of Discharge  07/17/21   Discharge Disposition  Home or Self Care   Discharge diagnosis  Stroke-like symptoms   Does the patient have one of the following disease processes/diagnoses(primary or secondary)?  Other   Does the patient have Home health ordered?  No   Is there a DME ordered?  No   Prep survey completed?  Yes          Amy Pretty RN

## 2021-07-17 NOTE — CONSULTS
Acute rehab referral received via stroke order set. Patient with NIHSS 0. Will sign off.     Thank you!    Adarsh Estrada RN  p    As per plan above for Problem #2

## 2021-07-17 NOTE — PLAN OF CARE
Goal Outcome Evaluation:  Plan of Care Reviewed With: patient        Progress: improving  Outcome Summary: Denies any numbness or tingling NIH score 0, MRI planned today, holter monitor to be placed after MRI. Slept quietly with no s/s of distress, normal sinus on the monitor.

## 2021-07-17 NOTE — PLAN OF CARE
Problem: Adult Inpatient Plan of Care  Goal: Plan of Care Review  Outcome: Ongoing, Progressing  Flowsheets (Taken 7/17/2021 1607)  Outcome Summary: B   Goal Outcome Evaluation:              Outcome Summary: Bedside evaluation not performed.  RN refused stating pt passed the screen and is tolerating a regular diet with thin liquids.

## 2021-07-17 NOTE — DISCHARGE SUMMARY
PHYSICIAN DISCHARGE SUMMARY                                                                        [facility]    Patient Identification:  Name: Aleena Wilder  Age: 50 y.o.  Sex: female  :  1970  MRN: [id number]  Primary Care Physician: Felix Quan MD    Admit date: 2021  Discharge date and time:2021  Discharged Condition: good    Discharge Diagnoses:  Active Hospital Problems    Diagnosis  POA   • **Stroke-like symptoms [R29.90]  Yes   • Nonintractable migraine [G43.009]  Unknown   • Mild intermittent asthma without complication [J45.20]  Yes   • Allergic rhinitis [J30.9]  Yes   • Eczema [L30.9]  Yes      Resolved Hospital Problems   No resolved problems to display.          PMHX:   Past Medical History:   Diagnosis Date   • Anxiety    • Endometriosis    • History of panic attacks    • Kidney stones    • Renal cyst    • Sleep apnea     uses CPAP     PSHX:   Past Surgical History:   Procedure Laterality Date   • APPENDECTOMY N/A 2006   • CYSTOSCOPY, RETROGRADE PYELOGRAM AND STENT INSERTION Right 2020    Procedure: CYSTO, STENT PLACEMENT, RIGHT RETROGRADE PYELOGRAM;  Surgeon: Nii Ca MD;  Location: [facility] OR [facility];  Service: Urology;  Laterality: Right;   • DIAGNOSTIC LAPAROSCOPY N/A    • TOTAL ABDOMINAL HYSTERECTOMY WITH SALPINGO OOPHORECTOMY Bilateral 2006       Hospital Course: Aleena Wilder  is a 50 y.o. female presents with onset of aching in the left supraclavicular area about 36 hours prior to admission followed by numbness in the left arm including the middle and index fingers.  She awoke on the morning of admission with the development of numbness in the left face and lower extremity and a dull left-sided headache.  There is some vague weakness in the left arm and leg.  She had a history of migraines up until around the age of 37 at which time she had a total abdominal  hysterectomy and bilateral oophorectomy for advanced endometriosis.  Rare migraines since then.  There is some history of being an epilepsy suspect in grade school at which time she had fainting spells.  This was never treated however.  She does have a history of chronic anxiety.  Chronic allergies.  Otherwise she has been very healthy.  She does have impaired glucose tolerance.  No history of hypertension.         The patient was admitted to the hospital and seen by neurology.  Patient had stroke work-up that was negative.  Neurology felt the patient likely had migraine syndrome with some complications with neurologic symptoms.  She was feeling better and getting back to normal and felt well enough to go home after being in the hospital for day.  She will follow-up with her primary care doctor in 1 week for continuing care.  She will take baby aspirin 81 mg daily.  Her cholesterol was a little high but she really did not want to take cholesterol medicine at this time she is going to try the diet little harder and follow-up with her primary care in regard to whether she needs to start cholesterol medicine.    Consults:     Consults     Date and Time Order Name Status Description    7/16/2021  3:15 PM Inpatient Neurology Consult Stroke Completed     7/16/2021  1:00 PM Inpatient Neurology Consult Stroke Completed     7/16/2021 10:06 AM IP General Consult (Use specialty-specific consult if known) Completed     7/16/2021  8:53 AM Inpatient Neurology Consult Stroke Completed         Results from last 7 days   Lab Units 07/16/21  0903   WBC 10*3/mm3 5.56   HEMOGLOBIN g/dL 13.6   HEMATOCRIT % 40.4   PLATELETS 10*3/mm3 234     Results from last 7 days   Lab Units 07/16/21  0903   SODIUM mmol/L 141   POTASSIUM mmol/L 3.7   CHLORIDE mmol/L 105   CO2 mmol/L 26.4   BUN mg/dL 11   CREATININE mg/dL 0.72   GLUCOSE mg/dL 105*   CALCIUM mg/dL 9.2     Significant Diagnostic Studies:   WBC   Date Value Ref Range Status   07/16/2021  5.56 3.40 - 10.80 10*3/mm3 Final     Hemoglobin   Date Value Ref Range Status   07/16/2021 13.6 12.0 - 15.9 g/dL Final     Hematocrit   Date Value Ref Range Status   07/16/2021 40.4 34.0 - 46.6 % Final     Platelets   Date Value Ref Range Status   07/16/2021 234 140 - 450 10*3/mm3 Final     Sodium   Date Value Ref Range Status   07/16/2021 141 136 - 145 mmol/L Final     Potassium   Date Value Ref Range Status   07/16/2021 3.7 3.5 - 5.2 mmol/L Final     Chloride   Date Value Ref Range Status   07/16/2021 105 98 - 107 mmol/L Final     CO2   Date Value Ref Range Status   07/16/2021 26.4 22.0 - 29.0 mmol/L Final     BUN   Date Value Ref Range Status   07/16/2021 11 6 - 20 mg/dL Final     Creatinine   Date Value Ref Range Status   07/16/2021 0.72 0.57 - 1.00 mg/dL Final     Glucose   Date Value Ref Range Status   07/16/2021 105 (H) 65 - 99 mg/dL Final     Calcium   Date Value Ref Range Status   07/16/2021 9.2 8.6 - 10.5 mg/dL Final     AST (SGOT)   Date Value Ref Range Status   07/16/2021 13 1 - 32 U/L Final     ALT (SGPT)   Date Value Ref Range Status   07/16/2021 14 1 - 33 U/L Final     Alkaline Phosphatase   Date Value Ref Range Status   07/16/2021 77 39 - 117 U/L Final     INR   Date Value Ref Range Status   07/16/2021 0.96 0.90 - 1.10 Final     No results found for: COLORU, CLARITYU, SPECGRAV, PHUR, PROTEINUR, GLUCOSEU, KETONESU, BLOODU, NITRITE, LEUKOCYTESUR, BILIRUBINUR, UROBILINOGEN, RBCUA, WBCUA, BACTERIA, UACOMMENT  No results found for: TROPONINT, TROPONINI, BNP  No components found for: HGBA1C;2  No components found for: TSH;2  Imaging Results (All)     Procedure Component Value Units Date/Time    MRI Brain Without Contrast [[id number]] Resulted: 07/17/21 1405     Updated: 07/17/21 1429    CT Angiogram Neck [[id number]] Resulted: 07/17/21 1552     Updated: 07/17/21 1312    CT Angiogram Head w AI Analysis of LVO [[id number]] Resulted: 07/17/21 1308     Updated: 07/17/21 1312    XR Chest 1 View [[id number]]  Collected: 07/16/21 0953     Updated: 07/16/21 1000    Narrative:      CHEST SINGLE VIEW     HISTORY: Stroke protocol. Chest pain with left arm numbness.     COMPARISON: Two-view chest 10/20/2017.     FINDINGS: Cardiomediastinal silhouette is within normal limits. Lungs  appear clear and there is no evidence of pulmonary edema or pleural  effusion or infiltrate or change when compared to the previous exam.             Impression:      No acute disease.     This report was finalized on 7/16/2021 9:57 AM by Dr. Vitaly Nagel M.D.       CT Head Without Contrast Stroke Protocol [[id number]] Collected: 07/16/21 0946     Updated: 07/16/21 0949    Narrative:      EXAM:  CT HEAD WO CONTRAST STROKE PROTOCOL-     HISTORY:  Left-sided numbness began yesterday morning, now with  left-sided weakness that began today.     COMPARISON:  CT head without contrast 07/17/2006.     TECHNIQUE: Noncontrast images of the brain were obtained. Reformatted  images were reviewed. Radiation dose reduction techniques were utilized,  including automated exposure control and exposure modulation based on  body size.     FINDINGS:       The ventricles and sulci are within normal limits.  No acute  intracranial hemorrhage or pathologic extra-axial collection is  identified.  There is no midline shift or mass effect.  The basal  cisterns are patent.  The grey-white matter differentiation is  maintained. There is calcific intracranial atherosclerosis.          Impression:      No acute intracranial abnormality. If there is clinical concern for  acute infarction, this would be better assessed with MRI.      Discussed with Dr. Samuels at 9:46 AM.     This report was finalized on 7/16/2021 9:46 AM by Dr. Nahed Ferraro M.D.           Lab Results (last 7 days)     Procedure Component Value Units Date/Time    Lipid Panel [[id number]]  (Abnormal) Collected: 07/17/21 0632    Specimen: Blood Updated: 07/17/21 0804     Total Cholesterol 213 mg/dL       Triglycerides 112 mg/dL      HDL Cholesterol 56 mg/dL      LDL Cholesterol  137 mg/dL      VLDL Cholesterol 20 mg/dL      LDL/HDL Ratio 2.40    Narrative:      Cholesterol Reference Ranges  (U.S. Department of Health and Human Services ATP III Classifications)    Desirable          <200 mg/dL  Borderline High    200-239 mg/dL  High Risk          >240 mg/dL      Triglyceride Reference Ranges  (U.S. Department of Health and Human Services ATP III Classifications)    Normal           <150 mg/dL  Borderline High  150-199 mg/dL  High             200-499 mg/dL  Very High        >500 mg/dL    HDL Reference Ranges  (U.S. Department of Health and Human Services ATP III Classifcations)    Low     <40 mg/dl (major risk factor for CHD)  High    >60 mg/dl ('negative' risk factor for CHD)        LDL Reference Ranges  (U.S. Department of Health and Human Services ATP III Classifcations)    Optimal          <100 mg/dL  Near Optimal     100-129 mg/dL  Borderline High  130-159 mg/dL  High             160-189 mg/dL  Very High        >189 mg/dL    Hemoglobin A1c [[id number]]  (Normal) Collected: 07/17/21 0632    Specimen: Blood Updated: 07/17/21 0741     Hemoglobin A1C 5.60 %     Narrative:      Hemoglobin A1C Ranges:    Increased Risk for Diabetes  5.7% to 6.4%  Diabetes                     >= 6.5%  Diabetic Goal                < 7.0%    POC Glucose Once [[id number]]  (Normal) Collected: 07/17/21 0612    Specimen: Blood Updated: 07/17/21 0614     Glucose 113 mg/dL      Comment: Meter: [id number] : [id number] Ara MIRANDA       POC Glucose Once [[id number]]  (Normal) Collected: 07/16/21 2134    Specimen: Blood Updated: 07/16/21 2136     Glucose 126 mg/dL      Comment: Meter: [id number] : [id number] Ara Gale NA       POC Glucose Once [[id number]]  (Abnormal) Collected: 07/16/21 1739    Specimen: Blood Updated: 07/16/21 1740     Glucose 226 mg/dL      Comment: Meter: [id number] : [id number] Armen NUNEZ  PRE-OP / PRE-PROCEDURE SCREENING ORDER (NO ISOLATION) - Swab, Nasopharynx [[id number]]  (Normal) Collected: 07/16/21 0929    Specimen: Swab from Nasopharynx Updated: 07/16/21 1238    Narrative:      The following orders were created for panel order COVID PRE-OP / PRE-PROCEDURE SCREENING ORDER (NO ISOLATION) - Swab, Nasopharynx.  Procedure                               Abnormality         Status                     ---------                               -----------         ------                     COVID-19,[facility] IN-HOUSE...[[id number]]  Normal              Final result                 Please view results for these tests on the individual orders.    COVID-19,[facility] IN-HOUSE CEPHEID/SUZANNE NP SWAB IN TRANSPORT MEDIA 8-12 HR TAT - Swab, Nasopharynx [[id number]]  (Normal) Collected: 07/16/21 0929    Specimen: Swab from Nasopharynx Updated: 07/16/21 1238     COVID19 Not Detected    Narrative:      Fact sheet for providers: https://www.fda.gov/media/840286/download     Fact sheet for patients: https://www.fda.gov/media/085006/download    [place] Draw [[id number]] Collected: 07/16/21 0903    Specimen: Blood Updated: 07/16/21 1015    Narrative:      The following orders were created for panel order [place] Draw.  Procedure                               Abnormality         Status                     ---------                               -----------         ------                     Green Top (Gel)[[id number]]                                  Final result               Lavender Top[[id number]]                                     Final result               Gold Top - SST[[id number]]                                   Final result                 Please view results for these tests on the individual orders.    Green Top (Gel) [[id number]] Collected: 07/16/21 0903    Specimen: Blood Updated: 07/16/21 1015     Extra Tube Hold for add-ons.     Comment: Auto resulted.       Lavender Top [[id number]] Collected: 07/16/21 0903    Specimen:  Blood Updated: 07/16/21 1015     Extra Tube hold for add-on     Comment: Auto resulted       Gold Top - SST [[id number]] Collected: 07/16/21 0903    Specimen: Blood Updated: 07/16/21 1015     Extra Tube Hold for add-ons.     Comment: Auto resulted.       Protime-INR [[id number]]  (Normal) Collected: 07/16/21 0903    Specimen: Blood Updated: 07/16/21 0939     Protime 12.6 Seconds      INR 0.96    Comprehensive Metabolic Panel [[id number]]  (Abnormal) Collected: 07/16/21 0903    Specimen: Blood Updated: 07/16/21 0937     Glucose 105 mg/dL      BUN 11 mg/dL      Creatinine 0.72 mg/dL      Sodium 141 mmol/L      Potassium 3.7 mmol/L      Chloride 105 mmol/L      CO2 26.4 mmol/L      Calcium 9.2 mg/dL      Total Protein 6.9 g/dL      Albumin 4.40 g/dL      ALT (SGPT) 14 U/L      AST (SGOT) 13 U/L      Alkaline Phosphatase 77 U/L      Total Bilirubin 0.5 mg/dL      eGFR Non African Amer 86 mL/min/1.73      Globulin 2.5 gm/dL      A/G Ratio 1.8 g/dL      BUN/Creatinine Ratio 15.3     Anion Gap 9.6 mmol/L     Narrative:      GFR Normal >60  Chronic Kidney Disease <60  Kidney Failure <15      CBC & Differential [[id number]]  (Normal) Collected: 07/16/21 0903    Specimen: Blood Updated: 07/16/21 0926    Narrative:      The following orders were created for panel order CBC & Differential.  Procedure                               Abnormality         Status                     ---------                               -----------         ------                     CBC Auto Differential[[id number]]        Normal              Final result                 Please view results for these tests on the individual orders.    CBC Auto Differential [[id number]]  (Normal) Collected: 07/16/21 0903    Specimen: Blood Updated: 07/16/21 0926     WBC 5.56 10*3/mm3      RBC 4.57 10*6/mm3      Hemoglobin 13.6 g/dL      Hematocrit 40.4 %      MCV 88.4 fL      MCH 29.8 pg      MCHC 33.7 g/dL      RDW 12.5 %      RDW-SD 40.7 fl      MPV 10.6 fL       "Platelets 234 10*3/mm3      Neutrophil % 49.0 %      Lymphocyte % 42.1 %      Monocyte % 5.6 %      Eosinophil % 1.8 %      Basophil % 1.3 %      Immature Grans % 0.2 %      Neutrophils, Absolute 2.73 10*3/mm3      Lymphocytes, Absolute 2.34 10*3/mm3      Monocytes, Absolute 0.31 10*3/mm3      Eosinophils, Absolute 0.10 10*3/mm3      Basophils, Absolute 0.07 10*3/mm3      Immature Grans, Absolute 0.01 10*3/mm3      nRBC 0.0 /100 WBC     POC Glucose Once [[id number]]  (Normal) Collected: 07/16/21 0908    Specimen: Blood Updated: 07/16/21 0909     Glucose 103 mg/dL      Comment: Meter: [id number] : [id number] Leonor Belle PCA           /77 (BP Location: Left arm, Patient Position: Sitting)   Pulse 85   Temp 98.9 °F (37.2 °C) (Oral)   Resp 16   Ht 157.5 cm (62\")   Wt 58.1 kg (128 lb)   SpO2 97%   BMI 23.41 kg/m²     Discharge Exam:  General Appearance:    Alert, cooperative, no distress                          Head:    Normocephalic, without obvious abnormality, atraumatic                          Eyes:                            Throat:   Lips, tongue, gums normal                          Neck:   Supple, symmetrical, trachea midline, no JVD                        Lungs:     Clear to auscultation bilaterally, respirations unlabored                Chest Wall:    No tenderness or deformity                        Heart:    Regular rate and rhythm, S1 and S2 normal, no murmur,no  Rub or gallop                  Abdomen:     Soft, non-tender, bowel sounds active, no masses, no organomegaly                  Extremities:   Extremities normal, atraumatic, no cyanosis or edema                             Skin:   Skin is warm and dry,  no rashes or palpable lesions                  Neurologic:   no focal deficits noted     Disposition:  Home    Patient Instructions:      Discharge Medications      New Medications      Instructions Start Date   aspirin 81 MG chewable tablet   81 mg, Oral, Daily   Start Date: July " 18, 2021        Continue These Medications      Instructions Start Date   ALLERGY SERUM INJECTION   1 mL, Subcutaneous, Weekly      fexofenadine 180 MG tablet  Commonly known as: ALLEGRA   180 mg, Oral, Daily      fluticasone 50 MCG/ACT nasal spray  Commonly known as: FLONASE   2 sprays, Nasal, Daily      multivitamin tablet tablet  Commonly known as: THERAGRAN   1 tablet, Oral, Daily      Vitamin D3 50 MCG (2000 UT) tablet   2,000 Units, Oral, Daily           Future Appointments   Date Time Provider Department Center   1/25/2022  9:30 AM Felix Quan MD [id number]   7/12/2022 10:15 AM Dewayne Angela MD [facility] None     Follow-up Information     Felix Quan MD Follow up in 1 week(s).    Specialty: Internal Medicine  Contact information:  [address] [place] [address]  [phone]                 Discharge Order (From admission, onward)     Start     Ordered    07/17/21 1600  Discharge patient  Once     Expected Discharge Date: 07/17/21    Discharge Disposition: Home or Self Care    Physician of Record for Attribution - Please select from Treatment Team: NAHEED AZAR [[id number]]    Review needed by CMO to determine Physician of Record: No       Question Answer Comment   Physician of Record for Attribution - Please select from Treatment Team NAHEED AZAR    Review needed by CMO to determine Physician of Record No        07/17/21 1600                Total time spent discharging patient including evaluation,post hospitalization follow up,  medication and post hospitalization instructions and education total time exceeds 30 minutes.    Signed:  Naheed Azar MD  7/17/2021  16:00 EDT

## 2021-07-17 NOTE — SIGNIFICANT NOTE
07/17/21 1618   OTHER   Discipline physical therapist   Rehab Time/Intention   Session Not Performed other (see comments)  (Pt with no acute PT needs and plans to d/c home today, per RN Gaby. PT will sign off.)

## 2021-07-19 ENCOUNTER — TRANSITIONAL CARE MANAGEMENT TELEPHONE ENCOUNTER (OUTPATIENT)
Dept: CALL CENTER | Facility: HOSPITAL | Age: 51
End: 2021-07-19

## 2021-07-19 NOTE — CASE MANAGEMENT/SOCIAL WORK
Case Management Discharge Note      Final Note: Home no additional dc orders ntoed for CCP. Nico BRANDON/CCP         Selected Continued Care - Discharged on 7/17/2021 Admission date: 7/16/2021 - Discharge disposition: Home or Self Care    Destination    No services have been selected for the patient.              Durable Medical Equipment    No services have been selected for the patient.              Dialysis/Infusion    No services have been selected for the patient.              Home Medical Care    No services have been selected for the patient.              Therapy    No services have been selected for the patient.              Community Resources    No services have been selected for the patient.              Community & DME    No services have been selected for the patient.                  Transportation Services  Private: Car    Final Discharge Disposition Code: 01 - home or self-care

## 2021-07-19 NOTE — OUTREACH NOTE
Call Center TCM Note      Responses   [facility] patient discharged from?  [place]   Does the patient have one of the following disease processes/diagnoses(primary or secondary)?  Other   TCM attempt successful?  Yes [Luisito, spouse]   Call start time  [phone]   Call end time  1041   Discharge diagnosis  Stroke-like symptoms   Person spoke with today (if not patient) and relationship  Patient   Meds reviewed with patient/caregiver?  Yes   Is the patient having any side effects they believe may be caused by any medication additions or changes?  No   Does the patient have all medications ordered at discharge?  Yes   Is the patient taking all medications as directed (includes completed medication regime)?  Yes   Does the patient have a primary care provider?   Yes   Does the patient have an appointment with their PCP within 7 days of discharge?  No   What is preventing the patient from scheduling follow up appointments within 7 days of discharge?  Haven't had time   Nursing Interventions  Educated patient on importance of making appointment, Advised patient to make appointment   Has the patient kept scheduled appointments due by today?  N/A   Psychosocial issues?  No   Did the patient receive a copy of their discharge instructions?  Yes   Nursing interventions  Reviewed instructions with patient   What is the patient's perception of their health status since discharge?  Improving   Is the patient/caregiver able to teach back signs and symptoms related to disease process for when to call PCP?  Yes   Is the patient/caregiver able to teach back signs and symptoms related to disease process for when to call 911?  Yes   Is the patient/caregiver able to teach back the hierarchy of who to call/visit for symptoms/problems? PCP, Specialist, Home health nurse, Urgent Care, ED, 911  Yes   If the patient is a current smoker, are they able to teach back resources for cessation?  Not a smoker   TCM call completed?  Yes   Wrap  up additional comments  Pt states she is feeling better. Pt will call office and make a PCP hospital [facility] appt since she is going out of town. No questions/concerns.          Shari Rico RN    7/19/2021, 10:42 EDT

## 2021-07-21 ENCOUNTER — OFFICE VISIT (OUTPATIENT)
Dept: INTERNAL MEDICINE | Facility: CLINIC | Age: 51
End: 2021-07-21

## 2021-07-21 VITALS
SYSTOLIC BLOOD PRESSURE: 106 MMHG | BODY MASS INDEX: 22.63 KG/M2 | WEIGHT: 123 LBS | HEIGHT: 62 IN | DIASTOLIC BLOOD PRESSURE: 70 MMHG | RESPIRATION RATE: 18 BRPM | TEMPERATURE: 97.6 F | HEART RATE: 84 BPM | OXYGEN SATURATION: 99 %

## 2021-07-21 DIAGNOSIS — G43.409 HEMIPLEGIC MIGRAINE WITHOUT STATUS MIGRAINOSUS, NOT INTRACTABLE: Primary | ICD-10-CM

## 2021-07-21 PROBLEM — R29.90 STROKE-LIKE SYMPTOMS: Status: RESOLVED | Noted: 2021-07-16 | Resolved: 2021-07-21

## 2021-07-21 PROCEDURE — 99214 OFFICE O/P EST MOD 30 MIN: CPT | Performed by: INTERNAL MEDICINE

## 2021-07-21 NOTE — PROGRESS NOTES
{SnapShot  Notes  Encounters  Result Review  Labs  Imaging  [facility]  Care Everywhere :33}       Subjective   Aleena Wilder is a 51 y.o. female.     Chief Complaint   Patient presents with   • Hospital Follow Up Visit     Migraines, Ruled out Stroke          The patient was admitted to the hospital and seen by neurology.  Patient had stroke work-up that was negative.  Neurology felt the patient likely had migraine syndrome with some complications with neurologic symptoms.  She was feeling better and getting back to normal and felt well enough to go home after being in the hospital for day.  She will follow-up with her primary care doctor in 1 week for continuing care.  She will take baby aspirin 81 mg daily.  Her cholesterol was a little high but she really did not want to take cholesterol medicine at this time she is going to try the diet little harder and follow-up with her primary care in regard to whether she needs to start cholesterol medicine.       The following portions of the patient's history were reviewed and updated as appropriate: allergies, current medications, past social history and problem list.    Outpatient Medications Marked as Taking for the 7/21/21 encounter (Office Visit) with Fleix Quan MD   Medication Sig Dispense Refill   • ALLERGY SERUM INJECTION Inject 1 mL under the skin into the appropriate area as directed 1 (One) Time Per Week.     • aspirin 81 MG chewable tablet Chew 1 tablet Daily.     • Cholecalciferol (Vitamin D3) 50 MCG (2000 UT) tablet Take 2,000 Units by mouth Daily.     • fexofenadine (ALLEGRA) 180 MG tablet Take 180 mg by mouth Daily.     • fluticasone (FLONASE) 50 MCG/ACT nasal spray 2 sprays into each nostril Daily.     • Multiple Vitamins-Minerals (MULTIVITAMIN PO) Take 1 tablet by mouth Daily.         Review of Systems   Neurological: Negative for speech difficulty, weakness, numbness and headaches.       Objective   Vitals:    07/21/21 0843   BP:  106/70   Pulse: 84   Resp: 18   Temp: 97.6 °F (36.4 °C)   SpO2: 99%      Wt Readings from Last 3 Encounters:   07/21/21 55.8 kg (123 lb)   07/17/21 58.1 kg (128 lb)   07/13/21 54 kg (119 lb)    Body mass index is 22.5 kg/m².      Physical Exam  Constitutional:       Appearance: Normal appearance.   Neurological:      General: No focal deficit present.      Mental Status: She is alert and oriented to person, place, and time.      Cranial Nerves: No cranial nerve deficit.      Motor: No weakness.      Coordination: Coordination normal.      Gait: Gait normal.      Deep Tendon Reflexes: Reflexes normal.           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  Patient had stroke work-up that was negative.  Neurology felt the patient likely had migraine syndrome with some complications with neurologic symptoms.  She was feeling better and getting back to normal and felt well enough to go home after being in the hospital for day.  She will follow-up with her primary care doctor in 1 week for continuing care.  She will take baby aspirin 81 mg daily.  After 3 months we can probably discontinue the aspirin.  She has had rare headaches since age 37 when she had a complete hysterectomy.  We will check an EEG since her was a childhood history of being an epileptic suspect although it was never treated.    The above information was reviewed again today 07/21/21.  It continues to be accurate as reflected above and is unchanged.  History, physical and review of systems all reviewed and are unchanged.  Medications were reviewed today and continue the current dosing.    PPE today includes face mask and eye shield.    The  10-year ASCVD risk score (Theodora RESENDIZ Jr., et al., 2013) is: 1%    Values used to calculate the score:      Age: 51 years      Sex: Female      Is Non- : No      Diabetic: No      Tobacco smoker: No      Systolic Blood Pressure: 106 mmHg      Is BP treated: No      HDL Cholesterol: 56 mg/dL      Total Cholesterol: 213 mg/dL               Jarad disclaimer:   Much of this encounter note is an electronic transcription/translation of spoken language to printed text. The electronic translation of spoken language may permit erroneous, or at times, nonsensical words or phrases to be inadvertently transcribed; Although I have reviewed the note for such errors, some may still exist.

## 2021-08-01 DIAGNOSIS — Z83.71 FAMILY HISTORY OF POLYPS IN THE COLON: Primary | ICD-10-CM

## 2021-08-01 RX ORDER — SODIUM CHLORIDE, SODIUM LACTATE, POTASSIUM CHLORIDE, CALCIUM CHLORIDE 600; 310; 30; 20 MG/100ML; MG/100ML; MG/100ML; MG/100ML
30 INJECTION, SOLUTION INTRAVENOUS CONTINUOUS
Status: CANCELLED | OUTPATIENT
Start: 2021-11-03

## 2021-08-04 ENCOUNTER — APPOINTMENT (OUTPATIENT)
Dept: NEUROLOGY | Facility: HOSPITAL | Age: 51
End: 2021-08-04

## 2021-08-05 ENCOUNTER — TELEPHONE (OUTPATIENT)
Dept: GASTROENTEROLOGY | Facility: CLINIC | Age: 51
End: 2021-08-05

## 2021-08-05 PROBLEM — Z83.71 FAMILY HISTORY OF POLYPS IN THE COLON: Status: ACTIVE | Noted: 2021-08-05

## 2021-08-05 PROBLEM — Z83.719 FAMILY HISTORY OF POLYPS IN THE COLON: Status: ACTIVE | Noted: 2021-08-05

## 2021-08-06 ENCOUNTER — HOSPITAL ENCOUNTER (OUTPATIENT)
Dept: NEUROLOGY | Facility: HOSPITAL | Age: 51
Discharge: HOME | End: 2021-08-06
Admitting: INTERNAL MEDICINE

## 2021-08-06 DIAGNOSIS — G43.409 HEMIPLEGIC MIGRAINE WITHOUT STATUS MIGRAINOSUS, NOT INTRACTABLE: ICD-10-CM

## 2021-08-06 PROCEDURE — 95816 EEG AWAKE AND DROWSY: CPT

## 2021-08-06 PROCEDURE — 95816 EEG AWAKE AND DROWSY: CPT | Performed by: PSYCHIATRY & NEUROLOGY

## 2021-08-09 ENCOUNTER — TELEPHONE (OUTPATIENT)
Dept: INTERNAL MEDICINE | Facility: CLINIC | Age: 51
End: 2021-08-09

## 2021-08-09 NOTE — TELEPHONE ENCOUNTER
Pt states she is unable to drive due to taking Hydrocodone, so she is waiting for  to take her later today.

## 2021-08-09 NOTE — TELEPHONE ENCOUNTER
Caller: Aleena Wilder    Relationship to patient: Self    Best call back number: [phone]    Chief complaint: PATIENT CALLED STATING SHE WAS HAVING A CHEST PAIN AND PATIENT WAS UNSURE WHAT WAS CAUSING IT BUT SHE WAS IN A LOT OF PAIN. PATIENT STATED SHE THOUGHT SHE MIGHT HAVE PULLED A MUSCLE OR MAY HAVE CRACK A RIB BUT WAS UNSURE. I INFORMED PATIENT THAT DUE TO SYMPTOMS SHE SHOULD GO TO THE ER. PATIENT VERBALIZED UNDERSTANDING AND WILL HAVE HER  DRIVE HER.     Patient directed to call 911 or go to their nearest emergency room.     Patient verbalized understanding: [x] Yes  [] No

## 2021-10-20 ENCOUNTER — TELEPHONE (OUTPATIENT)
Dept: GASTROENTEROLOGY | Facility: CLINIC | Age: 51
End: 2021-10-20

## 2021-10-20 NOTE — TELEPHONE ENCOUNTER
spoke with pt rsw from 11-3 to edison 10 arrive at 130 pm rsw--andra-----pt will change her existing paper work

## 2022-01-01 ENCOUNTER — APPOINTMENT (OUTPATIENT)
Dept: CT IMAGING | Facility: HOSPITAL | Age: 52
End: 2022-01-01

## 2022-01-01 ENCOUNTER — HOSPITAL ENCOUNTER (EMERGENCY)
Facility: HOSPITAL | Age: 52
Discharge: HOME | End: 2022-01-01
Attending: EMERGENCY MEDICINE | Admitting: EMERGENCY MEDICINE

## 2022-01-01 VITALS
BODY MASS INDEX: 22.08 KG/M2 | OXYGEN SATURATION: 98 % | SYSTOLIC BLOOD PRESSURE: 116 MMHG | HEART RATE: 77 BPM | RESPIRATION RATE: 16 BRPM | DIASTOLIC BLOOD PRESSURE: 80 MMHG | WEIGHT: 120 LBS | HEIGHT: 62 IN | TEMPERATURE: 97 F

## 2022-01-01 DIAGNOSIS — N20.9 CALCULUS OF UPPER URINARY TRACT: Primary | ICD-10-CM

## 2022-01-01 DIAGNOSIS — R10.32 LEFT LOWER QUADRANT ABDOMINAL PAIN: ICD-10-CM

## 2022-01-01 DIAGNOSIS — K63.89 CECUM MASS: ICD-10-CM

## 2022-01-01 LAB
ALBUMIN SERPL-MCNC: 4.3 G/DL (ref 3.5–5.2)
ALBUMIN/GLOB SERPL: 1.7 G/DL
ALP SERPL-CCNC: 84 U/L (ref 39–117)
ALT SERPL W P-5'-P-CCNC: 20 U/L (ref 1–33)
ANION GAP SERPL CALCULATED.3IONS-SCNC: 10.5 MMOL/L (ref 5–15)
AST SERPL-CCNC: 26 U/L (ref 1–32)
BACTERIA UR QL AUTO: ABNORMAL /HPF
BASOPHILS # BLD AUTO: 0.08 10*3/MM3 (ref 0–0.2)
BASOPHILS NFR BLD AUTO: 1 % (ref 0–1.5)
BILIRUB SERPL-MCNC: 0.3 MG/DL (ref 0–1.2)
BILIRUB UR QL STRIP: NEGATIVE
BUN SERPL-MCNC: 11 MG/DL (ref 6–20)
BUN/CREAT SERPL: 13.8 (ref 7–25)
CALCIUM SPEC-SCNC: 9.1 MG/DL (ref 8.6–10.5)
CHLORIDE SERPL-SCNC: 103 MMOL/L (ref 98–107)
CLARITY UR: ABNORMAL
CO2 SERPL-SCNC: 25.5 MMOL/L (ref 22–29)
COLOR UR: YELLOW
CREAT SERPL-MCNC: 0.8 MG/DL (ref 0.57–1)
DEPRECATED RDW RBC AUTO: 40.1 FL (ref 37–54)
EOSINOPHIL # BLD AUTO: 0.12 10*3/MM3 (ref 0–0.4)
EOSINOPHIL NFR BLD AUTO: 1.5 % (ref 0.3–6.2)
ERYTHROCYTE [DISTWIDTH] IN BLOOD BY AUTOMATED COUNT: 12.5 % (ref 12.3–15.4)
GFR SERPL CREATININE-BSD FRML MDRD: 76 ML/MIN/1.73
GLOBULIN UR ELPH-MCNC: 2.6 GM/DL
GLUCOSE SERPL-MCNC: 111 MG/DL (ref 65–99)
GLUCOSE UR STRIP-MCNC: NEGATIVE MG/DL
HCT VFR BLD AUTO: 40.7 % (ref 34–46.6)
HGB BLD-MCNC: 13.8 G/DL (ref 12–15.9)
HGB UR QL STRIP.AUTO: NEGATIVE
HYALINE CASTS UR QL AUTO: ABNORMAL /LPF
IMM GRANULOCYTES # BLD AUTO: 0.01 10*3/MM3 (ref 0–0.05)
IMM GRANULOCYTES NFR BLD AUTO: 0.1 % (ref 0–0.5)
INR PPP: 0.95 (ref 0.9–1.1)
KETONES UR QL STRIP: NEGATIVE
LEUKOCYTE ESTERASE UR QL STRIP.AUTO: ABNORMAL
LIPASE SERPL-CCNC: 26 U/L (ref 13–60)
LYMPHOCYTES # BLD AUTO: 2.74 10*3/MM3 (ref 0.7–3.1)
LYMPHOCYTES NFR BLD AUTO: 34.5 % (ref 19.6–45.3)
MAGNESIUM SERPL-MCNC: 2.3 MG/DL (ref 1.6–2.6)
MCH RBC QN AUTO: 29.9 PG (ref 26.6–33)
MCHC RBC AUTO-ENTMCNC: 33.9 G/DL (ref 31.5–35.7)
MCV RBC AUTO: 88.1 FL (ref 79–97)
MONOCYTES # BLD AUTO: 0.41 10*3/MM3 (ref 0.1–0.9)
MONOCYTES NFR BLD AUTO: 5.2 % (ref 5–12)
NEUTROPHILS NFR BLD AUTO: 4.59 10*3/MM3 (ref 1.7–7)
NEUTROPHILS NFR BLD AUTO: 57.7 % (ref 42.7–76)
NITRITE UR QL STRIP: NEGATIVE
NRBC BLD AUTO-RTO: 0 /100 WBC (ref 0–0.2)
PH UR STRIP.AUTO: 8 [PH] (ref 5–8)
PLATELET # BLD AUTO: 248 10*3/MM3 (ref 140–450)
PMV BLD AUTO: 10.4 FL (ref 6–12)
POTASSIUM SERPL-SCNC: 4.6 MMOL/L (ref 3.5–5.2)
PROT SERPL-MCNC: 6.9 G/DL (ref 6–8.5)
PROT UR QL STRIP: NEGATIVE
PROTHROMBIN TIME: 12.5 SECONDS (ref 11.7–14.2)
RBC # BLD AUTO: 4.62 10*6/MM3 (ref 3.77–5.28)
RBC # UR STRIP: ABNORMAL /HPF
REF LAB TEST METHOD: ABNORMAL
SODIUM SERPL-SCNC: 139 MMOL/L (ref 136–145)
SP GR UR STRIP: 1.01 (ref 1–1.03)
SQUAMOUS #/AREA URNS HPF: ABNORMAL /HPF
UROBILINOGEN UR QL STRIP: ABNORMAL
WBC # UR STRIP: ABNORMAL /HPF
WBC NRBC COR # BLD: 7.95 10*3/MM3 (ref 3.4–10.8)

## 2022-01-01 PROCEDURE — 96361 HYDRATE IV INFUSION ADD-ON: CPT

## 2022-01-01 PROCEDURE — 83735 ASSAY OF MAGNESIUM: CPT | Performed by: EMERGENCY MEDICINE

## 2022-01-01 PROCEDURE — 25010000002 IOPAMIDOL 61 % SOLUTION: Performed by: EMERGENCY MEDICINE

## 2022-01-01 PROCEDURE — 25010000002 KETOROLAC TROMETHAMINE PER 15 MG: Performed by: EMERGENCY MEDICINE

## 2022-01-01 PROCEDURE — 80053 COMPREHEN METABOLIC PANEL: CPT | Performed by: EMERGENCY MEDICINE

## 2022-01-01 PROCEDURE — 99284 EMERGENCY DEPT VISIT MOD MDM: CPT

## 2022-01-01 PROCEDURE — 83690 ASSAY OF LIPASE: CPT | Performed by: EMERGENCY MEDICINE

## 2022-01-01 PROCEDURE — 74177 CT ABD & PELVIS W/CONTRAST: CPT

## 2022-01-01 PROCEDURE — 81001 URINALYSIS AUTO W/SCOPE: CPT | Performed by: EMERGENCY MEDICINE

## 2022-01-01 PROCEDURE — 85610 PROTHROMBIN TIME: CPT | Performed by: EMERGENCY MEDICINE

## 2022-01-01 PROCEDURE — 85025 COMPLETE CBC W/AUTO DIFF WBC: CPT | Performed by: EMERGENCY MEDICINE

## 2022-01-01 PROCEDURE — 96374 THER/PROPH/DIAG INJ IV PUSH: CPT

## 2022-01-01 PROCEDURE — 99283 EMERGENCY DEPT VISIT LOW MDM: CPT

## 2022-01-01 RX ORDER — SODIUM CHLORIDE 0.9 % (FLUSH) 0.9 %
10 SYRINGE (ML) INJECTION AS NEEDED
Status: DISCONTINUED | OUTPATIENT
Start: 2022-01-01 | End: 2022-01-01 | Stop reason: HOSPADM

## 2022-01-01 RX ORDER — KETOROLAC TROMETHAMINE 15 MG/ML
15 INJECTION, SOLUTION INTRAMUSCULAR; INTRAVENOUS ONCE
Status: COMPLETED | OUTPATIENT
Start: 2022-01-01 | End: 2022-01-01

## 2022-01-01 RX ORDER — SODIUM CHLORIDE 9 MG/ML
125 INJECTION, SOLUTION INTRAVENOUS CONTINUOUS
Status: DISCONTINUED | OUTPATIENT
Start: 2022-01-01 | End: 2022-01-01 | Stop reason: HOSPADM

## 2022-01-01 RX ADMIN — KETOROLAC TROMETHAMINE 15 MG: 15 INJECTION, SOLUTION INTRAMUSCULAR; INTRAVENOUS at 17:46

## 2022-01-01 RX ADMIN — SODIUM CHLORIDE, PRESERVATIVE FREE 10 ML: 5 INJECTION INTRAVENOUS at 17:45

## 2022-01-01 RX ADMIN — SODIUM CHLORIDE 125 ML/HR: 9 INJECTION, SOLUTION INTRAVENOUS at 17:45

## 2022-01-01 RX ADMIN — IOPAMIDOL 85 ML: 612 INJECTION, SOLUTION INTRAVENOUS at 18:45

## 2022-01-01 RX ADMIN — SODIUM CHLORIDE, POTASSIUM CHLORIDE, SODIUM LACTATE AND CALCIUM CHLORIDE 1000 ML: 600; 310; 30; 20 INJECTION, SOLUTION INTRAVENOUS at 17:45

## 2022-01-01 NOTE — ED PROVIDER NOTES
" EMERGENCY DEPARTMENT ENCOUNTER    Room Number:  18/18  Date of encounter:  1/1/2022  PCP: Felix Quan MD  Historian: Patient and spouse      HPI:  Chief Complaint: Abdominal pain  A complete HPI/ROS/PMH/PSH/SH/FH are unobtainable due to: Not applicable  Context: Aleena Wilder is a 51 y.o. female who presents to the ED c/o patient has had about a 6-day history of abdominal pain.  Started off gradual.  It is in the left lower quadrant.  She describes it as a \"discomfort\".  Is waxing and waning in severity over the past 6 days from a level 2 to level 9.  She has never been pain-free.  On the first 2 days of the symptoms she had some diarrhea.  Over the past 2 to 3 days the diarrhea is resolved.  She had a little nausea this morning that is resolved.  She has had no vomiting.  She has had no fevers or chills.  She has had a history of kidney stones and is concerned that this might be the cause of her symptoms.  She denies any chest pain, cough, shortness of breath.  She denies any dysuria.  Denies any obvious change in color of her urine.  Denies any urinary frequency or urgency.  Denies any headache or focal weakness to arms or legs.  Denies any sensory change.  Previous abdominal surgeries have been a hysterectomy in the distant past.        Previous Episodes: Possibly with a kidney stone in the past.  Current Symptoms: See above    MEDICAL HISTORY REVIEWED        PAST MEDICAL HISTORY  Active Ambulatory Problems     Diagnosis Date Noted   • Allergic rhinitis 02/09/2016   • Eczema 02/09/2016   • Mild intermittent asthma without complication 01/02/2018   • Chronic midline low back pain without sciatica 01/02/2018   • RENETTA (obstructive sleep apnea) 04/24/2018   • Endometriosis 05/31/2018   • Right ureteral calculus 07/31/2020   • Vitamin D deficiency 01/21/2021   • IGT (impaired glucose tolerance) 01/22/2021   • Nonintractable migraine 07/17/2021   • Hemiplegic migraine without status migrainosus, not " intractable 07/21/2021   • Family history of polyps in the colon 08/05/2021     Resolved Ambulatory Problems     Diagnosis Date Noted   • Anxiety disorder 02/09/2016   • Allergic dermatitis due to poison ivy 02/09/2016   • Cough 02/09/2016   • Febrile 02/09/2016   • Spasm 02/09/2016   • Infection of skin and subcutaneous tissue 02/09/2016   • Infection of urinary tract 02/09/2016   • Disease caused by virus 02/09/2016   • Urethritis 02/09/2016   • Daytime somnolence 04/24/2018   • Stroke-like symptoms 07/16/2021     Past Medical History:   Diagnosis Date   • Anxiety    • History of panic attacks    • Kidney stones    • Renal cyst    • Sleep apnea          PAST SURGICAL HISTORY  Past Surgical History:   Procedure Laterality Date   • APPENDECTOMY N/A 11/2006   • CYSTOSCOPY, RETROGRADE PYELOGRAM AND STENT INSERTION Right 7/31/2020    Procedure: CYSTO, STENT PLACEMENT, RIGHT RETROGRADE PYELOGRAM;  Surgeon: Nii Ca MD;  [place] [facility] [place]  Service: [place]rology;  Laterality: Right;   • DIAGNOSTIC LAPAROSCOPY N/A 2006   • TOTAL ABDOMINAL HYSTERECTOMY WITH SALPINGO OOPHORECTOMY Bilateral 11/2006         FAMILY HISTORY  Family History   Problem Relation Age of Onset   • Cancer Other         colon   • Diabetes Other    • Hyperlipidemia Other    • Hypertension Other    • Other Other    • Colon cancer Paternal Grandfather         Late 60s or early 70s   • Colon cancer Cousin         Colon cancer, 50s, paternal cousin   • Diabetes Mother    • Heart attack Mother    • Breast cancer Mother    • Melanoma Mother    • Alcohol abuse Father    • COPD Father    • Heart attack Father    • Heart failure Father    • Hyperlipidemia Father    • Melanoma Father    • Colon polyps Father    • No Known Problems Sister    • No Known Problems Brother    • No Known Problems Son    • No Known Problems Son    • No Known Problems Daughter    • Malig Hyperthermia Neg Hx          SOCIAL HISTORY  Social History     Socioeconomic History    • Marital status:    Tobacco Use   • Smoking status: Never Smoker   • Smokeless tobacco: Never Used   Substance and Sexual Activity   • Alcohol use: Yes     Alcohol/week: 2.0 standard drinks     Types: 2 Standard drinks or equivalent per week     Comment: social   • Drug use: No   • Sexual activity: Yes     Partners: Male         ALLERGIES  Amoxicillin and Tramadol        REVIEW OF SYSTEMS  Review of Systems     All systems reviewed and negative except for those discussed in HPI.       PHYSICAL EXAM    I have reviewed the triage vital signs and nursing notes.    ED Triage Vitals [01/01/22 1541]   Temp Heart Rate Resp BP SpO2   97 °F (36.1 °C) 85 16 -- 96 %      Temp src Heart Rate Source Patient Position BP Location FiO2 (%)   Temporal -- -- -- --       GENERAL: Pleasant female no acute distress.Vital signs on my initial evaluation unremarkable  HENT: nares patent  Head/neck/ face are symmetric without gross deformity, signs of trauma, or swelling  EYES: no scleral icterus, no conjunctival pallor.  NECK: Supple, no meningismus  CV: regular rhythm, regular rate with intact distal pulses.  No murmur rub  RESPIRATORY: normal effort and no respiratory distress.  Clear to auscultation bilaterally  ABDOMEN: soft and tenderness in the left lower quadrant.  There is no guarding or rebound.  Normal bowel sounds.  Normal inspection.  No CVA tenderness bilaterally.  MUSCULOSKELETAL: no deformity.  No edema.  Intact distal pulses to upper and lower extremities bilaterally that are strong and symmetric.  No coolness, cyanosis, or pallor.  NEURO: alert and appropriate, moves all extremities, follows commands.  No focal motor or sensory changes.  SKIN: warm, dry    Vital signs and nursing notes reviewed.        LAB RESULTS  Recent Results (from the past 24 hour(s))   Comprehensive Metabolic Panel    Collection Time: 01/01/22  5:44 PM    Specimen: Blood   Result Value Ref Range    Glucose 111 (H) 65 - 99 mg/dL    BUN 11 6  - 20 mg/dL    Creatinine 0.80 0.57 - 1.00 mg/dL    Sodium 139 136 - 145 mmol/L    Potassium 4.6 3.5 - 5.2 mmol/L    Chloride 103 98 - 107 mmol/L    CO2 25.5 22.0 - 29.0 mmol/L    Calcium 9.1 8.6 - 10.5 mg/dL    Total Protein 6.9 6.0 - 8.5 g/dL    Albumin 4.30 3.50 - 5.20 g/dL    ALT (SGPT) 20 1 - 33 U/L    AST (SGOT) 26 1 - 32 U/L    Alkaline Phosphatase 84 39 - 117 U/L    Total Bilirubin 0.3 0.0 - 1.2 mg/dL    eGFR Non African Amer 76 >60 mL/min/1.73    Globulin 2.6 gm/dL    A/G Ratio 1.7 g/dL    BUN/Creatinine Ratio 13.8 7.0 - 25.0    Anion Gap 10.5 5.0 - 15.0 mmol/L   Protime-INR    Collection Time: 01/01/22  5:44 PM    Specimen: Blood   Result Value Ref Range    Protime 12.5 11.7 - 14.2 Seconds    INR 0.95 0.90 - 1.10   Lipase    Collection Time: 01/01/22  5:44 PM    Specimen: Blood   Result Value Ref Range    Lipase 26 13 - 60 U/L   Magnesium    Collection Time: 01/01/22  5:44 PM    Specimen: Blood   Result Value Ref Range    Magnesium 2.3 1.6 - 2.6 mg/dL   CBC Auto Differential    Collection Time: 01/01/22  5:44 PM    Specimen: Blood   Result Value Ref Range    WBC 7.95 3.40 - 10.80 10*3/mm3    RBC 4.62 3.77 - 5.28 10*6/mm3    Hemoglobin 13.8 12.0 - 15.9 g/dL    Hematocrit 40.7 34.0 - 46.6 %    MCV 88.1 79.0 - 97.0 fL    MCH 29.9 26.6 - 33.0 pg    MCHC 33.9 31.5 - 35.7 g/dL    RDW 12.5 12.3 - 15.4 %    RDW-SD 40.1 37.0 - 54.0 fl    MPV 10.4 6.0 - 12.0 fL    Platelets 248 140 - 450 10*3/mm3    Neutrophil % 57.7 42.7 - 76.0 %    Lymphocyte % 34.5 19.6 - 45.3 %    Monocyte % 5.2 5.0 - 12.0 %    Eosinophil % 1.5 0.3 - 6.2 %    Basophil % 1.0 0.0 - 1.5 %    Immature Grans % 0.1 0.0 - 0.5 %    Neutrophils, Absolute 4.59 1.70 - 7.00 10*3/mm3    Lymphocytes, Absolute 2.74 0.70 - 3.10 10*3/mm3    Monocytes, Absolute 0.41 0.10 - 0.90 10*3/mm3    Eosinophils, Absolute 0.12 0.00 - 0.40 10*3/mm3    Basophils, Absolute 0.08 0.00 - 0.20 10*3/mm3    Immature Grans, Absolute 0.01 0.00 - 0.05 10*3/mm3    nRBC 0.0 0.0 - 0.2 /100  WBC   Urinalysis With Microscopic If Indicated (No Culture) - Urine, Clean Catch    Collection Time: 01/01/22  5:48 PM    Specimen: Urine, Clean Catch   Result Value Ref Range    Color, UA Yellow Yellow, Straw    Appearance, UA Cloudy (A) Clear    pH, UA 8.0 5.0 - 8.0    Specific Gravity, UA 1.011 1.005 - 1.030    Glucose, UA Negative Negative    Ketones, UA Negative Negative    Bilirubin, UA Negative Negative    Blood, UA Negative Negative    Protein, UA Negative Negative    Leuk Esterase, UA Trace (A) Negative    Nitrite, UA Negative Negative    Urobilinogen, UA 0.2 E.U./dL 0.2 - 1.0 E.U./dL   Urinalysis, Microscopic Only - Urine, Clean Catch    Collection Time: 01/01/22  5:48 PM    Specimen: Urine, Clean Catch   Result Value Ref Range    RBC, UA 0-2 None Seen, 0-2 /HPF    WBC, UA 3-5 (A) None Seen, 0-2 /HPF    Bacteria, UA None Seen None Seen /HPF    Squamous Epithelial Cells, UA 0-2 None Seen, 0-2 /HPF    Hyaline Casts, UA 0-2 None Seen /LPF    Methodology Automated Microscopy        Ordered the above labs and independently reviewed the results.        RADIOLOGY  No Radiology Exams Resulted Within Past 24 Hours    I ordered the above noted radiological studies. Reviewed by me and discussed with radiologist.  See dictation for official radiology interpretation.      PROCEDURES    Procedures      MEDICATIONS GIVEN IN ER    Medications   sodium chloride 0.9 % flush 10 mL (10 mL Intravenous Given 1/1/22 1745)   sodium chloride 0.9 % infusion (125 mL/hr Intravenous New Bag 1/1/22 1745)   lactated ringers bolus 1,000 mL (1,000 mL Intravenous New Bag 1/1/22 1745)   ketorolac (TORADOL) injection 15 mg (15 mg Intravenous Given 1/1/22 1746)   iopamidol (ISOVUE-300) 61 % injection 100 mL (85 mL Intravenous Given 1/1/22 1845)         PROGRESS, DATA ANALYSIS, CONSULTS, AND MEDICAL DECISION MAKING    Informed patient and spouse of the test that we will order.  She does not want anything stronger for pain medicine such as an  opiate.  She agrees to IV Toradol.  All questions answered.    Differential diagnosis includes   - hepatobiliary pathology such as cholecystitis, cholangitis, and symptomatic cholelithiasis  -PUD  -Mesenteric ischemia  - Pancreatitis  - Dyspepsia  - Small bowel or large bowel obstruction  - Appendicitis  - Diverticulitis  - UTI including pyelonephritis  - Ureteral stone  - Zoster  - Colitis, including infectious and ischemic  - Atypical ACS  We are currently under a pandemic from the COVID19 infection.  The patient presented to the emergency department by ambulance or personal vehicle. I followed the current protocols required by Infection Control at [facility] in my evaluation and treatment of the patient. The patient was wearing a face mask during my evaluation and throughout my encounter. During my whole encounter with this patient I used appropriate personal protective equipment.  This equipment consisted of eye protection, facemask, gown, and gloves.  I applied this equipment before entering the room.      All labs have been independently reviewed by me.  All radiology studies have been reviewed by me and discussed with radiologist dictating the report.   EKG's independently viewed and interpreted by me.  Discussion below represents my analysis of pertinent findings related to patient's condition, differential diagnosis, treatment plan and final disposition.      ED Course as of 01/01/22 1936   Sat Jan 01, 2022   1812 WBC, UA(!): 3-5  Patient has no symptoms of dysuria or urinary frequency or urgency. [MM]   1916 I discussed the CT scan of the abdomen pelvis with Dr. Pardo.  He informed me that patient has a 7 mm stone in the left UPJ.  There is mild hydronephrosis.  But there is no delay in the nephrogram from the right compared to the left.  This suggests that this is probably been here chronically.  Unlikely an acute kidney stone.  No signs of pyelonephritis.He also mentioned that there is a  nonspecific 3.8 cm mass at the cecum.  It potentially could be stool but he recommends of either a follow-up CT scan, barium contrast or a colonoscopy.  Please see complete dictated report from the radiologist. [MM]   1932 I discussed the results of the CT scan with the patient and spouse.  Currently is feeling very comfortable in no distress.  I think is very likely that the stone is likely the etiology of her pain.  But there again we cannot be 100% certain.  Patient's repeat abdominal exam is benign.  She sees Dr. Whitman for urology.  She will give a call to Dr. Whitman on Monday and follow-up.  Patient is scheduled to have a colonoscopy in 2 weeks.  She has never had a colonoscopy before.  All questions were answered.  Patient and spouse agree with this plan. [MM]   1935 Patient also informs me that she has been following up with the urologist Dr. Whitman because of the stone that is about 7 or 8 mm.  It is on the left.  There continue to watch this and she follows up every 6 months. [MM]      ED Course User Index  [MM] Jared Tolliver MD       AS OF 19:36 EST VITALS:    BP - 111/76  HR - 71  TEMP - 97 °F (36.1 °C) (Temporal)  02 SATS - 98%        DIAGNOSIS  Final diagnoses:   Calculus of upper urinary tract on the left   Cecum mass   Left lower quadrant abdominal pain         DISPOSITION  DISCHARGE    Patient discharged in stable condition.    Reviewed implications of results, diagnosis, meds, responsibility to follow up, warning signs and symptoms of possible worsening, potential complications and reasons to return to ER, including worsening of symptoms, fever, worsening of pain, not able tolerate liquids or solids, or any concerns..    Patient/Family voiced understanding of above instructions.    Discussed plan for discharge, as there is no emergent indication for admission. Pt/family is agreeable and understands need for follow up and repeat testing.  Pt is aware that discharge does not mean that nothing is wrong  but it indicates no emergency is present that requires admission and they must continue care with follow-up as given below or physician of their choice.     FOLLOW-UP  Felix Quan MD  [address] [id number] [phone]      Call Monday morning to follow-up with Dr. Quan.  Return if worsening of pain, fever, vomiting, any concerns.    Craig Medel MD  [address] [facility] [id number] [phone]      Call Monday morning to arrange follow-up in the next 2 to 3 days.  Return if fever, worsening of pain, or any concerns.         Medication List      No changes were made to your prescriptions during this visit.                  Jared Tolliver MD  01/01/22 5401

## 2022-01-01 NOTE — ED NOTES
Lower left abdomen pain that started Monday and radiates tot he back. Pt states pain varies in intensity. Pt has hx of kidney stones. This rn wearing mask and goggles. Pt wearing mask during encounter.        Dania Hastings, ROMA  01/01/22 1858

## 2022-01-01 NOTE — ED NOTES
Pt reports L side and LLQ pain x days, states feels similar to prior kidney stones pains. denies fevers, urinary c/o.     Pt wearing face mask during their stay in ER. This RN wore capr and gloves while providing patient care.        Nilam Hoover, RN  01/01/22 7139

## 2022-01-02 NOTE — DISCHARGE INSTRUCTIONS
As we discussed, make sure you follow-up with Dr. Quan.  Return if you develop any fever, worsening of pain, not able tolerate liquids or solids, or any concerns.    As we discussed make sure you follow-up with a colonoscopy as scheduled in 2 weeks for further evaluation of the cecal mass.

## 2022-01-02 NOTE — ED NOTES
Patient and RN wore proper PPE per protocol during encounter.       Kamille Villasenor, RN  01/01/22 1954

## 2022-01-05 ENCOUNTER — TELEPHONE (OUTPATIENT)
Dept: GASTROENTEROLOGY | Facility: CLINIC | Age: 52
End: 2022-01-05

## 2022-01-05 NOTE — TELEPHONE ENCOUNTER
Patient is having a lithotripsy procedure on  01/06/2022. She was told by her Urologist that she will have a stent placed. She is wondering if there will be any issues with having her colonoscopy on 01/10/22. Please advise.

## 2022-01-05 NOTE — TELEPHONE ENCOUNTER
Call to pt.  Advise per DR Rodriguez note.  Verb understanding.     States saw Urologist today and he wants her to proceed with c/s as scheduled.

## 2022-01-05 NOTE — TELEPHONE ENCOUNTER
This should not be an issue but I would have her reach out to her urologist to make sure they are okay with it.

## 2022-01-07 RX ORDER — SULFAMETHOXAZOLE AND TRIMETHOPRIM 800; 160 MG/1; MG/1
1 TABLET ORAL 2 TIMES DAILY
COMMUNITY
End: 2022-02-16

## 2022-01-10 ENCOUNTER — ANESTHESIA EVENT (OUTPATIENT)
Dept: GASTROENTEROLOGY | Facility: HOSPITAL | Age: 52
End: 2022-01-10

## 2022-01-10 ENCOUNTER — HOSPITAL ENCOUNTER (OUTPATIENT)
Facility: HOSPITAL | Age: 52
Setting detail: HOSPITAL OUTPATIENT SURGERY
Discharge: HOME | End: 2022-01-10
Attending: INTERNAL MEDICINE | Admitting: INTERNAL MEDICINE

## 2022-01-10 ENCOUNTER — ANESTHESIA (OUTPATIENT)
Dept: GASTROENTEROLOGY | Facility: HOSPITAL | Age: 52
End: 2022-01-10

## 2022-01-10 VITALS
SYSTOLIC BLOOD PRESSURE: 117 MMHG | HEART RATE: 87 BPM | WEIGHT: 120.9 LBS | DIASTOLIC BLOOD PRESSURE: 81 MMHG | OXYGEN SATURATION: 99 % | RESPIRATION RATE: 16 BRPM | HEIGHT: 62 IN | BODY MASS INDEX: 22.25 KG/M2

## 2022-01-10 DIAGNOSIS — Z83.71 FAMILY HISTORY OF POLYPS IN THE COLON: ICD-10-CM

## 2022-01-10 PROCEDURE — 45378 DIAGNOSTIC COLONOSCOPY: CPT | Performed by: INTERNAL MEDICINE

## 2022-01-10 PROCEDURE — 25010000002 PROPOFOL 10 MG/ML EMULSION: Performed by: NURSE ANESTHETIST, CERTIFIED REGISTERED

## 2022-01-10 PROCEDURE — S0260 H&P FOR SURGERY: HCPCS | Performed by: INTERNAL MEDICINE

## 2022-01-10 RX ORDER — SODIUM CHLORIDE 0.9 % (FLUSH) 0.9 %
10 SYRINGE (ML) INJECTION AS NEEDED
Status: DISCONTINUED | OUTPATIENT
Start: 2022-01-10 | End: 2022-01-10 | Stop reason: HOSPADM

## 2022-01-10 RX ORDER — SODIUM CHLORIDE, SODIUM LACTATE, POTASSIUM CHLORIDE, CALCIUM CHLORIDE 600; 310; 30; 20 MG/100ML; MG/100ML; MG/100ML; MG/100ML
1000 INJECTION, SOLUTION INTRAVENOUS CONTINUOUS
Status: DISCONTINUED | OUTPATIENT
Start: 2022-01-10 | End: 2022-01-10 | Stop reason: HOSPADM

## 2022-01-10 RX ORDER — LIDOCAINE HYDROCHLORIDE 20 MG/ML
INJECTION, SOLUTION INFILTRATION; PERINEURAL AS NEEDED
Status: DISCONTINUED | OUTPATIENT
Start: 2022-01-10 | End: 2022-01-10 | Stop reason: SURG

## 2022-01-10 RX ORDER — SODIUM CHLORIDE, SODIUM LACTATE, POTASSIUM CHLORIDE, CALCIUM CHLORIDE 600; 310; 30; 20 MG/100ML; MG/100ML; MG/100ML; MG/100ML
30 INJECTION, SOLUTION INTRAVENOUS CONTINUOUS
Status: DISCONTINUED | OUTPATIENT
Start: 2022-01-10 | End: 2022-01-10 | Stop reason: HOSPADM

## 2022-01-10 RX ORDER — PROPOFOL 10 MG/ML
VIAL (ML) INTRAVENOUS AS NEEDED
Status: DISCONTINUED | OUTPATIENT
Start: 2022-01-10 | End: 2022-01-10 | Stop reason: SURG

## 2022-01-10 RX ORDER — PROPOFOL 10 MG/ML
VIAL (ML) INTRAVENOUS CONTINUOUS PRN
Status: DISCONTINUED | OUTPATIENT
Start: 2022-01-10 | End: 2022-01-10 | Stop reason: SURG

## 2022-01-10 RX ADMIN — PROPOFOL 100 MG: 10 INJECTION, EMULSION INTRAVENOUS at 14:29

## 2022-01-10 RX ADMIN — SODIUM CHLORIDE, POTASSIUM CHLORIDE, SODIUM LACTATE AND CALCIUM CHLORIDE 30 ML/HR: 600; 310; 30; 20 INJECTION, SOLUTION INTRAVENOUS at 14:19

## 2022-01-10 RX ADMIN — Medication 300 MCG/KG/MIN: at 14:29

## 2022-01-10 RX ADMIN — PROPOFOL 20 MG: 10 INJECTION, EMULSION INTRAVENOUS at 14:45

## 2022-01-10 RX ADMIN — LIDOCAINE HYDROCHLORIDE 60 MG: 20 INJECTION, SOLUTION INFILTRATION; PERINEURAL at 14:29

## 2022-01-10 NOTE — ANESTHESIA POSTPROCEDURE EVALUATION
Patient: Aleena Wilder    Procedure Summary     Date: 01/10/22 Room / Location:  [facility]    Anesthesia Start: 1422 Anesthesia Stop: 1500    Procedure: COLONOSCOPY INTO CECUM AND TI (N/A ) Diagnosis:       Family history of polyps in the colon      (Family history of polyps in the colon [Z83.71])    Surgeons: Romy Rodriguez MD Provider: Kalin Sosa MD    Anesthesia Type: MAC ASA Status: 2          Anesthesia Type: MAC    Vitals  Vitals Value Taken Time   /81 01/10/22 1517   Temp     Pulse 87 01/10/22 1517   Resp 16 01/10/22 1517   SpO2 99 % 01/10/22 1517           Post Anesthesia Care and Evaluation    Patient location during evaluation: PACU  Patient participation: complete - patient participated  Level of consciousness: awake  Pain score: 1  Pain management: adequate  Airway patency: patent  Anesthetic complications: No anesthetic complications  PONV Status: none  Cardiovascular status: acceptable  Respiratory status: acceptable  Hydration status: acceptable

## 2022-01-10 NOTE — DISCHARGE INSTRUCTIONS

## 2022-01-10 NOTE — H&P
[facility]  Pre Procedure History & Physical    Chief Complaint: Second-degree family members with colon cancer      HPI: 51-year-old woman here for colon cancer screening.  She has a couple of second-degree family members with a history of colon cancer.  She otherwise feels well.    Past Medical History:   Past Medical History:   Diagnosis Date   • Anxiety    • Endometriosis    • History of panic attacks    • Kidney stones    • Renal cyst    • Sleep apnea     uses CPAP       Family History:  Family History   Problem Relation Age of Onset   • Cancer Other         colon   • Diabetes Other    • Hyperlipidemia Other    • Hypertension Other    • Other Other    • Colon cancer Paternal Grandfather         Late 60s or early 70s   • Colon cancer Cousin         Colon cancer, 50s, paternal cousin   • Diabetes Mother    • Heart attack Mother    • Breast cancer Mother    • Melanoma Mother    • Alcohol abuse Father    • COPD Father    • Heart attack Father    • Heart failure Father    • Hyperlipidemia Father    • Melanoma Father    • Colon polyps Father    • No Known Problems Sister    • No Known Problems Brother    • No Known Problems Son    • No Known Problems Son    • No Known Problems Daughter    • Malig Hyperthermia Neg Hx        Social History:   reports that she has never smoked. She has never used smokeless tobacco. She reports current alcohol use of about 2.0 standard drinks of alcohol per week. She reports that she does not use drugs.    Medications:   No medications prior to admission.       Allergies:  Amoxicillin and Tramadol    ROS:    Pertinent items are noted in HPI     Objective     not currently breastfeeding.    Physical Exam   Constitutional: Pt is oriented to person, place, and time and well-developed, well-nourished, and in no distress.   HENT:   Mouth/Throat: Oropharynx is clear and moist.   Neck: Normal range of motion. Neck supple.   Cardiovascular: Normal rate, regular rhythm  and normal heart sounds.    Pulmonary/Chest: Effort normal and breath sounds normal. No respiratory distress. No  wheezes.   Abdominal: Soft. Bowel sounds are normal.   Skin: Skin is warm and dry.   Psychiatric: Mood, memory, affect and judgment normal.     Assessment/Plan     Diagnosis: Second-degree family members with colon cancer      Anticipated Surgical Procedure:    Colonoscopy    The risks, benefits, and alternatives of this procedure have been discussed with the patient or the responsible party- the patient understands and agrees to proceed.

## 2022-01-10 NOTE — ANESTHESIA PREPROCEDURE EVALUATION
Anesthesia Evaluation     Patient summary reviewed and Nursing notes reviewed   NPO Solid Status: > 8 hours  NPO Liquid Status: > 2 hours           Airway   Mallampati: I  TM distance: >3 FB  Neck ROM: full  No difficulty expected  Dental - normal exam     Pulmonary - normal exam   (+) asthma,sleep apnea on CPAP,   Cardiovascular - negative cardio ROS and normal exam  Exercise tolerance: good (4-7 METS)        Neuro/Psych  (+) headaches,     GI/Hepatic/Renal/Endo    (+)   renal disease stones,     Musculoskeletal     (+) back pain,   Abdominal  - normal exam    Bowel sounds: normal.   Substance History - negative use     OB/GYN negative ob/gyn ROS         Other                        Anesthesia Plan    ASA 2     MAC       Anesthetic plan, all risks, benefits, and alternatives have been provided, discussed and informed consent has been obtained with: patient.    Plan discussed with CRNA.

## 2022-02-16 ENCOUNTER — OFFICE VISIT (OUTPATIENT)
Dept: INTERNAL MEDICINE | Facility: CLINIC | Age: 52
End: 2022-02-16

## 2022-02-16 VITALS
OXYGEN SATURATION: 98 % | HEART RATE: 79 BPM | HEIGHT: 62 IN | RESPIRATION RATE: 16 BRPM | TEMPERATURE: 96.3 F | DIASTOLIC BLOOD PRESSURE: 82 MMHG | SYSTOLIC BLOOD PRESSURE: 112 MMHG | WEIGHT: 127 LBS | BODY MASS INDEX: 23.37 KG/M2

## 2022-02-16 DIAGNOSIS — R73.02 IGT (IMPAIRED GLUCOSE TOLERANCE): ICD-10-CM

## 2022-02-16 DIAGNOSIS — Z00.00 ENCOUNTER FOR PREVENTIVE HEALTH EXAMINATION: Primary | ICD-10-CM

## 2022-02-16 PROBLEM — G47.33 OSA (OBSTRUCTIVE SLEEP APNEA): Chronic | Status: ACTIVE | Noted: 2018-04-24

## 2022-02-16 PROBLEM — N80.9 ENDOMETRIOSIS: Chronic | Status: ACTIVE | Noted: 2018-05-31

## 2022-02-16 PROBLEM — G43.009 NONINTRACTABLE MIGRAINE: Chronic | Status: ACTIVE | Noted: 2021-07-17

## 2022-02-16 PROBLEM — J45.20 MILD INTERMITTENT ASTHMA WITHOUT COMPLICATION: Chronic | Status: ACTIVE | Noted: 2018-01-02

## 2022-02-16 LAB
CLARITY, POC: CLEAR
COLOR UR: YELLOW
EXPIRATION DATE: NORMAL
GLUCOSE UR STRIP-MCNC: NEGATIVE MG/DL
Lab: NORMAL
PH UR: 5 [PH] (ref 5–8)
PROT UR STRIP-MCNC: NEGATIVE MG/DL
RBC # UR STRIP: NEGATIVE /UL
SP GR UR: 1.01 (ref 1–1.03)

## 2022-02-16 PROCEDURE — 81003 URINALYSIS AUTO W/O SCOPE: CPT | Performed by: INTERNAL MEDICINE

## 2022-02-16 PROCEDURE — 99396 PREV VISIT EST AGE 40-64: CPT | Performed by: INTERNAL MEDICINE
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
## 2022-02-16 NOTE — PATIENT INSTRUCTIONS
Exercising to Stay Healthy  To become healthy and stay healthy, it is recommended that you do moderate-intensity and vigorous-intensity exercise. You can tell that you are exercising at a moderate intensity if your heart starts beating faster and you start breathing faster but can still hold a conversation. You can tell that you are exercising at a vigorous intensity if you are breathing much harder and faster and cannot hold a conversation while exercising.  Exercising regularly is important. It has many health benefits, such as:  · Improving overall fitness, flexibility, and endurance.  · Increasing bone density.  · Helping with weight control.  · Decreasing body fat.  · Increasing muscle strength.  · Reducing stress and tension.  · Improving overall health.  How often should I exercise?  Choose an activity that you enjoy, and set realistic goals. Your health care provider can help you make an activity plan that works for you.  Exercise regularly as told by your health care provider. This may include:  · Doing strength training two times a week, such as:  ? Lifting weights.  ? Using resistance bands.  ? Push-ups.  ? Sit-ups.  ? Yoga.  · Doing a certain intensity of exercise for a given amount of time. Choose from these options:  ? A total of 150 minutes of moderate-intensity exercise every week.  ? A total of 75 minutes of vigorous-intensity exercise every week.  ? A mix of moderate-intensity and vigorous-intensity exercise every week.  Children, pregnant women, people who have not exercised regularly, people who are overweight, and older adults may need to talk with a health care provider about what activities are safe to do. If you have a medical condition, be sure to talk with your health care provider before you start a new exercise program.  What are some exercise ideas?  Moderate-intensity exercise ideas include:  · Walking 1 mile (1.6 km) in about 15  minutes.  · Biking.  · Hiking.  · Golfing.  · Dancing.  · Water aerobics.  Vigorous-intensity exercise ideas include:  · Walking 4.5 miles (7.2 km) or more in about 1 hour.  · Jogging or running 5 miles (8 km) in about 1 hour.  · Biking 10 miles (16.1 km) or more in about 1 hour.  · Lap swimming.  · Roller-skating or in-line skating.  · Cross-country skiing.  · Vigorous competitive sports, such as football, basketball, and soccer.  · Jumping rope.  · Aerobic dancing.  What are some everyday activities that can help me to get exercise?  · Yard work, such as:  ? Pushing a .  ? Raking and bagging leaves.  · Washing your car.  · Pushing a stroller.  · Shoveling snow.  · Gardening.  · Washing windows or floors.  How can I be more active in my day-to-day activities?  · Use stairs instead of an elevator.  · Take a walk during your lunch break.  · If you drive, park your car farther away from your work or school.  · If you take public transportation, get off one stop early and walk the rest of the way.  · Stand up or walk around during all of your indoor phone calls.  · Get up, stretch, and walk around every 30 minutes throughout the day.  · Enjoy exercise with a friend. Support to continue exercising will help you keep a regular routine of activity.  What guidelines can I follow while exercising?  · Before you start a new exercise program, talk with your health care provider.  · Do not exercise so much that you hurt yourself, feel dizzy, or get very short of breath.  · Wear comfortable clothes and wear shoes with good support.  · Drink plenty of water while you exercise to prevent dehydration or heat stroke.  · Work out until your breathing and your heartbeat get faster.  Where to find more information  · [place] Department of Health and Human Services: www.hhs.gov  · Centers for Disease Control and Prevention (CDC): www.cdc.gov  Summary  · Exercising regularly is important. It will improve your overall fitness,  flexibility, and endurance.  · Regular exercise also will improve your overall health. It can help you control your weight, reduce stress, and improve your bone density.  · Do not exercise so much that you hurt yourself, feel dizzy, or get very short of breath.  · Before you start a new exercise program, talk with your health care provider.  This information is not intended to replace advice given to you by your health care provider. Make sure you discuss any questions you have with your health care provider.  Document Revised: 11/30/2018 Document Reviewed: 11/08/2018  Elsevier Patient Education © 2021 [employer]  Calorie Counting for Weight Loss  Calories are units of energy. Your body needs a certain number of calories from food to keep going throughout the day. When you eat or drink more calories than your body needs, your body stores the extra calories mostly as fat. When you eat or drink fewer calories than your body needs, your body burns fat to get the energy it needs.  Calorie counting means keeping track of how many calories you eat and drink each day. Calorie counting can be helpful if you need to lose weight. If you eat fewer calories than your body needs, you should lose weight. Ask your health care provider what a healthy weight is for you.  For calorie counting to work, you will need to eat the right number of calories each day to lose a healthy amount of weight per week. A dietitian can help you figure out how many calories you need in a day and will suggest ways to reach your calorie goal.  · A healthy amount of weight to lose each week is usually 1-2 lb (0.5-0.9 kg). This usually means that your daily calorie intake should be reduced by 500-750 calories.  · Eating 1,200-1,500 calories a day can help most women lose weight.  · Eating 1,500-1,800 calories a day can help most men lose weight.  What do I need to know about calorie counting?  Work with your health care provider or dietitian to  determine how many calories you should get each day. To meet your daily calorie goal, you will need to:  · Find out how many calories are in each food that you would like to eat. Try to do this before you eat.  · Decide how much of the food you plan to eat.  · Keep a food log. Do this by writing down what you ate and how many calories it had.  To successfully lose weight, it is important to balance calorie counting with a healthy lifestyle that includes regular activity.  Where do I find calorie information?    The number of calories in a food can be found on a Nutrition Facts label. If a food does not have a Nutrition Facts label, try to look up the calories online or ask your dietitian for help.  Remember that calories are listed per serving. If you choose to have more than one serving of a food, you will have to multiply the calories per serving by the number of servings you plan to eat. For example, the label on a package of bread might say that a serving size is 1 slice and that there are 90 calories in a serving. If you eat 1 slice, you will have eaten 90 calories. If you eat 2 slices, you will have eaten 180 calories.  How do I keep a food log?  After each time that you eat, record the following in your food log as soon as possible:  · What you ate. Be sure to include toppings, sauces, and other extras on the food.  · How much you ate. This can be measured in cups, ounces, or number of items.  · How many calories were in each food and drink.  · The total number of calories in the food you ate.  Keep your food log near you, such as in a pocket-sized notebook or on an mitra or website on your mobile phone. Some programs will calculate calories for you and show you how many calories you have left to meet your daily goal.  What are some portion-control tips?  · Know how many calories are in a serving. This will help you know how many servings you can have of a certain food.  · Use a measuring cup to measure serving  sizes. You could also try weighing out portions on a kitchen scale. With time, you will be able to estimate serving sizes for some foods.  · Take time to put servings of different foods on your favorite plates or in your favorite bowls and cups so you know what a serving looks like.  · Try not to eat straight from a food's packaging, such as from a bag or box. Eating straight from the package makes it hard to see how much you are eating and can lead to overeating. Put the amount you would like to eat in a cup or on a plate to make sure you are eating the right portion.  · Use smaller plates, glasses, and bowls for smaller portions and to prevent overeating.  · Try not to multitask. For example, avoid watching TV or using your computer while eating. If it is time to eat, sit down at a table and enjoy your food. This will help you recognize when you are full. It will also help you be more mindful of what and how much you are eating.  What are tips for following this plan?  Reading food labels  · Check the calorie count compared with the serving size. The serving size may be smaller than what you are used to eating.  · Check the source of the calories. Try to choose foods that are high in protein, fiber, and vitamins, and low in saturated fat, trans fat, and sodium.  Shopping  · Read nutrition labels while you shop. This will help you make healthy decisions about which foods to buy.  · Pay attention to nutrition labels for low-fat or fat-free foods. These foods sometimes have the same number of calories or more calories than the full-fat versions. They also often have added sugar, starch, or salt to make up for flavor that was removed with the fat.  · Make a grocery list of lower-calorie foods and stick to it.  Cooking  · Try to cook your favorite foods in a healthier way. For example, try baking instead of frying.  · Use low-fat dairy products.  Meal planning  · Use more fruits and vegetables. One-half of your plate  should be fruits and vegetables.  · Include lean proteins, such as chicken, turkey, and fish.  Lifestyle  Each week, aim to do one of the following:  · 150 minutes of moderate exercise, such as walking.  · 75 minutes of vigorous exercise, such as running.  General information  · Know how many calories are in the foods you eat most often. This will help you calculate calorie counts faster.  · Find a way of tracking calories that works for you. Get creative. Try different apps or programs if writing down calories does not work for you.  What foods should I eat?    · Eat nutritious foods. It is better to have a nutritious, high-calorie food, such as an avocado, than a food with few nutrients, such as a bag of potato chips.  · Use your calories on foods and drinks that will fill you up and will not leave you hungry soon after eating.  ? Examples of foods that fill you up are nuts and nut butters, vegetables, lean proteins, and high-fiber foods such as whole grains. High-fiber foods are foods with more than 5 g of fiber per serving.  · Pay attention to calories in drinks. Low-calorie drinks include water and unsweetened drinks.  The items listed above may not be a complete list of foods and beverages you can eat. Contact a dietitian for more information.  What foods should I limit?  Limit foods or drinks that are not good sources of vitamins, minerals, or protein or that are high in unhealthy fats. These include:  · Candy.  · Other sweets.  · Sodas, specialty coffee drinks, alcohol, and juice.  The items listed above may not be a complete list of foods and beverages you should avoid. Contact a dietitian for more information.  How do I count calories when eating out?  · Pay attention to portions. Often, portions are much larger when eating out. Try these tips to keep portions smaller:  ? Consider sharing a meal instead of getting your own.  ? If you get your own meal, eat only half of it. Before you start eating, ask for  a container and put half of your meal into it.  ? When available, consider ordering smaller portions from the menu instead of full portions.  · Pay attention to your food and drink choices. Knowing the way food is cooked and what is included with the meal can help you eat fewer calories.  ? If calories are listed on the menu, choose the lower-calorie options.  ? Choose dishes that include vegetables, fruits, whole grains, low-fat dairy products, and lean proteins.  ? Choose items that are boiled, broiled, grilled, or steamed. Avoid items that are buttered, battered, fried, or served with cream sauce. Items labeled as crispy are usually fried, unless stated otherwise.  ? Choose water, low-fat milk, unsweetened iced tea, or other drinks without added sugar. If you want an alcoholic beverage, choose a lower-calorie option, such as a glass of wine or light beer.  ? Ask for dressings, sauces, and syrups on the side. These are usually high in calories, so you should limit the amount you eat.  ? If you want a salad, choose a garden salad and ask for grilled meats. Avoid extra toppings such as brooks, cheese, or fried items. Ask for the dressing on the side, or ask for olive oil and vinegar or lemon to use as dressing.  · Estimate how many servings of a food you are given. Knowing serving sizes will help you be aware of how much food you are eating at restaurants.  Where to find more information  · Centers for Disease Control and Prevention: www.cdc.gov  · [place] Department of Agriculture: myplate.gov  Summary  · Calorie counting means keeping track of how many calories you eat and drink each day. If you eat fewer calories than your body needs, you should lose weight.  · A healthy amount of weight to lose per week is usually 1-2 lb (0.5-0.9 kg). This usually means reducing your daily calorie intake by 500-750 calories.  · The number of calories in a food can be found on a Nutrition Facts label. If a food does not have a  Nutrition Facts label, try to look up the calories online or ask your dietitian for help.  · Use smaller plates, glasses, and bowls for smaller portions and to prevent overeating.  · Use your calories on foods and drinks that will fill you up and not leave you hungry shortly after a meal.  This information is not intended to replace advice given to you by your health care provider. Make sure you discuss any questions you have with your health care provider.  Document Revised: 01/28/2021 Document Reviewed: 01/28/2021  [employer] Patient Education © 2021 [employer]  BMI for Adults  What is BMI?  Body mass index (BMI) is a number that is calculated from a person's weight and height. BMI can help estimate how much of a person's weight is composed of fat. BMI does not measure body fat directly. Rather, it is an alternative to procedures that directly measure body fat, which can be difficult and expensive.  BMI can help identify people who may be at higher risk for certain medical problems.  What are BMI measurements used for?  BMI is used as a screening tool to identify possible weight problems. It helps determine whether a person is obese, overweight, a healthy weight, or underweight.  BMI is useful for:  · Identifying a weight problem that may be related to a medical condition or may increase the risk for medical problems.  · Promoting changes, such as changes in diet and exercise, to help reach a healthy weight. BMI screening can be repeated to see if these changes are working.  How is BMI calculated?  BMI involves measuring your weight in relation to your height. Both height and weight are measured, and the BMI is calculated from those numbers. This can be done either in English ([place] or metric measurements. Note that charts and online BMI calculators are available to help you find your BMI quickly and easily without having to do these calculations yourself.  To calculate your BMI in English ([place]  "measurements:    1. Measure your weight in pounds (lb).  2. Multiply the number of pounds by 703.  ? For example, for a person who weighs 180 lb, multiply that number by 703, which equals 126,540.  3. Measure your height in inches. Then multiply that number by itself to get a measurement called \"inches squared.\"  ? For example, for a person who is 70 inches tall, the \"inches squared\" measurement is 70 inches x 70 inches, which equals 4,900 inches squared.  4. Divide the total from step 2 (number of lb x 703) by the total from step 3 (inches squared): 126,540 ÷ 4,900 = 25.8. This is your BMI.    To calculate your BMI in metric measurements:  1. Measure your weight in kilograms (kg).  2. Measure your height in meters (m). Then multiply that number by itself to get a measurement called \"meters squared.\"  ? For example, for a person who is 1.75 m tall, the \"meters squared\" measurement is 1.75 m x 1.75 m, which is equal to 3.1 meters squared.  3. Divide the number of kilograms (your weight) by the meters squared number. In this example: 70 ÷ 3.1 = 22.6. This is your BMI.  What do the results mean?  BMI charts are used to identify whether you are underweight, normal weight, overweight, or obese. The following guidelines will be used:  · Underweight: BMI less than 18.5.  · Normal weight: BMI between 18.5 and 24.9.  · Overweight: BMI between 25 and 29.9.  · Obese: BMI of 30 or above.  Keep these notes in mind:  · Weight includes both fat and muscle, so someone with a muscular build, such as an athlete, may have a BMI that is higher than 24.9. In cases like these, BMI is not an accurate measure of body fat.  · To determine if excess body fat is the cause of a BMI of 25 or higher, further assessments may need to be done by a health care provider.  · BMI is usually interpreted in the same way for men and women.  Where to find more information  For more information about BMI, including tools to quickly calculate your BMI, go to " these websites:  · Centers for Disease Control and Prevention: www.cdc.gov  · American Heart Association: www.heart.org  · National Heart, Lung, and Blood [place]: www.nhlbi.nih.gov  Summary  · Body mass index (BMI) is a number that is calculated from a person's weight and height.  · BMI may help estimate how much of a person's weight is composed of fat. BMI can help identify those who may be at higher risk for certain medical problems.  · BMI can be measured using English measurements or metric measurements.  · BMI charts are used to identify whether you are underweight, normal weight, overweight, or obese.  This information is not intended to replace advice given to you by your health care provider. Make sure you discuss any questions you have with your health care provider.  Document Revised: 09/09/2020 Document Reviewed: 07/17/2020  Elsevier Patient Education © 2021 [employer] Inc.

## 2022-03-01 ENCOUNTER — APPOINTMENT (OUTPATIENT)
Dept: WOMENS IMAGING | Facility: HOSPITAL | Age: 52
End: 2022-03-01

## 2022-03-01 PROCEDURE — 77067 SCR MAMMO BI INCL CAD: CPT | Performed by: RADIOLOGY

## 2022-03-01 PROCEDURE — 77063 BREAST TOMOSYNTHESIS BI: CPT | Performed by: RADIOLOGY

## 2022-05-10 ENCOUNTER — TELEMEDICINE (OUTPATIENT)
Dept: INTERNAL MEDICINE | Facility: CLINIC | Age: 52
End: 2022-05-10

## 2022-05-10 DIAGNOSIS — J40 BRONCHITIS: Primary | ICD-10-CM

## 2022-05-10 DIAGNOSIS — J45.20 MILD INTERMITTENT ASTHMA WITHOUT COMPLICATION: ICD-10-CM

## 2022-05-10 PROCEDURE — 99213 OFFICE O/P EST LOW 20 MIN: CPT | Performed by: INTERNAL MEDICINE

## 2022-05-10 RX ORDER — ALBUTEROL SULFATE 90 UG/1
2 AEROSOL, METERED RESPIRATORY (INHALATION) EVERY 4 HOURS PRN
Qty: 18 G | Refills: 1 | Status: SHIPPED | OUTPATIENT
Start: 2022-05-10 | End: 2022-09-16 | Stop reason: SDUPTHER

## 2022-05-10 RX ORDER — AMOXICILLIN AND CLAVULANATE POTASSIUM 875; 125 MG/1; MG/1
1 TABLET, FILM COATED ORAL EVERY 12 HOURS SCHEDULED
Qty: 20 TABLET | Refills: 0 | Status: SHIPPED | OUTPATIENT
Start: 2022-05-10 | End: 2022-05-10

## 2022-05-10 RX ORDER — CEFDINIR 300 MG/1
300 CAPSULE ORAL 2 TIMES DAILY
Qty: 20 CAPSULE | Refills: 0 | Status: SHIPPED | OUTPATIENT
Start: 2022-05-10 | End: 2022-11-30

## 2022-05-10 NOTE — PROGRESS NOTES
Subjective   Aleena Wilder is a 51 y.o. female.     Chief Complaint   Patient presents with   • Cough         Cough  This is a new problem. The cough is productive of sputum. Associated symptoms include postnasal drip and a sore throat. Pertinent negatives include no chills, fever or shortness of breath.        The following portions of the patient's history were reviewed and updated as appropriate: allergies, current medications, past social history and problem list.    Outpatient Medications Marked as Taking for the 5/10/22 encounter (Telemedicine) with Felix Quan MD   Medication Sig Dispense Refill   • Cholecalciferol (Vitamin D3) 50 MCG (2000 UT) tablet Take 2,000 Units by mouth Daily.     • fexofenadine (ALLEGRA) 180 MG tablet Take 180 mg by mouth Daily.     • fluticasone (FLONASE) 50 MCG/ACT nasal spray 2 sprays into each nostril Daily.     • Multiple Vitamins-Minerals (MULTIVITAMIN PO) Take 1 tablet by mouth Daily.     • [DISCONTINUED] aspirin 81 MG chewable tablet Chew 1 tablet Daily.         Review of Systems   Constitutional: Negative for chills and fever.   HENT: Positive for postnasal drip and sore throat.    Respiratory: Positive for cough. Negative for shortness of breath.        Objective   There were no vitals filed for this visit.   Wt Readings from Last 3 Encounters:   02/16/22 57.6 kg (127 lb)   01/10/22 54.8 kg (120 lb 14.4 oz)   01/01/22 54.4 kg (120 lb)    There is no height or weight on file to calculate BMI.      Physical Exam  Constitutional:       Appearance: Normal appearance.   Pulmonary:      Effort: Pulmonary effort is normal.   Neurological:      Mental Status: She is alert.   Psychiatric:         Mood and Affect: Mood normal.         Behavior: Behavior normal.         Thought Content: Thought content normal.         Judgment: Judgment normal.           Problems Addressed this Visit        Pulmonary and Pneumonias    Mild intermittent asthma without complication       Other Visit Diagnoses     Bronchitis    -  Primary      Diagnoses       Codes Comments    Bronchitis    -  Primary ICD-10-CM: J40  ICD-9-CM: 490     Mild intermittent asthma without complication     ICD-10-CM: J45.20  ICD-9-CM: 493.90         ASSESSMENT/PLAN/BEGIN  Video visit due to COVID pandemic.  She has had a 3-day illness . cough.  She has congestion . postnasal drip.  Minimal throat irritation from the postnasal drip.  She has had a heaviness in her chest like his hard to breathe at times.  She has a history of mild intermittent asthma in the past and I suspect that is the case now.  We will begin Omnicef 300 mg twice daily for 10 days.  Add a Proventil inhaler for as needed use.  [employer] used for the platform today.      The above information was reviewed again today 05/10/22.  It continues to be accurate as reflected above and is unchanged.  History, physical and review of systems all reviewed and are unchanged.  Medications were reviewed today and continue the current dosing.    PPE today includes face mask and eye shield.             Dragon disclaimer:   Much of this encounter note is an electronic transcription/translation of spoken language to printed text. The electronic translation of spoken language may permit erroneous, or at times, nonsensical words or phrases to be inadvertently transcribed; Although I have reviewed the note for such errors, some may still exist.

## 2022-06-07 ENCOUNTER — OFFICE VISIT (OUTPATIENT)
Dept: SLEEP MEDICINE | Facility: HOSPITAL | Age: 52
End: 2022-06-07

## 2022-06-07 VITALS
HEIGHT: 62 IN | BODY MASS INDEX: 22.38 KG/M2 | DIASTOLIC BLOOD PRESSURE: 64 MMHG | HEART RATE: 76 BPM | WEIGHT: 121.6 LBS | SYSTOLIC BLOOD PRESSURE: 93 MMHG | OXYGEN SATURATION: 98 %

## 2022-06-07 DIAGNOSIS — G47.10 HYPERSOMNIA: ICD-10-CM

## 2022-06-07 DIAGNOSIS — G47.33 OSA (OBSTRUCTIVE SLEEP APNEA): Primary | ICD-10-CM

## 2022-06-07 PROCEDURE — G0463 HOSPITAL OUTPT CLINIC VISIT: HCPCS

## 2022-06-07 NOTE — PROGRESS NOTES
"Sleep Medicine Follow-up visit Note    Name: Aleena Wilder  Age: 51 y.o.  Sex: female  :  1970  MRN: [id number]  Date of Service: 2022    Requesting Physician: Dewayne Angela Md  [address] [place] [id number]  .       History of Present Illness:   Aleena Wilder is a 51 y.o. female comes for a follow up visit.     The patient has Moderately obstructive sleep apnea. Patients Polysomnography has revealed RENETTA with an AHI of 19.2 per hour of sleep. minimum SPO2  was 84 %. [place] Sleepiness Scale score prior to CPAP therapy was 13/24.    The patient will generally had [employer] dream station auto CPAP which is on recall by the  and issued to her in 2018.  She is eligible for a new replacement machine in 2023.      She got a replacement DreamStation auto CPAP from the [employer]  and the compliance data indicate excellent compliance with 93.3% usage for more than 4 hours with an average usage of 6 hours and 46 minutes and auto CPAP mean pressure 7.6 cm of water and maximum pressure 9.8 cm of water and residual AHI of 2.8/h of sleep.  [place] Sleepiness Scale score with CPAP therapy is 1124.    Review of Systems - Negative except anxiety.    PMH, Surgical Hx, current Medications, Allergies, Family Hx, Social Hx and problem list were reviewed and updated in the chart.    Objective:  Vitals:    22 1100   BP: 93/64   Pulse: 76   SpO2: 98%   Weight: 55.2 kg (121 lb 9.6 oz)   Height: 157.5 cm (62\")    Body mass index is 22.24 kg/m².       GEN: No significant distress, the patient is well developed, well nourished.  NEURO: alert and oriented x 3,    The patient's polysomnography study in 2018 revealed:1. Moderately  severe Obstructive sleep apnea with an Apnea hypopnea index of 19.2 events per sleep hour. The lowest oxygen saturation was 84.0%. The oxygen was below 90% for 2.7% total sleep time. 2. With CPAP / " BIPAP treatment, this study revealed improvement in sleep architecture, respiratory events and hypoxia and the best improvement was seen at a pressure of 8 centimeters of water.  At this pressure the Apnea/ hypopnea Index was 0.8 per sleep hour and the Oxygen Saturation remained 93.4% or above.     Assessment and PLAN:   The patient has moderately severe obstructive sleep apnea syndrome and is on CPAP. The patient is compliant and is benefiting from it.  I will continue present CPAP therapy and will see the patient for follow-up in one year.    The patient's original CPAP machine was issued to her in March 2018 and she is eligible for a 5-year replacement in March 2023.     I have discussed the findings of my evaluation with the patient at length, instructed the patient on basic sleep hygiene, stressing the importance of regular sleep schedule without naps and with 8 hours of sleep per night, avoiding alcohol and sedative medications, limiting caffeine consumption, and implementing a healthy diet and exercise program and not to drive if patient is sleepy.       Dewayne Angela MD  6/7/2022  11:55 EDT

## 2022-09-16 ENCOUNTER — TELEPHONE (OUTPATIENT)
Dept: INTERNAL MEDICINE | Facility: CLINIC | Age: 52
End: 2022-09-16

## 2022-09-16 RX ORDER — ALBUTEROL SULFATE 90 UG/1
2 AEROSOL, METERED RESPIRATORY (INHALATION) EVERY 4 HOURS PRN
Qty: 18 G | Refills: 1 | Status: SHIPPED | OUTPATIENT
Start: 2022-09-16

## 2022-09-16 NOTE — TELEPHONE ENCOUNTER
Caller: Aleena Wilder    Relationship: Self    Best call back number: [phone]    Requested Prescriptions:    [facility]/pharmacy #[id number] - [place], [place] [phone] [phone] [phone]    Pharmacy where request should be sent:    [facility]/pharmacy #[id number] - [place], [place] - [address] [place] [phone] [facility] [phone] [phone]    Additional details provided by patient: PATIENT IS CALLING TO STATE SHE IS IN [place] AND LEFT HER INHALER AT HOME.  SHE IS WANTING TO KNOW IF DR. HESS WOULD CALL IN A PRESCRIPTION TO THE ABOVE [facility].  SHE STATES SHE WILL BE HIKING AND WILL NEED ASAP IF POSSIBLE.  Does the patient have less than a 3 day supply:  [x] Yes  [] No    Alexey See Rep   09/16/22 14:42 EDT     PLEASE ADVISE.

## 2022-09-29 ENCOUNTER — TELEMEDICINE (OUTPATIENT)
Dept: INTERNAL MEDICINE | Facility: CLINIC | Age: 52
End: 2022-09-29

## 2022-09-29 DIAGNOSIS — J40 BRONCHITIS: Primary | ICD-10-CM

## 2022-09-29 PROCEDURE — 99213 OFFICE O/P EST LOW 20 MIN: CPT | Performed by: INTERNAL MEDICINE

## 2022-09-29 RX ORDER — METHYLPREDNISOLONE 4 MG/1
TABLET ORAL
Qty: 1 EACH | Refills: 0 | Status: SHIPPED | OUTPATIENT
Start: 2022-09-29 | End: 2022-11-30

## 2022-09-29 NOTE — PROGRESS NOTES
Subjective   Aleena Wilder is a 52 y.o. female.     Chief Complaint   Patient presents with   • Nasal Drainage    • Cough   • Headache   • Sore Throat         Cough  Associated symptoms include headaches, postnasal drip, a sore throat and shortness of breath. Pertinent negatives include no chills or fever.   Headache  Sore Throat   Associated symptoms include coughing, headaches and shortness of breath.        The following portions of the patient's history were reviewed and updated as appropriate: allergies, current medications, past social history and problem list.    Outpatient Medications Marked as Taking for the 9/29/22 encounter (Telemedicine) with Felix Quan MD   Medication Sig Dispense Refill   • albuterol sulfate  (90 Base) MCG/ACT inhaler Inhale 2 puffs Every 4 (Four) Hours As Needed for Wheezing. 18 g 1   • Cholecalciferol (Vitamin D3) 50 MCG (2000 UT) tablet Take 2,000 Units by mouth Daily.     • fexofenadine (ALLEGRA) 180 MG tablet Take 180 mg by mouth Daily.     • fluticasone (FLONASE) 50 MCG/ACT nasal spray 2 sprays into each nostril Daily.     • Multiple Vitamins-Minerals (MULTIVITAMIN PO) Take 1 tablet by mouth Daily.         Review of Systems   Constitutional: Negative for chills and fever.   HENT: Positive for postnasal drip and sore throat.    Respiratory: Positive for cough and shortness of breath.    Neurological: Positive for headaches.       Objective   There were no vitals filed for this visit.   Wt Readings from Last 3 Encounters:   06/07/22 55.2 kg (121 lb 9.6 oz)   02/16/22 57.6 kg (127 lb)   01/10/22 54.8 kg (120 lb 14.4 oz)    There is no height or weight on file to calculate BMI.      Physical Exam  Constitutional:       Appearance: Normal appearance.   Pulmonary:      Effort: Pulmonary effort is normal.   Neurological:      Mental Status: She is alert.   Psychiatric:         Mood and Affect: Mood normal.         Behavior: Behavior normal.         Thought  Content: Thought content normal.         Judgment: Judgment normal.           Problems Addressed this Visit    None     Visit Diagnoses     Bronchitis    -  Primary      Diagnoses       Codes Comments    Bronchitis    -  Primary ICD-10-CM: J40  ICD-9-CM: 490         Assessment & Plan   Video visit today due to COVID pandemic.  8 days ago she began with head congestion and postnasal drip.  Sore throat.  Some cough.  She is having some shortness of breath.  Chronic allergy issues.  She is taking a number of allergy medications including Flonase and Allegra and over-the-counter Mucinex and now Robitussin-DM.  We will add a Medrol Dosepak.  If symptoms are not improving significantly in the next 6 days we will add an antibiotic, likely Omnicef 300 mg twice daily for 1 week.  Tour Engine platform used for the visit today.    The above information was reviewed again today 09/29/22.  It continues to be accurate as reflected above and is unchanged.  History, physical and review of systems all reviewed and are unchanged.  Medications were reviewed today and continue the current dosing.               Jarad disclaimer:   Much of this encounter note is an electronic transcription/translation of spoken language to printed text. The electronic translation of spoken language may permit erroneous, or at times, nonsensical words or phrases to be inadvertently transcribed; Although I have reviewed the note for such errors, some may still exist.

## 2022-09-29 NOTE — PROGRESS NOTES
Mode of Visit: Video  Location of patient: home  Location of provider: [facility] clinic  You have chosen to receive care through a telehealth visit.  Does the patient consent to use a video/audio connection for your medical care today? Yes  The visit included audio and video interaction. No technical issues occurred during this visit.

## 2022-11-30 ENCOUNTER — OFFICE VISIT (OUTPATIENT)
Dept: INTERNAL MEDICINE | Facility: CLINIC | Age: 52
End: 2022-11-30

## 2022-11-30 VITALS
BODY MASS INDEX: 23.19 KG/M2 | HEART RATE: 89 BPM | OXYGEN SATURATION: 98 % | SYSTOLIC BLOOD PRESSURE: 112 MMHG | WEIGHT: 126 LBS | HEIGHT: 62 IN | DIASTOLIC BLOOD PRESSURE: 64 MMHG | TEMPERATURE: 98.7 F | RESPIRATION RATE: 16 BRPM

## 2022-11-30 DIAGNOSIS — R07.82 INTERCOSTAL PAIN: Primary | ICD-10-CM

## 2022-11-30 DIAGNOSIS — M75.52 BURSITIS OF LEFT SHOULDER: ICD-10-CM

## 2022-11-30 PROCEDURE — 99213 OFFICE O/P EST LOW 20 MIN: CPT | Performed by: INTERNAL MEDICINE

## 2022-11-30 RX ORDER — METHYLPREDNISOLONE 4 MG/1
TABLET ORAL
Qty: 1 EACH | Refills: 0 | Status: SHIPPED | OUTPATIENT
Start: 2022-11-30

## 2022-11-30 RX ORDER — BIOTIN 5 MG
TABLET ORAL
COMMUNITY

## 2022-11-30 NOTE — PROGRESS NOTES
Subjective   Aleena Wilder is a 52 y.o. female.     Chief Complaint   Patient presents with   • Arm Pain     Pain score 3, but with movement it's 7-8   • Arm Injury     Flu shot Nov 2   • Flank Pain     Left side pain since July 2022         Arm Pain   The incident occurred more than 1 week ago. There was no injury mechanism.   Flank Pain  This is a chronic problem. The current episode started more than 1 month ago. The problem has been gradually improving since onset. Pertinent negatives include no fever.        The following portions of the patient's history were reviewed and updated as appropriate: allergies, current medications, past social history and problem list.    Outpatient Medications Marked as Taking for the 11/30/22 encounter (Office Visit) with Felix Quan MD   Medication Sig Dispense Refill   • albuterol sulfate  (90 Base) MCG/ACT inhaler Inhale 2 puffs Every 4 (Four) Hours As Needed for Wheezing. 18 g 1   • ALLERGY SERUM INJECTION Inject 1 mL under the skin into the appropriate area as directed 1 (One) Time Per Week.     • Cholecalciferol (Vitamin D3) 50 MCG (2000 UT) tablet Take 2,000 Units by mouth Daily.     • Collagen-Vitamin C-Biotin (COLLAGEN 1500/C PO) Take  by mouth. Collagen Peptides     • fexofenadine (ALLEGRA) 180 MG tablet Take 180 mg by mouth Daily.     • fluticasone (FLONASE) 50 MCG/ACT nasal spray 2 sprays into each nostril Daily.     • Krill Oil 1000 MG capsule Take  by mouth.     • Multiple Vitamins-Minerals (MULTIVITAMIN PO) Take 1 tablet by mouth Daily.     • Probiotic Product (PROBIOTIC-10 PO) Take  by mouth.         Review of Systems   Constitutional: Negative for chills and fever.   Respiratory: Negative for cough and shortness of breath.    Genitourinary: Positive for flank pain.   Musculoskeletal: Positive for myalgias.       Objective   Vitals:    11/30/22 1138   BP: 112/64   Pulse: 89   Resp: 16   Temp: 98.7 °F (37.1 °C)   SpO2: 98%      Wt Readings  from Last 3 Encounters:   11/30/22 57.2 kg (126 lb)   06/07/22 55.2 kg (121 lb 9.6 oz)   02/16/22 57.6 kg (127 lb)    Body mass index is 23.04 kg/m².      Physical Exam  Constitutional:       Appearance: Normal appearance.   Pulmonary:      Effort: No respiratory distress.      Breath sounds: Normal breath sounds. No wheezing or rales.   Musculoskeletal:         General: Tenderness present.   Neurological:      Mental Status: She is alert.           Problems Addressed this Visit    None  Visit Diagnoses     Intercostal pain    -  Primary      Diagnoses       Codes Comments    Intercostal pain    -  Primary ICD-10-CM: R07.82  ICD-9-CM: 786.59         Assessment & Plan   In today with 2 separate problems.  1 of also left shoulder.  She has had pain for about 1 month following a flu shot in the left shoulder.  It sounds like the shot may have been a tad high.  Regardless she has been having pain ever since.  She is got decreased motion on exam today and some mild tenderness right over the subdeltoid bursa I suspect she is got some bursitis and perhaps some adhesive capsulitis secondary to disuse.  We will begin with some physical therapy and a Medrol Dosepak.  The second problem involves some pain in the left lateral rib cage about the eighth rib.  That began in July 2022.  It is just a dull ache.  Minimally worse with inspiration.  Not a lot of tenderness.  Nonetheless the pain seems to be intercostal related.  She is quite concerned about the possibility of lung cancer since she is got a CPAP machine that has been recalled and is now old.  We will see how her pain does with the steroid Dosepak and consider a chest x-ray followed by a CT of the chest without contrast if she is not any better.    The above information was reviewed again today 11/30/22.  It continues to be accurate as reflected above and is unchanged.  History, physical and review of systems all reviewed and are unchanged.  Medications were reviewed  today and continue the current dosing.    PPE today includes face mask and eye shield.               Dragon disclaimer:   Part of this note may be an electronic transcription/translation of spoken language to printed text using the Dragon Dictation System.

## 2023-04-10 ENCOUNTER — OFFICE VISIT (OUTPATIENT)
Dept: INTERNAL MEDICINE | Facility: CLINIC | Age: 53
End: 2023-04-10
Payer: COMMERCIAL

## 2023-04-10 VITALS
DIASTOLIC BLOOD PRESSURE: 70 MMHG | HEART RATE: 87 BPM | RESPIRATION RATE: 16 BRPM | TEMPERATURE: 94.1 F | BODY MASS INDEX: 23.85 KG/M2 | SYSTOLIC BLOOD PRESSURE: 105 MMHG | HEIGHT: 62 IN | WEIGHT: 129.6 LBS | OXYGEN SATURATION: 97 %

## 2023-04-10 DIAGNOSIS — R73.02 IGT (IMPAIRED GLUCOSE TOLERANCE): Chronic | ICD-10-CM

## 2023-04-10 DIAGNOSIS — E55.9 VITAMIN D DEFICIENCY: ICD-10-CM

## 2023-04-10 DIAGNOSIS — Z00.00 ENCOUNTER FOR PREVENTIVE HEALTH EXAMINATION: Primary | ICD-10-CM

## 2023-04-10 PROBLEM — M81.8 OTHER OSTEOPOROSIS WITHOUT CURRENT PATHOLOGICAL FRACTURE: Status: ACTIVE | Noted: 2023-04-10

## 2023-04-10 PROBLEM — M81.8 OTHER OSTEOPOROSIS WITHOUT CURRENT PATHOLOGICAL FRACTURE: Chronic | Status: ACTIVE | Noted: 2023-04-10

## 2023-04-10 PROCEDURE — 99396 PREV VISIT EST AGE 40-64: CPT | Performed by: INTERNAL MEDICINE

## 2023-04-10 RX ORDER — PHENOL 1.4 %
600 AEROSOL, SPRAY (ML) MUCOUS MEMBRANE DAILY
COMMUNITY

## 2023-04-10 RX ORDER — IBANDRONATE SODIUM 150 MG/1
150 TABLET, FILM COATED ORAL
Qty: 4 TABLET | Refills: 3
Start: 2023-04-10
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
## 2023-04-27 ENCOUNTER — TELEPHONE (OUTPATIENT)
Dept: INTERNAL MEDICINE | Facility: CLINIC | Age: 53
End: 2023-04-27

## 2023-04-27 ENCOUNTER — TELEMEDICINE (OUTPATIENT)
Dept: INTERNAL MEDICINE | Facility: CLINIC | Age: 53
End: 2023-04-27
Payer: COMMERCIAL

## 2023-04-27 DIAGNOSIS — J30.9 ALLERGIC RHINITIS, UNSPECIFIED SEASONALITY, UNSPECIFIED TRIGGER: Primary | ICD-10-CM

## 2023-04-27 DIAGNOSIS — J40 BRONCHITIS: ICD-10-CM

## 2023-04-27 PROCEDURE — 99213 OFFICE O/P EST LOW 20 MIN: CPT | Performed by: INTERNAL MEDICINE

## 2023-04-27 RX ORDER — METHYLPREDNISOLONE 4 MG/1
TABLET ORAL
Qty: 1 EACH | Refills: 0 | Status: SHIPPED | OUTPATIENT
Start: 2023-04-27

## 2023-04-27 RX ORDER — FLUTICASONE FUROATE AND VILANTEROL 200; 25 UG/1; UG/1
1 POWDER RESPIRATORY (INHALATION)
Qty: 60 EACH | Refills: 5 | Status: SHIPPED | OUTPATIENT
Start: 2023-04-27

## 2023-04-27 NOTE — TELEPHONE ENCOUNTER
Pt informed that she has to call plan to verify what is covered. Advised to return call with alternatives so Dr Quan can send in a script. Verbalized understanding.

## 2023-04-27 NOTE — TELEPHONE ENCOUNTER
[facility] request alternative to Breo Ell[facility]a due to insurance not covering. Alternatives not suggested. Please advise on alternative.

## 2023-04-27 NOTE — PROGRESS NOTES
Subjective   Aleena Wilder is a 52 y.o. female.     Chief Complaint   Patient presents with   • Cough   • Congestion    • Headache     No Fever            Cough  This is a new problem. The current episode started in the past 7 days. The problem occurs constantly. The cough is productive of sputum. Associated symptoms include headaches, postnasal drip, rhinorrhea, shortness of breath and wheezing. Pertinent negatives include no chills or fever.   Headache       The following portions of the patient's history were reviewed and updated as appropriate: allergies, current medications, past social history and problem list.    Outpatient Medications Marked as Taking for the 4/27/23 encounter (Telemedicine) with Felix Quan MD   Medication Sig Dispense Refill   • albuterol sulfate  (90 Base) MCG/ACT inhaler Inhale 2 puffs Every 4 (Four) Hours As Needed for Wheezing. 18 g 1   • ALLERGY SERUM INJECTION Inject 1 mL under the skin into the appropriate area as directed 1 (One) Time Per Week.     • calcium carbonate (OS-RHODA) 600 MG tablet Take 1 tablet by mouth Daily.     • Cholecalciferol (Vitamin D3) 50 MCG (2000 UT) tablet Take 1 tablet by mouth Daily.     • Collagen-Vitamin C-Biotin (COLLAGEN 1500/C PO) Take  by mouth. Collagen Peptides     • fexofenadine (ALLEGRA) 180 MG tablet Take 1 tablet by mouth Daily.     • fluticasone (FLONASE) 50 MCG/ACT nasal spray 2 sprays into the nostril(s) as directed by provider Daily.     • ibandronate (Boniva) 150 MG tablet Take 1 tablet by mouth Every 30 (Thirty) Days. 4 tablet 3   • Krill Oil 1000 MG capsule Take  by mouth.     • Multiple Vitamins-Minerals (MULTIVITAMIN PO) Take 1 tablet by mouth Daily.     • Probiotic Product (PROBIOTIC-10 PO) Take  by mouth.         Review of Systems   Constitutional: Negative for chills and fever.   HENT: Positive for postnasal drip and rhinorrhea.    Respiratory: Positive for cough, shortness of breath and wheezing.     Neurological: Positive for headaches.       Objective   There were no vitals filed for this visit.   Wt Readings from Last 3 Encounters:   04/10/23 58.8 kg (129 lb 9.6 oz)   11/30/22 57.2 kg (126 lb)   06/07/22 55.2 kg (121 lb 9.6 oz)    There is no height or weight on file to calculate BMI.      Physical Exam  Constitutional:       Appearance: Normal appearance.   Pulmonary:      Effort: Pulmonary effort is normal.   Neurological:      Mental Status: She is alert.   Psychiatric:         Mood and Affect: Mood normal.         Behavior: Behavior normal.         Thought Content: Thought content normal.         Judgment: Judgment normal.           Problems Addressed this Visit        Allergies and Adverse Reactions    Allergic rhinitis - Primary   Other Visit Diagnoses     Bronchitis          Diagnoses       Codes Comments    Allergic rhinitis, unspecified seasonality, unspecified trigger    -  Primary ICD-10-CM: J30.9  ICD-9-CM: 477.9     Bronchitis     ICD-10-CM: J40  ICD-9-CM: 490         Assessment & Plan   In with an 8 day illness.  Head congestion and postnasal drip.  Cough with sputum production.  She has a long history of allergies and is back on allergy shots in the past 3 months.  Inconsistent in the past.  Her inhaler is albuterol.  She really needs to be on the combination inhaler.  She has year-long symptoms.  She might require the addition of Singulair as well and could even try a triple inhaler at some point if need be.  We will start her on Breo 1 puff daily today.  Medrol Dosepak.  Event[employer] platform used for the visit today.    The above information was reviewed again today 04/27/23.  It continues to be accurate as reflected above and is unchanged.  History, physical and review of systems all reviewed and are unchanged.  Medications were reviewed today and continue the current dosing.         Dragon disclaimer:   Part of this note may be an electronic transcription/translation of spoken language to  printed text using the Dragon Dictation System.

## 2023-04-27 NOTE — PROGRESS NOTES
Mode of Visit: Video  Location of patient: home  [place] [facility]  You have chosen to receive care through a telehealth visit.  Does the patient consent to use a video/audio connection for your medical care today?Yes  The visit included audio and video interaction. No technical issues occurred during this visit.

## 2023-06-12 ENCOUNTER — OFFICE VISIT (OUTPATIENT)
Dept: SLEEP MEDICINE | Facility: HOSPITAL | Age: 53
End: 2023-06-12
Payer: COMMERCIAL

## 2023-06-12 VITALS
SYSTOLIC BLOOD PRESSURE: 94 MMHG | HEART RATE: 68 BPM | HEIGHT: 62 IN | BODY MASS INDEX: 22.82 KG/M2 | DIASTOLIC BLOOD PRESSURE: 68 MMHG | OXYGEN SATURATION: 99 % | WEIGHT: 124 LBS

## 2023-06-12 DIAGNOSIS — G47.33 OSA (OBSTRUCTIVE SLEEP APNEA): Primary | ICD-10-CM

## 2023-06-12 PROCEDURE — G0463 HOSPITAL OUTPT CLINIC VISIT: HCPCS

## 2023-06-12 NOTE — PROGRESS NOTES
[facility]  Telephone: [phone] / Fax: [phone] [place]  Telephone: [phone] / Fax: [phone] Rachel Baldwin    PCP: Felix Quan MD    Reason for visit: RENETTA f/u    Aleena Wilder is a 52 y.o.female  was last seen at [facility] sleep lab in June 2022. Dr Angela former pt. Here to get established with new provider. We don't have any data from the new Tred machine to review. She could not find her old smart card, needs new one. Her sleep schedule is 11:30pm-7am. ESS is 12.    SH- no tobacco, 1-2 alc per week, 1-2 caffeine, energy drinks.    ROS +nasal congestion/allergies.    DME old company [employer]-will need to transition to [employer].    Current Medications:    Current Outpatient Medications:   •  albuterol sulfate  (90 Base) MCG/ACT inhaler, Inhale 2 puffs Every 4 (Four) Hours As Needed for Wheezing., Disp: 18 g, Rfl: 1  •  ALLERGY SERUM INJECTION, Inject 1 mL under the skin into the appropriate area as directed 1 (One) Time Per Week., Disp: , Rfl:   •  calcium carbonate (OS-RHODA) 600 MG tablet, Take 1 tablet by mouth Daily., Disp: , Rfl:   •  Cholecalciferol (Vitamin D3) 50 MCG (2000 UT) tablet, Take 1 tablet by mouth Daily., Disp: , Rfl:   •  Collagen-Vitamin C-Biotin (COLLAGEN 1500/C PO), Take  by mouth. Collagen Peptides, Disp: , Rfl:   •  fexofenadine (ALLEGRA) 180 MG tablet, Take 1 tablet by mouth Daily., Disp: , Rfl:   •  fluticasone (FLONASE) 50 MCG/ACT nasal spray, 2 sprays into the nostril(s) as directed by provider Daily., Disp: , Rfl:   •  Fluticasone Furoate-Vilanterol (Breo Ellipta) 200-25 MCG/ACT inhaler, Inhale 1 puff Daily., Disp: 60 each, Rfl: 5  •  ibandronate (Boniva) 150 MG tablet, Take 1 tablet by mouth Every 30 (Thirty) Days., Disp: 4 tablet, Rfl: 3  •  Krill Oil 1000 MG capsule, Take  by mouth., Disp: , Rfl:   •  methylPREDNISolone (MEDROL) 4 MG dose pack, Take as directed on package instructions., Disp: 1 each, Rfl: 0  •  Multiple  "Vitamins-Minerals (MULTIVITAMIN PO), Take 1 tablet by mouth Daily., Disp: , Rfl:   •  Probiotic Product (PROBIOTIC-10 PO), Take  by mouth., Disp: , Rfl:    also entered in Sleep Questionnaire    Patient  has a past medical history of Anxiety, Endometriosis, History of panic attacks, Kidney stones, Renal cyst, and Sleep apnea.    I have reviewed the Past Medical History, Past Surgical History, Social History and Family History.    Vital Signs BP 94/68   Pulse 68   Ht 157.5 cm (62.01\")   Wt 56.2 kg (124 lb)   SpO2 99%   BMI 22.67 kg/m²  Body mass index is 22.67 kg/m².    General Alert and oriented. No acute distress noted   Pharynx/Throat Class IV  Mallampati airway, large tongue, no evidence of redundant lateral pharyngeal tissue. No oral lesions. No thrush. Moist mucous membranes.   Head Normocephalic. Symmetrical. Atraumatic.    Nose No septal deviation. No drainage   Chest Wall Normal shape. Symmetric expansion with respiration. No tenderness.   Neck Trachea midline, no thyromegaly or adenopathy    Lungs Clear to auscultation bilaterally. No wheezes. No rhonchi. No rales. Respirations regular, even and unlabored.   Heart Regular rhythm and normal rate. Normal S1 and S2. No murmur   Abdomen Soft, non-tender and non-distended. Normal bowel sounds. No masses.   Extremities Moves all extremities well. No edema   Psychiatric Normal mood and affect.     Testing:  · Download recent data available to me is 10/4/22-1/1/23, 97% use with avg nightly use of 7 hr and 7 min on auto CPAP 5-15, avg pr 10.3cm H2O, AHI 2.6/hr, leak 9 seconds    Study-Polysomnography has revealed RENETTA with an AHI of 19.2 per hour of sleep. minimum SPO2  was 84 %    Impression:  1. RENETTA (obstructive sleep apnea)          Plan:  Aleena is doing well on a new Bomgar 2 machine. I have no data from past 6 months to review as she misplaced her smart card. No data available on cloud either. I ordered a new smart card from [facility]. Once pt " receives it, she was asked to bring it to the sleep center for us to download. Appropriate adjustments will be made if needed. Patient uses the CPAP device and benefits from its use in terms of reduction of hypersomnia and snoring.  I will send order to DME to renew CPAP supplies.    Follow up with Dr. Benoit in one year    Thank you for allowing me to participate in your patient's care.      GOMEZ Martell  [facility]  Phone: [phone]      Part of this note may be an electronic transcription/translation of spoken language to printed text using the Dragon Dictation System.

## 2023-09-07 ENCOUNTER — HOSPITAL ENCOUNTER (OUTPATIENT)
Facility: HOSPITAL | Age: 53
Setting detail: OBSERVATION
Discharge: HOME | End: 2023-09-07
Attending: EMERGENCY MEDICINE | Admitting: INTERNAL MEDICINE
Payer: COMMERCIAL

## 2023-09-07 ENCOUNTER — APPOINTMENT (OUTPATIENT)
Dept: CARDIOLOGY | Facility: HOSPITAL | Age: 53
End: 2023-09-07
Payer: COMMERCIAL

## 2023-09-07 ENCOUNTER — APPOINTMENT (OUTPATIENT)
Dept: GENERAL RADIOLOGY | Facility: HOSPITAL | Age: 53
End: 2023-09-07
Payer: COMMERCIAL

## 2023-09-07 ENCOUNTER — READMISSION MANAGEMENT (OUTPATIENT)
Dept: CALL CENTER | Facility: HOSPITAL | Age: 53
End: 2023-09-07
Payer: COMMERCIAL

## 2023-09-07 VITALS
HEART RATE: 85 BPM | DIASTOLIC BLOOD PRESSURE: 73 MMHG | WEIGHT: 124 LBS | BODY MASS INDEX: 22.82 KG/M2 | HEIGHT: 62 IN | SYSTOLIC BLOOD PRESSURE: 110 MMHG | RESPIRATION RATE: 18 BRPM | OXYGEN SATURATION: 100 % | TEMPERATURE: 98.2 F

## 2023-09-07 DIAGNOSIS — R77.8 ELEVATED TROPONIN: ICD-10-CM

## 2023-09-07 DIAGNOSIS — R07.89 ATYPICAL CHEST PAIN: Primary | ICD-10-CM

## 2023-09-07 DIAGNOSIS — R06.00 DYSPNEA, UNSPECIFIED TYPE: ICD-10-CM

## 2023-09-07 PROBLEM — R79.89 ELEVATED TROPONIN LEVEL: Status: ACTIVE | Noted: 2023-09-07

## 2023-09-07 LAB
ALBUMIN SERPL-MCNC: 4.2 G/DL (ref 3.5–5.2)
ALBUMIN/GLOB SERPL: 1.6 G/DL
ALP SERPL-CCNC: 61 U/L (ref 39–117)
ALT SERPL W P-5'-P-CCNC: 19 U/L (ref 1–33)
ANION GAP SERPL CALCULATED.3IONS-SCNC: 12.3 MMOL/L (ref 5–15)
AORTIC DIMENSIONLESS INDEX: 0.9 (DI)
ASCENDING AORTA: 2.4 CM
AST SERPL-CCNC: 21 U/L (ref 1–32)
B PARAPERT DNA SPEC QL NAA+PROBE: NOT DETECTED
B PERT DNA SPEC QL NAA+PROBE: NOT DETECTED
BASOPHILS # BLD AUTO: 0.09 10*3/MM3 (ref 0–0.2)
BASOPHILS NFR BLD AUTO: 1.2 % (ref 0–1.5)
BH CV ECHO MEAS - ACS: 1.65 CM
BH CV ECHO MEAS - AO MAX PG: 6.2 MMHG
BH CV ECHO MEAS - AO MEAN PG: 3.6 MMHG
BH CV ECHO MEAS - AO ROOT DIAM: 2.5 CM
BH CV ECHO MEAS - AO V2 MAX: 124.9 CM/SEC
BH CV ECHO MEAS - AO V2 VTI: 25.3 CM
BH CV ECHO MEAS - AVA(I,D): 2.8 CM2
BH CV ECHO MEAS - EDV(CUBED): 34.6 ML
BH CV ECHO MEAS - EDV(MOD-SP2): 52 ML
BH CV ECHO MEAS - EDV(MOD-SP4): 58 ML
BH CV ECHO MEAS - EF(MOD-BP): 64 %
BH CV ECHO MEAS - EF(MOD-SP2): 65.4 %
BH CV ECHO MEAS - EF(MOD-SP4): 63.8 %
BH CV ECHO MEAS - ESV(CUBED): 13.1 ML
BH CV ECHO MEAS - ESV(MOD-SP2): 18 ML
BH CV ECHO MEAS - ESV(MOD-SP4): 21 ML
BH CV ECHO MEAS - FS: 27.7 %
BH CV ECHO MEAS - IVS/LVPW: 1.17 CM
BH CV ECHO MEAS - IVSD: 1.03 CM
BH CV ECHO MEAS - LAT PEAK E' VEL: 9.6 CM/SEC
BH CV ECHO MEAS - LV MASS(C)D: 86.6 GRAMS
BH CV ECHO MEAS - LV MAX PG: 6 MMHG
BH CV ECHO MEAS - LV MEAN PG: 2.5 MMHG
BH CV ECHO MEAS - LV V1 MAX: 122.7 CM/SEC
BH CV ECHO MEAS - LV V1 VTI: 23 CM
BH CV ECHO MEAS - LVIDD: 3.3 CM
BH CV ECHO MEAS - LVIDS: 2.36 CM
BH CV ECHO MEAS - LVOT AREA: 3.1 CM2
BH CV ECHO MEAS - LVOT DIAM: 1.98 CM
BH CV ECHO MEAS - LVPWD: 0.88 CM
BH CV ECHO MEAS - MED PEAK E' VEL: 9.4 CM/SEC
BH CV ECHO MEAS - MV A DUR: 0.12 SEC
BH CV ECHO MEAS - MV A MAX VEL: 86.1 CM/SEC
BH CV ECHO MEAS - MV DEC SLOPE: 414.6 CM/SEC2
BH CV ECHO MEAS - MV DEC TIME: 0.16 MSEC
BH CV ECHO MEAS - MV E MAX VEL: 66.8 CM/SEC
BH CV ECHO MEAS - MV E/A: 0.78
BH CV ECHO MEAS - MV MAX PG: 3.1 MMHG
BH CV ECHO MEAS - MV MEAN PG: 1.09 MMHG
BH CV ECHO MEAS - MV P1/2T: 63.1 MSEC
BH CV ECHO MEAS - MV V2 VTI: 27.3 CM
BH CV ECHO MEAS - MVA(P1/2T): 3.5 CM2
BH CV ECHO MEAS - MVA(VTI): 2.6 CM2
BH CV ECHO MEAS - PA ACC TIME: 0.2 SEC
BH CV ECHO MEAS - PA V2 MAX: 70.6 CM/SEC
BH CV ECHO MEAS - PULM A REVS DUR: 0.12 SEC
BH CV ECHO MEAS - PULM A REVS VEL: 38.6 CM/SEC
BH CV ECHO MEAS - PULM DIAS VEL: 47.1 CM/SEC
BH CV ECHO MEAS - PULM S/D: 1.46
BH CV ECHO MEAS - PULM SYS VEL: 69 CM/SEC
BH CV ECHO MEAS - RAP SYSTOLE: 3 MMHG
BH CV ECHO MEAS - RV MAX PG: 1.54 MMHG
BH CV ECHO MEAS - RV V1 MAX: 62.1 CM/SEC
BH CV ECHO MEAS - RV V1 VTI: 12.8 CM
BH CV ECHO MEAS - RVSP: 17 MMHG
BH CV ECHO MEAS - SV(LVOT): 70.9 ML
BH CV ECHO MEAS - SV(MOD-SP2): 34 ML
BH CV ECHO MEAS - SV(MOD-SP4): 37 ML
BH CV ECHO MEAS - TAPSE (>1.6): 2 CM
BH CV ECHO MEAS - TR MAX PG: 13.5 MMHG
BH CV ECHO MEAS - TR MAX VEL: 183.5 CM/SEC
BH CV ECHO MEASUREMENTS AVERAGE E/E' RATIO: 7.03
BH CV STRESS BP STAGE 1: NORMAL
BH CV STRESS BP STAGE 2: NORMAL
BH CV STRESS BP STAGE 3: NORMAL
BH CV STRESS BP STAGE 4: NORMAL
BH CV STRESS COMMENTS STAGE 1: NORMAL
BH CV STRESS DOSE REGADENOSON STAGE 1: 0.4
BH CV STRESS DURATION MIN STAGE 1: 3
BH CV STRESS DURATION MIN STAGE 2: 3
BH CV STRESS DURATION MIN STAGE 3: 3
BH CV STRESS DURATION MIN STAGE 4: 0
BH CV STRESS DURATION SEC STAGE 1: 0
BH CV STRESS DURATION SEC STAGE 2: 0
BH CV STRESS DURATION SEC STAGE 3: 0
BH CV STRESS DURATION SEC STAGE 4: 15
BH CV STRESS GRADE STAGE 1: 10
BH CV STRESS GRADE STAGE 2: 12
BH CV STRESS GRADE STAGE 3: 14
BH CV STRESS GRADE STAGE 4: 16
BH CV STRESS HR STAGE 1: 111
BH CV STRESS HR STAGE 2: 121
BH CV STRESS HR STAGE 3: 141
BH CV STRESS HR STAGE 4: 143
BH CV STRESS METS STAGE 1: 5
BH CV STRESS METS STAGE 2: 7.5
BH CV STRESS METS STAGE 3: 10
BH CV STRESS METS STAGE 4: 13.5
BH CV STRESS PROTOCOL 1: NORMAL
BH CV STRESS RECOVERY BP: NORMAL MMHG
BH CV STRESS RECOVERY HR: 96 BPM
BH CV STRESS SPEED STAGE 1: 1.7
BH CV STRESS SPEED STAGE 2: 2.5
BH CV STRESS SPEED STAGE 3: 3.4
BH CV STRESS SPEED STAGE 4: 4.2
BH CV STRESS STAGE 1: 1
BH CV STRESS STAGE 2: 2
BH CV STRESS STAGE 3: 3
BH CV STRESS STAGE 4: 4
BH CV XLRA - RV BASE: 2.7 CM
BH CV XLRA - RV LENGTH: 5.9 CM
BH CV XLRA - RV MID: 1.81 CM
BH CV XLRA - TDI S': 9.8 CM/SEC
BILIRUB SERPL-MCNC: 0.2 MG/DL (ref 0–1.2)
BUN SERPL-MCNC: 11 MG/DL (ref 6–20)
BUN/CREAT SERPL: 15.7 (ref 7–25)
C PNEUM DNA NPH QL NAA+NON-PROBE: NOT DETECTED
CALCIUM SPEC-SCNC: 9.7 MG/DL (ref 8.6–10.5)
CHLORIDE SERPL-SCNC: 106 MMOL/L (ref 98–107)
CO2 SERPL-SCNC: 23.7 MMOL/L (ref 22–29)
CREAT SERPL-MCNC: 0.7 MG/DL (ref 0.57–1)
CRP SERPL-MCNC: 0.45 MG/DL (ref 0–0.5)
D DIMER PPP FEU-MCNC: <0.27 MCGFEU/ML (ref 0–0.53)
DEPRECATED RDW RBC AUTO: 41.1 FL (ref 37–54)
EGFRCR SERPLBLD CKD-EPI 2021: 103.6 ML/MIN/1.73
EOSINOPHIL # BLD AUTO: 0.07 10*3/MM3 (ref 0–0.4)
EOSINOPHIL NFR BLD AUTO: 1 % (ref 0.3–6.2)
ERYTHROCYTE [DISTWIDTH] IN BLOOD BY AUTOMATED COUNT: 12.7 % (ref 12.3–15.4)
ERYTHROCYTE [SEDIMENTATION RATE] IN BLOOD: 6 MM/HR (ref 0–30)
FLUAV SUBTYP SPEC NAA+PROBE: NOT DETECTED
FLUBV RNA ISLT QL NAA+PROBE: NOT DETECTED
GEN 5 2HR TROPONIN T REFLEX: 12 NG/L
GLOBULIN UR ELPH-MCNC: 2.7 GM/DL
GLUCOSE SERPL-MCNC: 112 MG/DL (ref 65–99)
HADV DNA SPEC NAA+PROBE: NOT DETECTED
HCOV 229E RNA SPEC QL NAA+PROBE: NOT DETECTED
HCOV HKU1 RNA SPEC QL NAA+PROBE: NOT DETECTED
HCOV NL63 RNA SPEC QL NAA+PROBE: NOT DETECTED
HCOV OC43 RNA SPEC QL NAA+PROBE: NOT DETECTED
HCT VFR BLD AUTO: 38 % (ref 34–46.6)
HGB BLD-MCNC: 12.8 G/DL (ref 12–15.9)
HMPV RNA NPH QL NAA+NON-PROBE: NOT DETECTED
HPIV1 RNA ISLT QL NAA+PROBE: NOT DETECTED
HPIV2 RNA SPEC QL NAA+PROBE: NOT DETECTED
HPIV3 RNA NPH QL NAA+PROBE: NOT DETECTED
HPIV4 P GENE NPH QL NAA+PROBE: NOT DETECTED
IMM GRANULOCYTES # BLD AUTO: 0.03 10*3/MM3 (ref 0–0.05)
IMM GRANULOCYTES NFR BLD AUTO: 0.4 % (ref 0–0.5)
LYMPHOCYTES # BLD AUTO: 2.68 10*3/MM3 (ref 0.7–3.1)
LYMPHOCYTES NFR BLD AUTO: 36.6 % (ref 19.6–45.3)
M PNEUMO IGG SER IA-ACNC: NOT DETECTED
MAXIMAL PREDICTED HEART RATE: 167 BPM
MCH RBC QN AUTO: 30.2 PG (ref 26.6–33)
MCHC RBC AUTO-ENTMCNC: 33.7 G/DL (ref 31.5–35.7)
MCV RBC AUTO: 89.6 FL (ref 79–97)
MONOCYTES # BLD AUTO: 0.41 10*3/MM3 (ref 0.1–0.9)
MONOCYTES NFR BLD AUTO: 5.6 % (ref 5–12)
NEUTROPHILS NFR BLD AUTO: 4.04 10*3/MM3 (ref 1.7–7)
NEUTROPHILS NFR BLD AUTO: 55.2 % (ref 42.7–76)
NRBC BLD AUTO-RTO: 0 /100 WBC (ref 0–0.2)
NT-PROBNP SERPL-MCNC: <36 PG/ML (ref 0–900)
PERCENT MAX PREDICTED HR: 87.43 %
PLATELET # BLD AUTO: 243 10*3/MM3 (ref 140–450)
PMV BLD AUTO: 10.1 FL (ref 6–12)
POTASSIUM SERPL-SCNC: 3.3 MMOL/L (ref 3.5–5.2)
POTASSIUM SERPL-SCNC: 4.1 MMOL/L (ref 3.5–5.2)
PROT SERPL-MCNC: 6.9 G/DL (ref 6–8.5)
QT INTERVAL: 368 MS
QTC INTERVAL: 430 MS
RBC # BLD AUTO: 4.24 10*6/MM3 (ref 3.77–5.28)
RHINOVIRUS RNA SPEC NAA+PROBE: NOT DETECTED
RSV RNA NPH QL NAA+NON-PROBE: NOT DETECTED
SARS-COV-2 RNA NPH QL NAA+NON-PROBE: NOT DETECTED
SINUS: 2.5 CM
SODIUM SERPL-SCNC: 142 MMOL/L (ref 136–145)
STJ: 2.16 CM
STRESS BASELINE BP: NORMAL MMHG
STRESS BASELINE HR: 72 BPM
STRESS PERCENT HR: 103 %
STRESS POST ESTIMATED WORKLOAD: 10.5 METS
STRESS POST EXERCISE DUR MIN: 9 MIN
STRESS POST EXERCISE DUR SEC: 15 SEC
STRESS POST PEAK BP: NORMAL MMHG
STRESS POST PEAK HR: 146 BPM
STRESS TARGET HR: 142 BPM
TROPONIN T DELTA: ABNORMAL
TROPONIN T SERPL HS-MCNC: <6 NG/L
TROPONIN T SERPL HS-MCNC: <6 NG/L
WBC NRBC COR # BLD: 7.32 10*3/MM3 (ref 3.4–10.8)

## 2023-09-07 PROCEDURE — G0378 HOSPITAL OBSERVATION PER HR: HCPCS

## 2023-09-07 PROCEDURE — 86140 C-REACTIVE PROTEIN: CPT | Performed by: INTERNAL MEDICINE

## 2023-09-07 PROCEDURE — 93005 ELECTROCARDIOGRAM TRACING: CPT

## 2023-09-07 PROCEDURE — 99222 1ST HOSP IP/OBS MODERATE 55: CPT | Performed by: INTERNAL MEDICINE

## 2023-09-07 PROCEDURE — 84484 ASSAY OF TROPONIN QUANT: CPT | Performed by: INTERNAL MEDICINE

## 2023-09-07 PROCEDURE — 85652 RBC SED RATE AUTOMATED: CPT | Performed by: INTERNAL MEDICINE

## 2023-09-07 PROCEDURE — 85379 FIBRIN DEGRADATION QUANT: CPT | Performed by: INTERNAL MEDICINE

## 2023-09-07 PROCEDURE — 80053 COMPREHEN METABOLIC PANEL: CPT | Performed by: EMERGENCY MEDICINE

## 2023-09-07 PROCEDURE — 93306 TTE W/DOPPLER COMPLETE: CPT | Performed by: INTERNAL MEDICINE

## 2023-09-07 PROCEDURE — 85025 COMPLETE CBC W/AUTO DIFF WBC: CPT | Performed by: EMERGENCY MEDICINE

## 2023-09-07 PROCEDURE — 99284 EMERGENCY DEPT VISIT MOD MDM: CPT

## 2023-09-07 PROCEDURE — 71045 X-RAY EXAM CHEST 1 VIEW: CPT

## 2023-09-07 PROCEDURE — 93016 CV STRESS TEST SUPVJ ONLY: CPT | Performed by: INTERNAL MEDICINE

## 2023-09-07 PROCEDURE — 93017 CV STRESS TEST TRACING ONLY: CPT

## 2023-09-07 PROCEDURE — 36415 COLL VENOUS BLD VENIPUNCTURE: CPT

## 2023-09-07 PROCEDURE — 93306 TTE W/DOPPLER COMPLETE: CPT

## 2023-09-07 PROCEDURE — 83880 ASSAY OF NATRIURETIC PEPTIDE: CPT | Performed by: EMERGENCY MEDICINE

## 2023-09-07 PROCEDURE — 0202U NFCT DS 22 TRGT SARS-COV-2: CPT | Performed by: EMERGENCY MEDICINE

## 2023-09-07 PROCEDURE — 93018 CV STRESS TEST I&R ONLY: CPT | Performed by: INTERNAL MEDICINE

## 2023-09-07 PROCEDURE — 84132 ASSAY OF SERUM POTASSIUM: CPT | Performed by: INTERNAL MEDICINE

## 2023-09-07 PROCEDURE — 84484 ASSAY OF TROPONIN QUANT: CPT | Performed by: EMERGENCY MEDICINE

## 2023-09-07 RX ORDER — IBUPROFEN 400 MG/1
400 TABLET ORAL EVERY 8 HOURS PRN
Start: 2023-09-07

## 2023-09-07 RX ORDER — SODIUM CHLORIDE 0.9 % (FLUSH) 0.9 %
10 SYRINGE (ML) INJECTION AS NEEDED
Status: DISCONTINUED | OUTPATIENT
Start: 2023-09-07 | End: 2023-09-07 | Stop reason: HOSPADM

## 2023-09-07 RX ORDER — POTASSIUM CHLORIDE 750 MG/1
40 TABLET, FILM COATED, EXTENDED RELEASE ORAL ONCE
Status: COMPLETED | OUTPATIENT
Start: 2023-09-07 | End: 2023-09-07

## 2023-09-07 RX ADMIN — SODIUM CHLORIDE 1000 ML: 9 INJECTION, SOLUTION INTRAVENOUS at 03:40

## 2023-09-07 RX ADMIN — POTASSIUM CHLORIDE 40 MEQ: 750 TABLET, EXTENDED RELEASE ORAL at 08:44

## 2023-09-07 NOTE — ED NOTES
"Nursing report ED to floor  Aleena Wilder  53 y.o.  female    HPI :   Chief Complaint   Patient presents with    Shortness of Breath    Fatigue      Aleena Wilder is a 53 y.o. female who presents to the ED c/o shortness of breath with associated chest \"soreness\" that has been present now for the past 2 weeks.  She reports that the symptoms were present after staying in an air B&B while out of town 2 weeks ago.  She denies cough, fever/chills, back pain, extremity pain, abdominal pain, or nausea and vomiting.     Admitting doctor:   Nilam Roberts MD    Admitting diagnosis:   The primary encounter diagnosis was Atypical chest pain. Diagnoses of Dyspnea, unspecified type and Elevated troponin were also pertinent to this visit.    Code status:   Current Code Status       Date Active Code Status Order ID Comments User Context       Prior            Allergies:   Amoxicillin and Tramadol    Isolation:   No active isolations    Intake and Output    Intake/Output Summary (Last 24 hours) at 9/7/2023 0541  Last data filed at 9/7/2023 0428  Gross per 24 hour   Intake 1000 ml   Output --   Net 1000 ml       Weight:       09/07/23  0217   Weight: 54.4 kg (120 lb)       Most recent vitals:   Vitals:    09/07/23 0401 09/07/23 0405 09/07/23 0431 09/07/23 0501   BP: 101/69  109/72 106/72   Pulse: 73 72 63 69   Resp:       Temp:       TempSrc:       SpO2: 95% 96% 96% 95%   Weight:       Height:           Active LDAs/IV Access:   Lines, Drains & Airways       Active LDAs       Name Placement date Placement time Site Days    Peripheral IV 09/07/23 0301 Right Antecubital 09/07/23  0301  Antecubital  less than 1                    Labs (abnormal labs have a star):   Labs Reviewed   COMPREHENSIVE METABOLIC PANEL - Abnormal; Notable for the following components:       Result Value    Glucose 112 (*)     Potassium 3.3 (*)     All other components within normal limits    Narrative:     GFR Normal >60  Chronic Kidney Disease " <60  Kidney Failure <15     HIGH SENSITIVITIY TROPONIN T 2HR - Abnormal; Notable for the following components:    HS Troponin T 12 (*)     All other components within normal limits    Narrative:     High Sensitive Troponin T Reference Range:  <10.0 ng/L- Negative Female for AMI  <15.0 ng/L- Negative Male for AMI  >=10 - Abnormal Female indicating possible myocardial injury.  >=15 - Abnormal Male indicating possible myocardial injury.   Clinicians would have to utilize clinical acumen, EKG, Troponin, and serial changes to determine if it is an Acute Myocardial Infarction or myocardial injury due to an underlying chronic condition.        RESPIRATORY PANEL PCR W/ COVID-19 (SARS-COV-2) DARLYN/RAÚL/CHAYA/PAD/COR/MAD/[facility]OWER IN-HOUSE, NP SWAB IN UT/VT, 3-4 HR TAT - Normal    Narrative:     In the setting of a positive respiratory panel with a viral infection PLUS a negative procalcitonin without other underlying concern for bacterial infection, consider observing off antibiotics or discontinuation of antibiotics and continue supportive care. If the respiratory panel is positive for atypical bacterial infection (Bordetella pertussis, Chlamydophila pneumoniae, or Mycoplasma pneumoniae), consider antibiotic de-escalation to target atypical bacterial infection.   TROPONIN - Normal    Narrative:     High Sensitive Troponin T Reference Range:  <10.0 ng/L- Negative Female for AMI  <15.0 ng/L- Negative Male for AMI  >=10 - Abnormal Female indicating possible myocardial injury.  >=15 - Abnormal Male indicating possible myocardial injury.   Clinicians would have to utilize clinical acumen, EKG, Troponin, and serial changes to determine if it is an Acute Myocardial Infarction or myocardial injury due to an underlying chronic condition.        BNP (IN-HOUSE) - Normal    Narrative:     Among patients with dyspnea, NT-proBNP is highly sensitive for the detection of acute congestive heart failure. In addition NT-proBNP of <300 pg/ml  effectively rules out acute congestive heart failure with 99% negative predictive value.     CBC WITH AUTO DIFFERENTIAL - Normal   CBC AND DIFFERENTIAL    Narrative:     The following orders were created for panel order CBC & Differential.  Procedure                               Abnormality         Status                     ---------                               -----------         ------                     CBC Auto Differential[[id number]]        Normal              Final result                 Please view results for these tests on the individual orders.       EKG:   ECG 12 Lead Dyspnea   Preliminary Result   HEART RATE= 82  bpm   RR Interval= 732  ms   AL Interval= 115  ms   P Horizontal Axis= -16  deg   P Front Axis= 70  deg   QRSD Interval= 90  ms   QT Interval= 368  ms   QTcB= 430  ms   QRS Axis= 79  deg   T Wave Axis= 44  deg   - BORDERLINE ECG -   Sinus rhythm   Borderline short AL interval   Probable left atrial enlargement   Electronically Signed By:    Date and Time of Study: 2023-09-07 02:32:48          Meds given in ED:   Medications   sodium chloride 0.9 % flush 10 mL (has no administration in time range)   sodium chloride 0.9 % bolus 1,000 mL (0 mL Intravenous Stopped 9/7/23 0428)       Imaging results:  XR Chest 1 View    Result Date: 9/7/2023  No acute findings.  This report was finalized on 9/7/2023 3:25 AM by Dr. Cassandra Hurst M.D.       Ambulatory status:   - As tolerated    Social issues:   Social History     Socioeconomic History    Marital status:    Tobacco Use    Smoking status: Never    Smokeless tobacco: Never   Vaping Use    Vaping Use: Never used   Substance and Sexual Activity    Alcohol use: Yes     Alcohol/week: 2.0 standard drinks     Types: 2 Standard drinks or equivalent per week     Comment: social    Drug use: No    Sexual activity: Yes     Partners: Male       NIH Stroke Scale:       Allison Anand RN  09/07/23 05:41 EDT

## 2023-09-07 NOTE — PLAN OF CARE
Goal Outcome Evaluation:  Plan of Care Reviewed With: patient, spouse        Progress: improving  Outcome Evaluation: Patient alert and oriented. No complaints of pain. Up ad amrita. Completed ECHO and stress test. Ate without any difficulty. New order for discharge. IV removed and tele removed and all belongings gathered. Vital signs stable. Call light in reach. Safety maintained.

## 2023-09-07 NOTE — H&P
[facility] History And Physical Assessment/Discharge Summary    Patient Name: Aleena Wilder  Age/Sex: 53 y.o. female  : 1970  MRN: [id number]    Date of Hospital Admission: 2023  Date of Encounter Visit: 23  Encounter Provider: Shanel Dunbar MD  Place of Service: [facility] CARDIOLOGY  Patient Care Team:  Felix Quan MD as PCP - General (Internal Medicine)    Subjective:     Chief Complaint:  Chest pain    History of Present Illness:  Aleena Wilder is a 53 y.o. female with anxiety, obstructive sleep apnea on CPAP, allergic rhinitis, who is admitted with chest pain.    The patient presented to the emergency room yesterday with complaints of 2 weeks of chest discomfort.  The pain is across her chest and radiating around to her back although mainly focused on her left side.  This is associated with a sensation that she cannot take a deep breath.  She feels like her symptoms worsen when she is laying supine or wearing her CPAP.  Symptoms seem to improve when she sits up.  In the edition she reports more dyspnea and fatigue with exertion.  She reports the symptoms started around 2 weeks ago when she went to an outdoor concert in [place], [place].  She reports that the drive there was about 2 or 3 hours.  While there this temperatures were quite hot and she did feel like she got dehydrated.  She drank a lot of water after and felt a little bit better.  However she is continue to have this discomfort which has been more or less constant over the last couple weeks with some waxing and waning.  She reports that she and her  usually walk 2 or 3 times a week.  She did walk a couple of days ago but felt like she was struggling throughout her usual walk.    Following her arrival to the emergency room her initial troponin was normal at less than 6.  Repeat troponin bola to 12.  EKG was unremarkable.  Remainder of her lab work was unremarkable  except for a potassium of 3.3.  Due to the rise in her troponin she was admitted to our service for further evaluation.  Her potassium has not been replaced.    She denies any personal history of cardiac disease.  She reports that her father underwent a valve replacement in his 80s but otherwise no history of coronary artery disease.  It appears that she underwent an echocardiogram in 7/2021 for concerns for stroke.  This showed normal left ventricular systolic function and wall motion with an EF of 59%, normal diastolic function, no significant valvular disease and a normal agitated saline contrast study.    Past Medical History:  Past Medical History:   Diagnosis Date    Anxiety     Endometriosis     History of panic attacks     Kidney stones     Renal cyst     Sleep apnea     uses CPAP       Past Surgical History:   Procedure Laterality Date    APPENDECTOMY N/A 11/2006    COLONOSCOPY N/A 1/10/2022    Procedure: COLONOSCOPY INTO CECUM AND TI;  Surgeon: Romy Rodriguez MD;  Location: [facility] ENDOSCOPY;  Service: Gastroenterology;  Laterality: N/A;  PRE: FAMILY HX OF COLON CANCER  POST: HEMORRHOIDS    CYSTOSCOPY, RETROGRADE PYELOGRAM AND STENT INSERTION Right 7/31/2020    Procedure: CYSTO, STENT PLACEMENT, RIGHT RETROGRADE PYELOGRAM;  Surgeon: Nii Ca MD;  [place] [facility] [place] [facility]  Service: Urology;  Laterality: Right;    DIAGNOSTIC LAPAROSCOPY N/A 2006    EXTRACORPOREAL SHOCKWAVE LITHOTRIPSY (ESWL), STENT INSERTION/REMOVAL  01/06/2021    CALL'S     TOTAL ABDOMINAL HYSTERECTOMY WITH SALPINGO OOPHORECTOMY Bilateral 11/2006       Home Medications:   Medications Prior to Admission   Medication Sig Dispense Refill Last Dose    albuterol sulfate  (90 Base) MCG/ACT inhaler Inhale 2 puffs Every 4 (Four) Hours As Needed for Wheezing. 18 g 1     ALLERGY SERUM INJECTION Inject 1 mL under the skin into the appropriate area as directed 1 (One) Time Per Week.       calcium carbonate (OS-RHODA) 600 MG tablet  Take 1 tablet by mouth Daily.       Cholecalciferol (Vitamin D3) 50 MCG (2000 UT) tablet Take 1 tablet by mouth Daily.       Collagen-Vitamin C-Biotin (COLLAGEN 1500/C PO) Take  by mouth. Collagen Peptides       fexofenadine (ALLEGRA) 180 MG tablet Take 1 tablet by mouth Daily.       fluticasone (FLONASE) 50 MCG/ACT nasal spray 2 sprays into the nostril(s) as directed by provider Daily.       Fluticasone Furoate-Vilanterol (Breo Ellipta) 200-25 MCG/ACT inhaler Inhale 1 puff Daily. 60 each 5     ibandronate (Boniva) 150 MG tablet Take 1 tablet by mouth Every 30 (Thirty) Days. 4 tablet 3     Krill Oil 1000 MG capsule Take  by mouth.       methylPREDNISolone (MEDROL) 4 MG dose pack Take as directed on package instructions. 1 each 0     Multiple Vitamins-Minerals (MULTIVITAMIN PO) Take 1 tablet by mouth Daily.       Probiotic Product (PROBIOTIC-10 PO) Take  by mouth.          Allergies:  Allergies   Allergen Reactions    Amoxicillin Diarrhea and GI Intolerance     Abdominal cramps    Tramadol Dizziness and Hallucinations       Past Social History:  Social History     Socioeconomic History    Marital status:    Tobacco Use    Smoking status: Never    Smokeless tobacco: Never   Vaping Use    Vaping Use: Never used   Substance and Sexual Activity    Alcohol use: Yes     Alcohol/week: 2.0 standard drinks     Types: 2 Standard drinks or equivalent per week     Comment: social    Drug use: No    Sexual activity: Yes     Partners: Male       Past Family History:  Family History   Problem Relation Age of Onset    Cancer Other         colon    Diabetes Other     Hyperlipidemia Other     Hypertension Other     Other Other     Colon cancer Paternal Grandfather         Late 60s or early 70s    Colon cancer Cousin         Colon cancer, 50s, paternal cousin    Diabetes Mother     Heart attack Mother     Breast cancer Mother     Melanoma Mother     Alcohol abuse Father     COPD Father     Heart attack Father     Heart failure  Father     Hyperlipidemia Father     Melanoma Father     Colon polyps Father     No Known Problems Sister     No Known Problems Brother     No Known Problems Son     No Known Problems Son     No Known Problems Daughter     Malig Hyperthermia Neg Hx        Review of Systems:   All systems reviewed. Pertinent positives identified in HPI. All other systems are negative.    Objective:   Temp:  [97.1 °F (36.2 °C)-98.8 °F (37.1 °C)] 98.8 °F (37.1 °C)  Heart Rate:  [63-80] 78  Resp:  [18-19] 18  BP: ()/(69-85) 125/85    Intake/Output Summary (Last 24 hours) at 9/7/2023 0717  Last data filed at 9/7/2023 0428  Gross per 24 hour   Intake 1000 ml   Output --   Net 1000 ml     Body mass index is 22.81 kg/m².      09/07/23  0217 09/07/23  0630   Weight: 54.4 kg (120 lb) 56.6 kg (124 lb 11.2 oz)     Weight change:     Physical Exam:   General Appearance:    Alert, cooperative, in no acute distress   Head:    Normocephalic, without obvious abnormality, atraumatic   Eyes:            Lids and lashes normal, conjunctivae and sclerae normal, no   icterus, no pallor, corneas clear, PERRLA   Ears:    Ears appear intact with no abnormalities noted   Neck:   No adenopathy, supple, trachea midline, no thyromegaly, no   carotid bruit, no JVD   Lungs:     Clear to auscultation,respirations regular, even and unlabored    Heart:    Regular rhythm and normal rate, normal S1 and S2, no murmur, no gallop, no rub, no click   Chest Wall:    No abnormalities observed   Abdomen:     Normal bowel sounds, no masses, no organomegaly, soft        non-tender, non-distended, no guarding, no rebound  tenderness   Extremities:   Moves all extremities well, no edema, no cyanosis, no redness   Pulses:   Pulses palpable and equal bilaterally. Normal radial, carotid, femoral, dorsalis pedis and posterior tibial pulses bilaterally. Normal abdominal aorta   Skin:  Psychiatric:   No bleeding, bruising or rash    Alert and oriented x 3, normal mood and affect          Lab Review:   Results from last 7 days   Lab Units 09/07/23  0300   SODIUM mmol/L 142   POTASSIUM mmol/L 3.3*   CHLORIDE mmol/L 106   CO2 mmol/L 23.7   BUN mg/dL 11   CREATININE mg/dL 0.70   GLUCOSE mg/dL 112*   CALCIUM mg/dL 9.7   AST (SGOT) U/L 21   ALT (SGPT) U/L 19     Results from last 7 days   Lab Units 09/07/23  0439 09/07/23  0300   HSTROP T ng/L 12* <6     Results from last 7 days   Lab Units 09/07/23  0300   WBC 10*3/mm3 7.32   HEMOGLOBIN g/dL 12.8   HEMATOCRIT % 38.0   PLATELETS 10*3/mm3 243                   Invalid input(s): LDLCALC  Results from last 7 days   Lab Units 09/07/23  0300   PROBNP pg/mL <36.0       Imaging:  Imaging Results (Most Recent)       Procedure Component Value Units Date/Time    XR Chest 1 View [[id number]] Collected: 09/07/23 0325     Updated: 09/07/23 0329    Narrative:      SINGLE VIEW OF THE CHEST     HISTORY: Shortness of air     COMPARISON: July 16, 2021     FINDINGS:  Heart size is within normal limits. No pneumothorax, pleural effusion,  or acute infiltrate is seen.       Impression:      No acute findings.     This report was finalized on 9/7/2023 3:25 AM by Dr. Cassandra Hurst M.D.             I personally viewed and interpreted the patient's EKG    Assessment/Plan:     1.  Chest pain.  Atypical sounding symptoms.  Her troponins are really unremarkable.  EKG shows no significant change.  There may be some pleuritic sounding components to her symptoms.  2.  Fatigue/dyspnea on exertion.  3.  Hypokalemia.  Not replaced so far.  May be contributing to some of her symptoms.    - Although I have a low suspicion for ischemic heart disease as a cause of her symptoms will rule out further with a treadmill stress test.  - We will pursue further work-up to rule out other potential causes of her symptoms including pulmonary embolism and pericarditis.  We will check D-dimer, CRP, ESR, and an echocardiogram.  -In the meanwhile we will replace her potassium.  May see some  improvement in her symptoms with correction of her potassium levels.  -If her above work-up is unremarkable and her symptoms persist may still consider a short course of NSAIDS as empiric treatment.      Shanel Dunbar MD  09/07/23  07:17 EDT    ADDENDUM (0912):  Treadmill stress test negative for ischemia.  Echocardiogram unremarkable although did show a trivial pericardial effusion of unknown significance as both CRP and ESR normal.  Troponin is back to normal at 6.  Potassium has improved to 4.1 following replacement.  D-dimer was normal.  Called and discussed these findings with the patient.  Recommended that she could try a trial of NSAIDs to see if it helps her symptoms.  If her symptoms continued recommended follow up with Dr. Quan.  Otherwise I will see her again as needed.  We will discharge this afternoon.

## 2023-09-07 NOTE — OUTREACH NOTE
Prep Survey      Flowsheet Row Responses   [facility] facility patient discharged from? [place]   Is LACE score < 7 ? Yes   Eligibility Psychiatric   Date of Admission 09/07/23   Date of Discharge 09/07/23   Discharge Disposition Home or Self Care   Discharge diagnosis Elevated troponin level   Does the patient have one of the following disease processes/diagnoses(primary or secondary)? Other   Does the patient have Home health ordered? No   Is there a DME ordered? No   Prep survey completed? Yes            DAREN CARABALLO - Registered Nurse

## 2023-09-07 NOTE — ED TRIAGE NOTES
Pt presents via pv complaining of shortness of breath, chest soreness, fatigue for 2 weeks. Also reports some intermittent lightheadedness.

## 2023-09-07 NOTE — ED PROVIDER NOTES
" EMERGENCY DEPARTMENT ENCOUNTER    Room Number:  30/30  PCP: Felix Quan MD  Historian: Patient      HPI:  Chief Complaint: Shortness of breath  A complete HPI/ROS/PMH/PSH/SH/FH are unobtainable due to: None  Context: Aleena Wilder is a 53 y.o. female who presents to the ED c/o shortness of breath with associated chest \"soreness\" that has been present now for the past 2 weeks.  She reports that the symptoms were present after staying in an air B&B while out of town 2 weeks ago.  She denies cough, fever/chills, back pain, extremity pain, abdominal pain, or nausea and vomiting.            PAST MEDICAL HISTORY  Active Ambulatory Problems     Diagnosis Date Noted    Allergic rhinitis 02/09/2016    Eczema 02/09/2016    Mild intermittent asthma without complication 01/02/2018    Chronic midline low back pain without sciatica 01/02/2018    RENETTA (obstructive sleep apnea) 04/24/2018    Endometriosis 05/31/2018    Right ureteral calculus 07/31/2020    Vitamin D deficiency 01/21/2021    IGT (impaired glucose tolerance) 01/22/2021    Nonintractable migraine 07/17/2021    Hemiplegic migraine without status migrainosus, not intractable 07/21/2021    Family history of polyps in the colon 08/05/2021    Other osteoporosis without current pathological fracture 04/10/2023     Resolved Ambulatory Problems     Diagnosis Date Noted    Anxiety disorder 02/09/2016    Allergic dermatitis due to poison ivy 02/09/2016    Cough 02/09/2016    Febrile 02/09/2016    Spasm 02/09/2016    Infection of skin and subcutaneous tissue 02/09/2016    Infection of urinary tract 02/09/2016    Disease caused by virus 02/09/2016    Urethritis 02/09/2016    Daytime somnolence 04/24/2018    Stroke-like symptoms 07/16/2021     Past Medical History:   Diagnosis Date    Anxiety     History of panic attacks     Kidney stones     Renal cyst     Sleep apnea          PAST SURGICAL HISTORY  Past Surgical History:   Procedure Laterality Date    APPENDECTOMY N/A " 11/2006    COLONOSCOPY N/A 1/10/2022    Procedure: COLONOSCOPY INTO CECUM AND TI;  Surgeon: Romy Rodriguez MD;  Location: [facility] ENDOSCOPY;  Service: Gastroenterology;  Laterality: N/A;  PRE: FAMILY HX OF COLON CANCER  POST: HEMORRHOIDS    CYSTOSCOPY, RETROGRADE PYELOGRAM AND STENT INSERTION Right 7/31/2020    Procedure: CYSTO, STENT PLACEMENT, RIGHT RETROGRADE PYELOGRAM;  Surgeon: Nii Ca MD;  Location: [facility] OR [facility] [place], [place];  Service: Urology;  Laterality: Right;    DIAGNOSTIC LAPAROSCOPY N/A 2006    EXTRACORPOREAL SHOCKWAVE LITHOTRIPSY (ESWL), STENT INSERTION/REMOVAL  01/06/2021    CALL'S     TOTAL ABDOMINAL HYSTERECTOMY WITH SALPINGO OOPHORECTOMY Bilateral 11/2006         FAMILY HISTORY  Family History   Problem Relation Age of Onset    Cancer Other         colon    Diabetes Other     Hyperlipidemia Other     Hypertension Other     Other Other     Colon cancer Paternal Grandfather         Late 60s or early 70s    Colon cancer Cousin         Colon cancer, 50s, paternal cousin    Diabetes Mother     Heart attack Mother     Breast cancer Mother     Melanoma Mother     Alcohol abuse Father     COPD Father     Heart attack Father     Heart failure Father     Hyperlipidemia Father     Melanoma Father     Colon polyps Father     No Known Problems Sister     No Known Problems Brother     No Known Problems Son     No Known Problems Son     No Known Problems Daughter     Malig Hyperthermia Neg Hx          SOCIAL HISTORY  Social History     Socioeconomic History    Marital status:    Tobacco Use    Smoking status: Never    Smokeless tobacco: Never   Vaping Use    Vaping Use: Never used   Substance and Sexual Activity    Alcohol use: Yes     Alcohol/week: 2.0 standard drinks     Types: 2 Standard drinks or equivalent per week     Comment: social    Drug use: No    Sexual activity: Yes     Partners: Male         ALLERGIES  Amoxicillin and Tramadol        REVIEW OF SYSTEMS  Review of Systems   Constitutional:   Negative for fever.   HENT:  Negative for sore throat.    Eyes: Negative.    Respiratory:  Positive for shortness of breath. Negative for cough.    Cardiovascular:  Positive for chest pain.   Gastrointestinal:  Negative for abdominal pain, diarrhea and vomiting.   Genitourinary:  Negative for dysuria.   Musculoskeletal:  Negative for neck pain.   Skin:  Negative for rash.   Allergic/Immunologic: Negative.    Neurological:  Negative for weakness, numbness and headaches.   Hematological: Negative.    Psychiatric/Behavioral: Negative.     All other systems reviewed and are negative.         PHYSICAL EXAM  ED Triage Vitals [09/07/23 0217]   Temp Heart Rate Resp BP SpO2   97.1 °F (36.2 °C) 79 19 -- 98 %      Temp src Heart Rate Source Patient Position BP Location FiO2 (%)   Tympanic -- -- -- --       Physical Exam  Constitutional:       General: She is not in acute distress.     Appearance: Normal appearance. She is not ill-appearing or toxic-appearing.   HENT:      Head: Normocephalic and atraumatic.   Eyes:      Extraocular Movements: Extraocular movements intact.      Pupils: Pupils are equal, round, and reactive to light.   Cardiovascular:      Rate and Rhythm: Normal rate and regular rhythm.      Heart sounds: No murmur heard.    No friction rub. No gallop.   Pulmonary:      Effort: Pulmonary effort is normal.      Breath sounds: Normal breath sounds.   Abdominal:      General: Abdomen is flat. There is no distension.      Palpations: Abdomen is soft.      Tenderness: There is no abdominal tenderness.   Musculoskeletal:         General: No swelling or tenderness. Normal range of motion.      Cervical back: Normal range of motion and neck supple.   Skin:     General: Skin is warm and dry.   Neurological:      General: No focal deficit present.      Mental Status: She is alert and oriented to person, place, and time.      Sensory: No sensory deficit.      Motor: No weakness.   Psychiatric:         Mood and Affect:  Mood normal.         Behavior: Behavior normal.         Vital signs and nursing notes reviewed.          LAB RESULTS  Recent Results (from the past 24 hour(s))   ECG 12 Lead Dyspnea    Collection Time: 09/07/23  2:32 AM   Result Value Ref Range    QT Interval 368 ms    QTC Interval 430 ms   Respiratory Panel PCR w/COVID-19(SARS-CoV-2) DARLYN/RAÚL/CHAYA/PAD/COR/MAD/FLOWER In-House, NP Swab in UTM/VTM, 3-4 HR TAT - Swab, Nasopharynx    Collection Time: 09/07/23  2:47 AM    Specimen: Nasopharynx; Swab   Result Value Ref Range    ADENOVIRUS, PCR Not Detected Not Detected    Coronavirus 229E Not Detected Not Detected    Coronavirus HKU1 Not Detected Not Detected    Coronavirus NL63 Not Detected Not Detected    Coronavirus OC43 Not Detected Not Detected    COVID19 Not Detected Not Detected - Ref. Range    Human Metapneumovirus Not Detected Not Detected    Human Rhinovirus/Enterovirus Not Detected Not Detected    Influenza A PCR Not Detected Not Detected    Influenza B PCR Not Detected Not Detected    Parainfluenza Virus 1 Not Detected Not Detected    Parainfluenza Virus 2 Not Detected Not Detected    Parainfluenza Virus 3 Not Detected Not Detected    Parainfluenza Virus 4 Not Detected Not Detected    RSV, PCR Not Detected Not Detected    Bordetella pertussis pcr Not Detected Not Detected    Bordetella parapertussis PCR Not Detected Not Detected    Chlamydophila pneumoniae PCR Not Detected Not Detected    Mycoplasma pneumo by PCR Not Detected Not Detected   Comprehensive Metabolic Panel    Collection Time: 09/07/23  3:00 AM    Specimen: Blood   Result Value Ref Range    Glucose 112 (H) 65 - 99 mg/dL    BUN 11 6 - 20 mg/dL    Creatinine 0.70 0.57 - 1.00 mg/dL    Sodium 142 136 - 145 mmol/L    Potassium 3.3 (L) 3.5 - 5.2 mmol/L    Chloride 106 98 - 107 mmol/L    CO2 23.7 22.0 - 29.0 mmol/L    Calcium 9.7 8.6 - 10.5 mg/dL    Total Protein 6.9 6.0 - 8.5 g/dL    Albumin 4.2 3.5 - 5.2 g/dL    ALT (SGPT) 19 1 - 33 U/L    AST (SGOT) 21 1 -  32 U/L    Alkaline Phosphatase 61 39 - 117 U/L    Total Bilirubin 0.2 0.0 - 1.2 mg/dL    Globulin 2.7 gm/dL    A/G Ratio 1.6 g/dL    BUN/Creatinine Ratio 15.7 7.0 - 25.0    Anion Gap 12.3 5.0 - 15.0 mmol/L    eGFR 103.6 >60.0 mL/min/1.73   High Sensitivity Troponin T    Collection Time: 09/07/23  3:00 AM    Specimen: Blood   Result Value Ref Range    HS Troponin T <6 <10 ng/L   BNP    Collection Time: 09/07/23  3:00 AM    Specimen: Blood   Result Value Ref Range    proBNP <36.0 0.0 - 900.0 pg/mL   CBC Auto Differential    Collection Time: 09/07/23  3:00 AM    Specimen: Blood   Result Value Ref Range    WBC 7.32 3.40 - 10.80 10*3/mm3    RBC 4.24 3.77 - 5.28 10*6/mm3    Hemoglobin 12.8 12.0 - 15.9 g/dL    Hematocrit 38.0 34.0 - 46.6 %    MCV 89.6 79.0 - 97.0 fL    MCH 30.2 26.6 - 33.0 pg    MCHC 33.7 31.5 - 35.7 g/dL    RDW 12.7 12.3 - 15.4 %    RDW-SD 41.1 37.0 - 54.0 fl    MPV 10.1 6.0 - 12.0 fL    Platelets 243 140 - 450 10*3/mm3    Neutrophil % 55.2 42.7 - 76.0 %    Lymphocyte % 36.6 19.6 - 45.3 %    Monocyte % 5.6 5.0 - 12.0 %    Eosinophil % 1.0 0.3 - 6.2 %    Basophil % 1.2 0.0 - 1.5 %    Immature Grans % 0.4 0.0 - 0.5 %    Neutrophils, Absolute 4.04 1.70 - 7.00 10*3/mm3    Lymphocytes, Absolute 2.68 0.70 - 3.10 10*3/mm3    Monocytes, Absolute 0.41 0.10 - 0.90 10*3/mm3    Eosinophils, Absolute 0.07 0.00 - 0.40 10*3/mm3    Basophils, Absolute 0.09 0.00 - 0.20 10*3/mm3    Immature Grans, Absolute 0.03 0.00 - 0.05 10*3/mm3    nRBC 0.0 0.0 - 0.2 /100 WBC   High Sensitivity Troponin T 2Hr    Collection Time: 09/07/23  4:39 AM    Specimen: Blood   Result Value Ref Range    HS Troponin T 12 (H) <10 ng/L    Troponin T Delta         Ordered the above labs and reviewed the results.        RADIOLOGY  XR Chest 1 View    Result Date: 9/7/2023  SINGLE VIEW OF THE CHEST  HISTORY: Shortness of air  COMPARISON: July 16, 2021  FINDINGS: Heart size is within normal limits. No pneumothorax, pleural effusion, or acute infiltrate is  seen.      No acute findings.  This report was finalized on 9/7/2023 3:25 AM by Dr. Cassandra Hurst M.D.       Ordered the above noted radiological studies. Reviewed by me in PACS.            PROCEDURES  Procedures    EKG independently interpreted by myself as follows:    EKG          EKG time: 0232  Rhythm/Rate: NSR, 82  P waves and NE: nml  QRS, axis: nml, nml   ST and T waves: nml     Interpreted Contemporaneously by me, independently viewed  unchanged compared to prior 7/16/21            MEDICATIONS GIVEN IN ER  Medications   sodium chloride 0.9 % flush 10 mL (has no administration in time range)   sodium chloride 0.9 % bolus 1,000 mL (0 mL Intravenous Stopped 9/7/23 0428)                   MEDICAL DECISION MAKING, PROGRESS, and CONSULTS    All labs have been independently reviewed by me.  All radiology studies have been reviewed by me and I have also reviewed the radiology report.   EKG's independently viewed and interpreted by me.  Discussion below represents my analysis of pertinent findings related to patient's condition, differential diagnosis, treatment plan and final disposition.      Additional sources:  - Discussed/ obtained information from independent historians: History obtained from the patient herself at bedside.    - External (non-ED) record review: Upon medical records review, the patient was last seen and evaluated in the outpatient office of pulmonology/sleep medicine on 6/12/2023 in follow-up for her known obstructive sleep apnea.    - Chronic or social conditions impacting care: None reported    - Shared decision making: Admission decision based on shared conversations have between myself, the patient and spouse at bedside, as well as Dr. Roberts with cardiology.      Orders placed during this visit:  Orders Placed This Encounter   Procedures    Respiratory Panel PCR w/COVID-19(SARS-CoV-2) DARLYN/RAÚL/CHAYA/PAD/COR/MAD/FLOWER In-House, NP Swab in UTM/VTM, 3-4 HR TAT - Swab, Nasopharynx    XR Chest 1  View    Comprehensive Metabolic Panel    High Sensitivity Troponin T    BNP    CBC Auto Differential    High Sensitivity Troponin T 2Hr    Cardiology (on-call MD unless specified)    ECG 12 Lead Dyspnea    Insert Peripheral IV    Initiate Observation Status    CBC & Differential               Differential diagnosis includes but is not limited to:    Differential diagnosis includes but is not limited to acute coronary syndrome, pleurisy, pericarditis, costochondritis, pneumonia, acute bronchitis, COVID-19, or viral upper respiratory infection.      Independent interpretation of labs, radiology studies, and discussions with consultants:    Chest x-ray independently interpreted by myself with my interpretation showing no cardiomegaly nor area of edema, infiltrate, or pneumothorax.        ED Course as of 09/07/23 0549   Thu Sep 07, 2023   0515 On reevaluation, the patient is resting comfortably and without acute complaint.  I did inform her that her cardiac enzymes have elevated from essentially negative all the way to 12 as her second enzyme result.  Given that delta, we will discuss the case with cardiology likely for admission for further cardiovascular testing.  The patient is in agreement with that plan and all questions have been answered. [BM]   0526 The patient's presentation, elevated cardiac enzymes, as well as treatment plan was discussed at length with Dr. Roberts with cardiology.  She agrees to admit the patient to the hospital for further management and treatment. [BM]      ED Course User Index  [BM] Slim Fuller MD             DIAGNOSIS  Final diagnoses:   Atypical chest pain   Dyspnea, unspecified type   Elevated troponin         DISPOSITION  ADMISSION    Discussed treatment plan and reason for admission with pt/family and admitting physician.  Pt/family voiced understanding of the plan for admission for further testing/treatment as needed.               Latest Documented Vital Signs:  As of 05:49  EDT  BP- 119/82 HR- 79 Temp- 97.1 °F (36.2 °C) (Tympanic) O2 sat- 98%              --    Please note that portions of this were completed with a voice recognition program.       Note Disclaimer: At [facility], we believe that sharing information builds trust and better relationships. You are receiving this note because you are receiving care at [facility] or recently visited. It is possible you will see health information before a provider has talked with you about it. This kind of information can be easy to misunderstand. To help you fully understand what it means for your health, we urge you to discuss this note with your provider.             Slim Fuller MD  09/07/23 0549

## 2023-09-08 ENCOUNTER — TRANSITIONAL CARE MANAGEMENT TELEPHONE ENCOUNTER (OUTPATIENT)
Dept: CALL CENTER | Facility: HOSPITAL | Age: 53
End: 2023-09-08
Payer: COMMERCIAL

## 2023-09-08 NOTE — OUTREACH NOTE
Call Center TCM Note      Flowsheet Row Responses   [facility] patient discharged from? [address]   Does the patient have one of the following disease processes/diagnoses(primary or secondary)? Other   TCM attempt successful? Yes   Call start time 0943   Call end time 0946   Discharge diagnosis Elevated troponin level   Person spoke with today (if not patient) and relationship pt   Meds reviewed with patient/caregiver? Yes   Is the patient having any side effects they believe may be caused by any medication additions or changes? No   Does the patient have all medications ordered at discharge? Yes   Is the patient taking all medications as directed (includes completed medication regime)? Yes   Does the patient have an appointment with their PCP within 7-14 days of discharge? Yes  [9/11/2023 11:15 AM]   Psychosocial issues? No   Did the patient receive a copy of their discharge instructions? Yes   Nursing interventions Reviewed instructions with patient   What is the patient's perception of their health status since discharge? Improving   Is the patient/caregiver able to teach back signs and symptoms related to disease process for when to call PCP? Yes   Is the patient/caregiver able to teach back signs and symptoms related to disease process for when to call 911? Yes   Is the patient/caregiver able to teach back the hierarchy of who to call/visit for symptoms/problems? PCP, Specialist, Home health nurse, Urgent Care, ED, 911 Yes   If the patient is a current smoker, are they able to teach back resources for cessation? Not a smoker   TCM call completed? Yes   Wrap up additional comments Pt states she is doing better, and reports mild chest pain. Reviewed AVS/meds with pt. Pt verified PCP TCM hospital fu appt for 9/11/23.   Call end time 0946   Would this patient benefit from a Referral to [facility] Social Work? No   Is the patient interested in additional calls from an ambulatory ? No            Shari  ROMA Rico    9/8/2023, 09:48 EDT

## 2023-09-08 NOTE — CASE MANAGEMENT/SOCIAL WORK
Case Management Discharge Note      Final Note: Home         Selected Continued Care - Discharged on 9/7/2023 Admission date: 9/7/2023 - Discharge disposition: Home or Self Care      Destination    No services have been selected for the patient.                Durable Medical Equipment    No services have been selected for the patient.                Dialysis/Infusion    No services have been selected for the patient.                Home Medical Care    No services have been selected for the patient.                Therapy    No services have been selected for the patient.                Community Resources    No services have been selected for the patient.                Community & DME    No services have been selected for the patient.                    Transportation Services  Private: Car    Final Discharge Disposition Code: 01 - home or self-care

## 2023-09-11 ENCOUNTER — HOSPITAL ENCOUNTER (OUTPATIENT)
Dept: CT IMAGING | Facility: HOSPITAL | Age: 53
Discharge: HOME | End: 2023-09-11
Payer: COMMERCIAL

## 2023-09-11 ENCOUNTER — LAB (OUTPATIENT)
Dept: LAB | Facility: HOSPITAL | Age: 53
End: 2023-09-11
Payer: COMMERCIAL

## 2023-09-11 ENCOUNTER — OFFICE VISIT (OUTPATIENT)
Dept: INTERNAL MEDICINE | Facility: CLINIC | Age: 53
End: 2023-09-11
Payer: COMMERCIAL

## 2023-09-11 VITALS
WEIGHT: 127 LBS | HEIGHT: 62 IN | OXYGEN SATURATION: 97 % | BODY MASS INDEX: 23.37 KG/M2 | RESPIRATION RATE: 16 BRPM | SYSTOLIC BLOOD PRESSURE: 110 MMHG | DIASTOLIC BLOOD PRESSURE: 68 MMHG | HEART RATE: 84 BPM | TEMPERATURE: 98.4 F

## 2023-09-11 DIAGNOSIS — R06.02 SHORTNESS OF BREATH: ICD-10-CM

## 2023-09-11 DIAGNOSIS — R07.9 CHEST PAIN, UNSPECIFIED TYPE: ICD-10-CM

## 2023-09-11 DIAGNOSIS — Z23 NEED FOR VACCINATION: Primary | ICD-10-CM

## 2023-09-11 LAB — L PNEUMO1 AG UR QL IA: NEGATIVE

## 2023-09-11 PROCEDURE — 71275 CT ANGIOGRAPHY CHEST: CPT

## 2023-09-11 PROCEDURE — 87449 NOS EACH ORGANISM AG IA: CPT

## 2023-09-11 PROCEDURE — 25510000001 IOPAMIDOL PER 1 ML: Performed by: INTERNAL MEDICINE

## 2023-09-11 RX ORDER — RISEDRONATE SODIUM 150 MG/1
150 TABLET, FILM COATED ORAL
COMMUNITY
Start: 2023-06-16

## 2023-09-11 RX ORDER — PREDNISONE 5 MG/1
TABLET ORAL
Qty: 42 TABLET | Refills: 0 | Status: SHIPPED | OUTPATIENT
Start: 2023-09-11

## 2023-09-11 RX ADMIN — IOPAMIDOL 95 ML: 755 INJECTION, SOLUTION INTRAVENOUS at 12:44

## 2023-09-11 NOTE — NURSING NOTE
Dr. Rosen called stating it was Ok for the patient to leave; pt directed to main lobby to Outpatient Services for further follow up

## 2023-09-11 NOTE — PROGRESS NOTES
Transitional Care Follow Up Visit  Subjective     Aleena Wilder is a 53 y.o. female who presents for a transitional care management visit.    Within 48 business hours after discharge our office contacted her via telephone to coordinate her care and needs.      I reviewed and discussed the details of that call along with the discharge summary, hospital problems, inpatient lab results, inpatient diagnostic studies, and consultation reports with Aleena.     Current outpatient and discharge medications have been reconciled for the patient.  Reviewed by: Felix Quan MD          9/7/2023     5:28 PM   Date of TCM Phone Call   [facility]   Date of Admission 9/7/2023   Date of Discharge 9/7/2023   Discharge Disposition Home or Self Care     Risk for Readmission (LACE) Score: 3 (9/7/2023 11:16 AM)      History of Present Illness  Admitted to hospital on 9/7/2023 with 2 weeks of chest pain across the anterior chest rating around to the back, worse on the left side than the right.  Worse with taking a deep breath.  She had a tiny bump in her troponin and was kept overnight for a stress test which ended up being normal.  Cardiologist had a low index of suspicion for heart disease.  Was sent home on ibuprofen.  A TTE did show a trivial pericardial effusion.  Potassium was low and was replenished.  She really has not been taking any ibuprofen since discharge.   Course During Hospital Stay:  Treadmill stress test negative for ischemia.  Echocardiogram unremarkable although did show a trivial pericardial effusion of unknown significance as both CRP and ESR normal.  Troponin is back to normal at 6.  Potassium has improved to 4.1 following replacement.  D-dimer was normal.  Called and discussed these findings with the patient.  Recommended that she could try a trial of NSAIDs to see if it helps her symptoms.  If her symptoms continued recommended follow up with Dr. Quan.  Otherwise I will see her again as  needed.  We will discharge this afternoon.     The following portions of the patient's history were reviewed and updated as appropriate: allergies, current medications, past social history, and problem list.    Review of Systems   Constitutional:  Negative for chills and fever.   Respiratory:  Positive for shortness of breath. Negative for wheezing.    Cardiovascular:  Positive for chest pain.   Gastrointestinal:  Positive for diarrhea. Negative for constipation.     Objective   Physical Exam  Constitutional:       Appearance: Normal appearance.   Cardiovascular:      Pulses: Normal pulses.      Heart sounds: Normal heart sounds. No murmur heard.    No gallop.   Pulmonary:      Effort: Pulmonary effort is normal. No respiratory distress.      Breath sounds: No wheezing or rales.   Neurological:      Mental Status: She is alert.       Assessment & Plan   Diagnoses and all orders for this visit:    1. Need for vaccination (Primary)  -     Pneumococcal Conjugate Vaccine 20-Valent All    2. Chest pain, unspecified type      It with chest soreness and some shortness of breath that began about 2 weeks prior to admission.  That was around August 25, 2023 when she was down in [place] for a concert.  She stayed in a [employer] the night before and the room was cold.  She had a weird soreness across her chest but more on the left than the right.  Radiated around to the back.  She was admitted on 9/7/2023 by cardiology and had a full evaluation.  Discharged on 9/8/2023 some 4 days ago.  There is a trivial pericardial effusion on the TTE.  Stress test was negative in the hospital and she was then discharged with what was felt to be noncardiac chest pain.  She was prescribed some ibuprofen but is never taken it.  Realistically the symptoms sound more pulmonary than anything.  Chest x-rays reviewed from the hospital independently by me today and looks normal.  Lab work was fine other than a tiny bump in the troponin that was  normalized and a low potassium which was replaced.  Respiratory panel is negative in the emergency room.  We will check some Legionella test today.  Needs to start on ibuprofen at least for 100 mg 3 times daily for a week or 2.  We could still consider a CTA of the chest.  Her D-dimer was negative in the emergency room.  We will also proceed with a CTA of the chest to further rule out worsening effusion or pneumonia next on the chest x-ray or even a rare PE missed on D-dimer.  Lab work from the hospital reviewed included CBC, CMP, CRP, D-dimer, stress test and TTE.  Lab work later came back with a normal Legionella titer in her urine.  CTA of chest was unremarkable.  No evidence of DVT nor occult pneumonia.  We will try on a 12-day Pred pack and see if that resolves her symptoms.  I still wonder about some type of environmental challenges that occurred at her air B&[employer].    The above information was reviewed again today 09/11/23.  It continues to be accurate as reflected above and is unchanged.  History, physical and review of systems all reviewed and are unchanged.  Medications were reviewed today and continue the current dosing.

## 2023-12-11 NOTE — PROGRESS NOTES
"Sleep Medicine Follow-up visit Note    Name: Aleena Wilder  Age: 50 y.o.  Sex: female  :  1970  MRN: [id number]  Date of Service: 2021    Requesting Physician: Dewayne Angela Md  [address] [place] [id number]  .       History of Present Illness:   Aleena Wilder is a 50 y.o. female comes for a follow up visit.     Patients Polysomnography has revealed RENETTA with an AHI of 19.2 per hour of sleep. minimum SPO2  was 84 %. [place] Sleepiness Scale score prior to CPAP therapy was 13/24.    The patient has Moderately obstructive sleep apnea. The patient is on auto CPAP therapy at a mean pressure of 6.6 cms of water. Residual AHI 2.6/ sleep hour. Compliance data to indicate 100% usage for more than 4 hours with an average usage of 6 hours and 52 minutes.  [place] sleepiness scale score with CPAP therapy is 10/24 with CPAP therapy.     The patient is [employer] dream station auto CPAP which is on recall by the  and I have given the instructions to the patient regarding the same to register his device and to contact the [employer] company.    Review of Systems - Negative except anxiety.    PMH, Surgical Hx, current Medications, Allergies, Family Hx, Social Hx and problem list were reviewed and updated in the chart.    Objective:  Vitals:    21 0900   BP: 96/62   Pulse: 69   SpO2: 98%   Weight: 54 kg (119 lb)   Height: 157.5 cm (62\")    Body mass index is 21.77 kg/m².       GEN: No significant distress, the patient is well developed, well nourished.  NEURO: alert and oriented x 3,  The patient's polysomnography study in 2018 revealed:1. Moderately  severe Obstructive sleep apnea with an Apnea hypopnea index of 19.2 events per sleep hour. The lowest oxygen saturation was 84.0%. The oxygen was below 90% for 2.7% total sleep time. 2. With CPAP / BIPAP treatment, this study revealed improvement in sleep architecture, respiratory events and hypoxia and " Detail Level: Zone the best improvement was seen at a pressure of 8 centimeters of water.  At this pressure the Apnea/ hypopnea Index was 0.8 per sleep hour and the Oxygen Saturation remained 93.4% or above.     Assessment and PLAN:   The patient has moderately severe obstructive sleep apnea syndrome and is on CPAP. The patient is compliant and is benefiting from it.  I will continue present CPAP therapy and will see the patient for follow-up in one year.  The patient is [employer] dream station auto CPAP which is on recall by the  and I have given the instructions to the patient regarding the same to register his device and to contact the [employer] company.    I have discussed the findings of my evaluation with the patient at length, instructed the patient on basic sleep hygiene, stressing the importance of regular sleep schedule without naps and with 8 hours of sleep per night, avoiding alcohol and sedative medications, limiting caffeine consumption, and implementing a healthy diet and exercise program and not to drive if patient is sleepy.       Dewayne Angela MD  7/13/2021  10:36 EDT

## 2024-01-04 ENCOUNTER — OFFICE VISIT (OUTPATIENT)
Dept: INTERNAL MEDICINE | Facility: CLINIC | Age: 54
End: 2024-01-04
Payer: COMMERCIAL

## 2024-01-04 ENCOUNTER — HOSPITAL ENCOUNTER (OUTPATIENT)
Dept: GENERAL RADIOLOGY | Facility: HOSPITAL | Age: 54
Discharge: HOME | End: 2024-01-04
Admitting: INTERNAL MEDICINE
Payer: COMMERCIAL

## 2024-01-04 VITALS
SYSTOLIC BLOOD PRESSURE: 112 MMHG | DIASTOLIC BLOOD PRESSURE: 60 MMHG | RESPIRATION RATE: 18 BRPM | HEART RATE: 90 BPM | HEIGHT: 62 IN | WEIGHT: 130 LBS | BODY MASS INDEX: 23.92 KG/M2 | TEMPERATURE: 97.7 F | OXYGEN SATURATION: 99 %

## 2024-01-04 DIAGNOSIS — Z23 NEED FOR VACCINATION: ICD-10-CM

## 2024-01-04 DIAGNOSIS — M54.6 ACUTE BILATERAL THORACIC BACK PAIN: ICD-10-CM

## 2024-01-04 DIAGNOSIS — M81.8 OTHER OSTEOPOROSIS WITHOUT CURRENT PATHOLOGICAL FRACTURE: Primary | Chronic | ICD-10-CM

## 2024-01-04 PROCEDURE — 72072 X-RAY EXAM THORAC SPINE 3VWS: CPT

## 2024-01-04 PROCEDURE — 99214 OFFICE O/P EST MOD 30 MIN: CPT | Performed by: INTERNAL MEDICINE
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
## 2024-04-15 ENCOUNTER — PATIENT ROUNDING (BHMG ONLY) (OUTPATIENT)
Dept: INTERNAL MEDICINE | Facility: CLINIC | Age: 54
End: 2024-04-15

## 2024-04-15 ENCOUNTER — OFFICE VISIT (OUTPATIENT)
Dept: INTERNAL MEDICINE | Facility: CLINIC | Age: 54
End: 2024-04-15
Payer: COMMERCIAL

## 2024-04-15 VITALS
HEIGHT: 62 IN | RESPIRATION RATE: 18 BRPM | BODY MASS INDEX: 23.92 KG/M2 | DIASTOLIC BLOOD PRESSURE: 70 MMHG | HEART RATE: 77 BPM | OXYGEN SATURATION: 98 % | SYSTOLIC BLOOD PRESSURE: 108 MMHG | TEMPERATURE: 98 F | WEIGHT: 130 LBS

## 2024-04-15 DIAGNOSIS — Z00.00 ENCOUNTER FOR PREVENTIVE HEALTH EXAMINATION: Primary | ICD-10-CM

## 2024-04-15 DIAGNOSIS — J45.20 MILD INTERMITTENT ASTHMA WITHOUT COMPLICATION: Chronic | ICD-10-CM

## 2024-04-15 DIAGNOSIS — E55.9 VITAMIN D DEFICIENCY: ICD-10-CM

## 2024-04-15 DIAGNOSIS — R73.02 IGT (IMPAIRED GLUCOSE TOLERANCE): Chronic | ICD-10-CM

## 2024-04-15 DIAGNOSIS — M81.8 OTHER OSTEOPOROSIS WITHOUT CURRENT PATHOLOGICAL FRACTURE: Chronic | ICD-10-CM

## 2024-04-15 PROBLEM — R79.89 ELEVATED TROPONIN LEVEL: Status: RESOLVED | Noted: 2023-09-07 | Resolved: 2024-04-15

## 2024-04-15 PROCEDURE — 99396 PREV VISIT EST AGE 40-64: CPT | Performed by: INTERNAL MEDICINE

## 2024-04-15 NOTE — PROGRESS NOTES
April 15, 2024    Hello, may I speak with Aleena Wilder?    My name is Virginia Pierre       I am  with [facility]  [address] [place] [id number] [phone].    Before we get started may I verify your date of birth? 1970    I am calling to officially welcome you to our practice and ask about your recent visit. Is this a good time to talk? yes    Tell me about your visit with us. What things went well?  yes eerything is fine       We're always looking for ways to make our patients' experiences even better. Do you have recommendations on ways we may improve?  no    Overall were you satisfied with your first visit to our practice? yes       I appreciate you taking the time to speak with me today. Is there anything else I can do for you? no      Thank you, and have a great day.
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
## 2024-04-16 DIAGNOSIS — Z13.6 ENCOUNTER FOR SCREENING FOR CORONARY ARTERY DISEASE: Primary | ICD-10-CM

## 2024-04-16 LAB
25(OH)D3+25(OH)D2 SERPL-MCNC: 37.2 NG/ML (ref 30–100)
ALBUMIN SERPL-MCNC: 4.6 G/DL (ref 3.8–4.9)
ALBUMIN/GLOB SERPL: 1.9 {RATIO} (ref 1.2–2.2)
ALP SERPL-CCNC: 77 IU/L (ref 44–121)
ALT SERPL-CCNC: 17 IU/L (ref 0–32)
AST SERPL-CCNC: 19 IU/L (ref 0–40)
BASOPHILS # BLD AUTO: 0.1 X10E3/UL (ref 0–0.2)
BASOPHILS NFR BLD AUTO: 1 %
BILIRUB SERPL-MCNC: 0.4 MG/DL (ref 0–1.2)
BUN SERPL-MCNC: 13 MG/DL (ref 6–24)
BUN/CREAT SERPL: 18 (ref 9–23)
CALCIUM SERPL-MCNC: 9.2 MG/DL (ref 8.7–10.2)
CHLORIDE SERPL-SCNC: 105 MMOL/L (ref 96–106)
CHOLEST SERPL-MCNC: 249 MG/DL (ref 100–199)
CHOLEST/HDLC SERPL: 3.9 RATIO (ref 0–4.4)
CO2 SERPL-SCNC: 22 MMOL/L (ref 20–29)
CREAT SERPL-MCNC: 0.72 MG/DL (ref 0.57–1)
EGFRCR SERPLBLD CKD-EPI 2021: 100 ML/MIN/1.73
EOSINOPHIL # BLD AUTO: 0.1 X10E3/UL (ref 0–0.4)
EOSINOPHIL NFR BLD AUTO: 1 %
ERYTHROCYTE [DISTWIDTH] IN BLOOD BY AUTOMATED COUNT: 12.4 % (ref 11.7–15.4)
GLOBULIN SER CALC-MCNC: 2.4 G/DL (ref 1.5–4.5)
GLUCOSE SERPL-MCNC: 88 MG/DL (ref 70–99)
HBA1C MFR BLD: 6.3 % (ref 4.8–5.6)
HCT VFR BLD AUTO: 38.4 % (ref 34–46.6)
HDLC SERPL-MCNC: 64 MG/DL
HGB BLD-MCNC: 12.8 G/DL (ref 11.1–15.9)
IMM GRANULOCYTES # BLD AUTO: 0 X10E3/UL (ref 0–0.1)
IMM GRANULOCYTES NFR BLD AUTO: 0 %
LDLC SERPL CALC-MCNC: 158 MG/DL (ref 0–99)
LYMPHOCYTES # BLD AUTO: 2.6 X10E3/UL (ref 0.7–3.1)
LYMPHOCYTES NFR BLD AUTO: 43 %
MCH RBC QN AUTO: 29.1 PG (ref 26.6–33)
MCHC RBC AUTO-ENTMCNC: 33.3 G/DL (ref 31.5–35.7)
MCV RBC AUTO: 87 FL (ref 79–97)
MONOCYTES # BLD AUTO: 0.4 X10E3/UL (ref 0.1–0.9)
MONOCYTES NFR BLD AUTO: 6 %
NEUTROPHILS # BLD AUTO: 3 X10E3/UL (ref 1.4–7)
NEUTROPHILS NFR BLD AUTO: 49 %
PLATELET # BLD AUTO: 266 X10E3/UL (ref 150–450)
POTASSIUM SERPL-SCNC: 4.4 MMOL/L (ref 3.5–5.2)
PROT SERPL-MCNC: 7 G/DL (ref 6–8.5)
RBC # BLD AUTO: 4.4 X10E6/UL (ref 3.77–5.28)
SODIUM SERPL-SCNC: 142 MMOL/L (ref 134–144)
TRIGL SERPL-MCNC: 153 MG/DL (ref 0–149)
VLDLC SERPL CALC-MCNC: 27 MG/DL (ref 5–40)
WBC # BLD AUTO: 6.2 X10E3/UL (ref 3.4–10.8)

## 2024-05-25 ENCOUNTER — HOSPITAL ENCOUNTER (OUTPATIENT)
Dept: CT IMAGING | Facility: HOSPITAL | Age: 54
Discharge: HOME | End: 2024-05-25
Admitting: INTERNAL MEDICINE

## 2024-05-25 DIAGNOSIS — Z13.6 ENCOUNTER FOR SCREENING FOR CORONARY ARTERY DISEASE: ICD-10-CM

## 2024-05-25 PROCEDURE — 75571 CT HRT W/O DYE W/CA TEST: CPT

## 2024-05-28 ENCOUNTER — TELEPHONE (OUTPATIENT)
Dept: INTERNAL MEDICINE | Facility: CLINIC | Age: 54
End: 2024-05-28
Payer: COMMERCIAL

## 2024-05-28 RX ORDER — FLUTICASONE FUROATE AND VILANTEROL 200; 25 UG/1; UG/1
1 POWDER RESPIRATORY (INHALATION)
Qty: 60 EACH | Refills: 5 | Status: SHIPPED | OUTPATIENT
Start: 2024-05-28

## 2024-05-28 RX ORDER — AZITHROMYCIN 250 MG/1
TABLET, FILM COATED ORAL
Qty: 6 TABLET | Refills: 0 | Status: SHIPPED | OUTPATIENT
Start: 2024-05-28

## 2024-05-28 RX ORDER — ALBUTEROL SULFATE 90 UG/1
2 AEROSOL, METERED RESPIRATORY (INHALATION) EVERY 4 HOURS PRN
Qty: 18 G | Refills: 1 | Status: SHIPPED | OUTPATIENT
Start: 2024-05-28

## 2024-05-28 NOTE — TELEPHONE ENCOUNTER
Pt would like zpack for sinus allergy. She is having drainage and is on flonase and allegra for allergy. Please advise        [facility]/pharmacy #[id number] - [place], [place] - [address] AT [facility] - [phone]  - [phone]  [phone]

## 2024-05-28 NOTE — TELEPHONE ENCOUNTER
Okay to Jasvir.  1.  As directed.  As far as the EpiPen is concerned that is probably okay but I would like to have the diagnosis for which she is requesting it.  I do not have that listed on her problem sheet.

## 2024-05-28 NOTE — TELEPHONE ENCOUNTER
Pt requested refill of her Auv-Q dont see on the list and Fluticasone Furoate-Vilanterol (Breo Ellipta) 200-25 MCG/ACT inhaler    [facility]/pharmacy #[id number] - [facility], [place] - [address] AT [facility] - [phone]  - [phone]  [phone]

## 2024-07-22 ENCOUNTER — OFFICE VISIT (OUTPATIENT)
Dept: SLEEP MEDICINE | Facility: HOSPITAL | Age: 54
End: 2024-07-22
Payer: COMMERCIAL

## 2024-07-22 VITALS
WEIGHT: 126 LBS | HEART RATE: 81 BPM | SYSTOLIC BLOOD PRESSURE: 109 MMHG | BODY MASS INDEX: 23.19 KG/M2 | OXYGEN SATURATION: 98 % | HEIGHT: 62 IN | DIASTOLIC BLOOD PRESSURE: 73 MMHG

## 2024-07-22 DIAGNOSIS — G47.9 SLEEP DISTURBANCES: ICD-10-CM

## 2024-07-22 DIAGNOSIS — G47.33 OSA (OBSTRUCTIVE SLEEP APNEA): Primary | ICD-10-CM

## 2024-07-22 PROCEDURE — G0463 HOSPITAL OUTPT CLINIC VISIT: HCPCS

## 2024-07-22 NOTE — PROGRESS NOTES
[facility]  Telephone: [phone] / Fax: [phone] [place]  Telephone: [phone] / Fax: [phone] Rachel Baldwin    PCP: Felix Quan MD    Reason for visit: RENETTA f/u    Aleena Wilder is a 54 y.o.female  was last seen at [facility] sleep lab in June 2023.  Her life has been busy, she has been feeling tired during the day. Her 92 year old mother lives in the house with her, also has dogs that disturb sleep. Pressure 5-15 cm H2O. She would like to raise the starting pressure just a little to see if it helps improve sleep quality and daytime alertness. Her sleep schedule is 11:30pm-7am, ESS is 10.    SH- no tobacco, 1-2 alc per week, 1-2 tea per week.    ROS- +nasal congestion, +bloating    Current Medications:    Current Outpatient Medications:     albuterol sulfate  (90 Base) MCG/ACT inhaler, Inhale 2 puffs Every 4 (Four) Hours As Needed for Wheezing., Disp: 18 g, Rfl: 1    ALLERGY SERUM INJECTION, Inject 1 mL under the skin into the appropriate area as directed 1 (One) Time Per Week., Disp: , Rfl:     azithromycin (Zithromax Z-Jaspreet) 250 MG tablet, Take 2 tablets the first day, then 1 tablet daily for 4 days., Disp: 6 tablet, Rfl: 0    calcium carbonate (OS-RHODA) 600 MG tablet, Take 1 tablet by mouth Daily., Disp: , Rfl:     Cholecalciferol (Vitamin D3) 50 MCG (2000 UT) tablet, Take 1 tablet by mouth Daily., Disp: , Rfl:     fexofenadine (ALLEGRA) 180 MG tablet, Take 1 tablet by mouth Daily., Disp: , Rfl:     fluticasone (FLONASE) 50 MCG/ACT nasal spray, 2 sprays into the nostril(s) as directed by provider Daily., Disp: , Rfl:     Fluticasone Furoate-Vilanterol (Breo Ellipta) 200-25 MCG/ACT inhaler, Inhale 1 puff Daily., Disp: 60 each, Rfl: 5    Krill Oil 1000 MG capsule, Take  by mouth., Disp: , Rfl:     Multiple Vitamins-Minerals (MULTIVITAMIN PO), Take 1 tablet by mouth Daily., Disp: , Rfl:     Probiotic Product (PROBIOTIC-10 PO), Take  by mouth., Disp: , Rfl:      "risedronate (ACTONEL) 150 MG tablet, Take 1 tablet by mouth Every 30 (Thirty) Days., Disp: , Rfl:    also entered in Sleep Questionnaire    Patient  has a past medical history of Allergic (1992), Anxiety, Asthma, Endometriosis, History of panic attacks, Kidney stones, Low back pain (2010), Osteopenia (3/2023), Pneumonia, Renal cyst, and Sleep apnea.    I have reviewed the Past Medical History, Past Surgical History, Social History and Family History.    Vital Signs /73   Pulse 81   Ht 157.5 cm (62\")   Wt 57.2 kg (126 lb)   SpO2 98%   BMI 23.05 kg/m²  Body mass index is 23.05 kg/m².    General Alert and oriented. No acute distress noted   Pharynx/Throat Class  IV  Mallampati airway, large tongue, no evidence of redundant lateral pharyngeal tissue. No oral lesions. No thrush. Moist mucous membranes.   Head Normocephalic. Symmetrical. Atraumatic.    Nose No septal deviation. No drainage   Chest Wall Normal shape. Symmetric expansion with respiration. No tenderness.   Neck Trachea midline, no thyromegaly or adenopathy    Lungs Clear to auscultation bilaterally. No wheezes. No rhonchi. No rales. Respirations regular, even and unlabored.   Heart Regular rhythm and normal rate. Normal S1 and S2. No murmur   Abdomen Soft, non-tender and non-distended. Normal bowel sounds. No masses.   Extremities Moves all extremities well. No edema   Psychiatric Normal mood and affect.     Testing:  Download 4/23/24-7/21/24 100% use with average nightly use of 7 hours and 52 minutes auto CPAP 5-20cm H2O avg pr 10.1cm H2O, AHI 2.2    Study-  Study-Polysomnography has revealed RENETTA with an AHI of 19.2 per hour of sleep, minimum SPO2  was 84 %     Impression:  1. RENETTA (obstructive sleep apnea)    2. Sleep disturbances          Plan:  Change pressure to 6-15 cm H2O, call if pressures feel insufficient or excessive so that we make adjustments remotely. Renew CPAP accessories in regular intervals.     I reviewed download report and " original sleep study report with the patient.    Weight loss will be strongly beneficial to reduce the severity of sleep-disordered breathing.      Follow up with Dr. Benoit in one year    Thank you for allowing me to participate in your patient's care.      GOMEZ Martell  [facility]  Phone: [phone]      Part of this note may be an electronic transcription/translation of spoken language to printed text using the Dragon Dictation System.

## 2024-07-29 ENCOUNTER — TELEPHONE (OUTPATIENT)
Dept: SLEEP MEDICINE | Facility: HOSPITAL | Age: 54
End: 2024-07-29
Payer: COMMERCIAL

## 2024-10-18 PROBLEM — R35.0 URINARY FREQUENCY: Status: ACTIVE | Noted: 2024-10-18

## 2025-05-30 ENCOUNTER — HOSPITAL ENCOUNTER (EMERGENCY)
Facility: HOSPITAL | Age: 55
Discharge: HOME | End: 2025-05-30
Attending: EMERGENCY MEDICINE
Payer: COMMERCIAL

## 2025-05-30 ENCOUNTER — APPOINTMENT (OUTPATIENT)
Dept: CT IMAGING | Facility: HOSPITAL | Age: 55
End: 2025-05-30
Payer: COMMERCIAL

## 2025-05-30 VITALS
WEIGHT: 120 LBS | OXYGEN SATURATION: 97 % | HEART RATE: 77 BPM | HEIGHT: 62 IN | TEMPERATURE: 97.9 F | BODY MASS INDEX: 22.08 KG/M2 | RESPIRATION RATE: 16 BRPM | DIASTOLIC BLOOD PRESSURE: 73 MMHG | SYSTOLIC BLOOD PRESSURE: 113 MMHG

## 2025-05-30 DIAGNOSIS — S09.90XA ACUTE HEAD INJURY, INITIAL ENCOUNTER: Primary | ICD-10-CM

## 2025-05-30 PROCEDURE — 70450 CT HEAD/BRAIN W/O DYE: CPT

## 2025-05-30 PROCEDURE — 72125 CT NECK SPINE W/O DYE: CPT

## 2025-05-30 PROCEDURE — 99284 EMERGENCY DEPT VISIT MOD MDM: CPT

## 2025-05-30 NOTE — ED TRIAGE NOTES
Pt to ED with c/o fall yesterday and hit head. hard time eyes focusing, and headache. Pt ambulatory to triage. Denies LOC or blood thinners.

## 2025-05-30 NOTE — ED PROVIDER NOTES
EMERGENCY DEPARTMENT ENCOUNTER      PCP: Felix Quan MD  Patient Care Team:  Felix Quan MD as PCP - General (Internal Medicine)   Independent Historians: Patient    HPI:  Chief Complaint: Fall, HA   A complete HPI/ROS/PMH/PSH/SH/FH are unobtainable due to: None    Chronic or social conditions impacting patient care (social determinants of health): None    Context: Aleena Wilder is a 54 y.o. female with history of asthma, RENETTA who presents to the ED c/o acute head injury that occurred yesterday. Pt had lifted a heavy object that made her off balance and fall forward. She was unable to catch herself before hitting the right side of her head on house. She denies LOC. She has had worsened headache and some nausea today. No blood thinners.    Review of prior external notes and/or external test results outside of this encounter: CMP on 4/15/24 was unremarkable. Hemoglobin A1c on same date was 6.3.      PAST MEDICAL HISTORY  Active Ambulatory Problems     Diagnosis Date Noted    Allergic rhinitis 02/09/2016    Eczema 02/09/2016    Mild intermittent asthma without complication 01/02/2018    Chronic midline low back pain without sciatica 01/02/2018    RENETTA (obstructive sleep apnea) 04/24/2018    Endometriosis 05/31/2018    Right ureteral calculus 07/31/2020    Vitamin D deficiency 01/21/2021    IGT (impaired glucose tolerance) 01/22/2021    Nonintractable migraine 07/17/2021    Hemiplegic migraine without status migrainosus, not intractable 07/21/2021    Family history of polyps in the colon 08/05/2021    Other osteoporosis without current pathological fracture 04/10/2023    Urinary frequency 10/18/2024     Resolved Ambulatory Problems     Diagnosis Date Noted    Anxiety disorder 02/09/2016    Allergic dermatitis due to poison ivy 02/09/2016    Cough 02/09/2016    Febrile 02/09/2016    Spasm 02/09/2016    Infection of skin and subcutaneous tissue 02/09/2016    Infection of urinary tract 02/09/2016    Disease  caused by virus 02/09/2016    Urethritis 02/09/2016    Daytime somnolence 04/24/2018    Stroke-like symptoms 07/16/2021    Elevated troponin level 09/07/2023     Past Medical History:   Diagnosis Date    Allergic 1992    Anxiety     Asthma     History of panic attacks     Kidney stones     Low back pain 2010    Osteopenia 3/2023    Pneumonia     Renal cyst     Sleep apnea        The patient has started, but not completed, their COVID-19 vaccination series.    PAST SURGICAL HISTORY  Past Surgical History:   Procedure Laterality Date    APPENDECTOMY N/A 11/2006    COLONOSCOPY N/A 1/10/2022    Procedure: COLONOSCOPY INTO CECUM AND TI;  Surgeon: Romy Rodriguez MD;  Location: [facility] ENDOSCOPY;  Service: Gastroenterology;  Laterality: N/A;  PRE: FAMILY HX OF COLON CANCER  POST: HEMORRHOIDS    CYSTOSCOPY, RETROGRADE PYELOGRAM AND STENT INSERTION Right 7/31/2020    Procedure: CYSTO, STENT PLACEMENT, RIGHT RETROGRADE PYELOGRAM;  Surgeon: Nii Ca MD;  [place] [facility] [place] [facility] [place];  Service: Urology;  Laterality: Right;    DIAGNOSTIC LAPAROSCOPY N/A 2006    EXTRACORPOREAL SHOCKWAVE LITHOTRIPSY (ESWL), STENT INSERTION/REMOVAL  01/06/2021    CALL'S     TOTAL ABDOMINAL HYSTERECTOMY WITH SALPINGO OOPHORECTOMY Bilateral 11/2006         FAMILY HISTORY  Family History   Problem Relation Age of Onset    Cancer Other         colon    Diabetes Other     Hyperlipidemia Other     Hypertension Other     Other Other     Colon cancer Paternal Grandfather         Late 60s or early 70s    Colon cancer Cousin         Colon cancer, 50s, paternal cousin    Diabetes Mother     Heart attack Mother     Breast cancer Mother     Melanoma Mother     Anxiety disorder Mother     Cancer Mother         Breast cancer    Hypertension Mother     Kidney disease Mother     Alcohol abuse Father     COPD Father     Heart attack Father     Heart failure Father     Hyperlipidemia Father     Melanoma Father     Colon polyps Father     Stroke Father      No Known Problems Sister     No Known Problems Brother     No Known Problems Son     No Known Problems Son     No Known Problems Daughter     [place] Hyperthermia Neg Hx          SOCIAL HISTORY  Social History     Socioeconomic History    Marital status:    Tobacco Use    Smoking status: Never     Passive exposure: Never    Smokeless tobacco: Never   Vaping Use    Vaping status: Never Used   Substance and Sexual Activity    Alcohol use: Yes     Alcohol/week: 1.0 - 2.0 standard drink of alcohol     Comment: 1-2 drinks per week    Drug use: No    Sexual activity: Yes     Partners: Male     Birth control/protection: Post-menopausal, Hysterectomy         ALLERGIES  Amoxicillin and Tramadol        REVIEW OF SYSTEMS  Review of Systems   Eyes:  Negative for visual disturbance.   Gastrointestinal:  Positive for nausea. Negative for vomiting.   Musculoskeletal:  Negative for neck pain.   Neurological:  Positive for headaches. Negative for weakness and numbness.        All systems reviewed and negative except for those discussed in HPI.       PHYSICAL EXAM    I have reviewed the triage vital signs and nursing notes.    ED Triage Vitals   Temp Heart Rate Resp BP SpO2   05/30/25 1230 05/30/25 1230 05/30/25 1230 05/30/25 1233 05/30/25 1230   98.4 °F (36.9 °C) 87 15 120/70 97 %      Temp src Heart Rate Source Patient Position BP Location FiO2 (%)   05/30/25 1230 05/30/25 1230 -- -- --   Oral Monitor          Physical Exam  GENERAL: alert, no acute distress  SKIN: Warm, dry  HENT: Normocephalic, atraumatic, mild ttp right parietal scalp  without palpable hematoma  EYES: no scleral icterus, PERRL  CV: regular rhythm, regular rate  RESPIRATORY: normal effort, lungs clear  ABDOMEN: soft, nontender, nondistended  MUSCULOSKELETAL: no deformity  NEURO: alert, moves all extremities, follows commands, no focal deficits          LAB RESULTS  No results found for this or any previous visit (from the past 24 hours).    Ordered the above  labs and independently reviewed and interpreted the results.        RADIOLOGY  CT Head Without Contrast  CT Head Without Contrast, CT Cervical Spine Without Contrast  Result Date: 5/30/2025  HISTORY: Fell. Head injury. Neck pain.  CT OF THE BRAIN WITHOUT CONTRAST 05/30/2025.  Axial images were obtained through the brain without intravenous contrast.  The brain parenchyma and ventricular system appear within normal limits. No mass lesions, midline shift, intracranial hemorrhage or evidence of infarction is demonstrated.      No acute process.  CT OF THE CERVICAL SPINE WITHOUT CONTRAST  Spiral images were obtained from the skull base to the upper thoracic spine. Sagittal and coronal reconstruction images were reviewed.  There is mild degenerative disease of the C1-C2 articulation. There is slight reversal of the cervical lordosis. There is very minimal anterolisthesis of C3 on C4 and C4 on C5 with minimal spondylosis at C4-5. No other subluxation is seen. No cervical spine fractures are seen.  IMPRESSION: Minor cervical degenerative changes with no acute bony abnormality seen.  Radiation dose reduction techniques were utilized, including automated exposure control and exposure modulation based on body size.         I ordered the above noted radiological studies. Independently reviewed and interpreted by me.  See dictation for official radiology interpretation.      PROCEDURES    Procedures      MEDICATIONS GIVEN IN ER    Medications - No data to display      PROGRESS, DATA ANALYSIS, CONSULTS, AND MEDICAL DECISION MAKING    All labs have been independently reviewed and interpreted by me.  All radiology studies have been independently reviewed and interpreted by me and discussed with radiologist dictating the report.   EKG's independently reviewed and interpreted by me.  Discussion below represents my analysis of pertinent findings related to patient's condition, differential diagnosis, treatment plan and final  disposition.    Differential diagnosis for headache includes but is not limited to:    Migraine, tension headache, cluster headache, meningitis, ICH, acute obstructive hydrocephalus, intracranial mass, CO poisoning, CVA, cerebral venous thrombosis, HTN emergency, giant cell arteritis, idiopathic intracranial hypertension, acute glaucoma, acute sinusitis, cavernous sinus thrombosis, carotid artery dissection, trigeminal neuralgia, post LP headache, dehydration      ED Course as of 05/30/25 1412   Fri May 30, 2025   1401 CT Head Without Contrast  Radiology study independently interpreted by me and my findings are no large intracranial hemorrhage.   [DC]      ED Course User Index  [DC] Chantal Man PA             AS OF 14:12 EDT VITALS:    BP - 119/77  HR - 75  TEMP - 98.5 °F (36.9 °C) (Oral)  O2 SATS - 99%        DIAGNOSIS  Final diagnoses:   Acute head injury, initial encounter         DISPOSITION  ED Disposition       ED Disposition   Discharge    Condition   Stable    Comment   --                  Note Disclaimer: At [facility], we believe that sharing information builds trust and better relationships. You are receiving this note because you recently visited [facility]. It is possible you will see health information before a provider has talked with you about it. This kind of information can be easy to misunderstand. To help you fully understand what it means for your health, we urge you to discuss this note with your provider.         Chantal Man PA  05/30/25 1412

## 2025-05-30 NOTE — ED PROVIDER NOTES
MD ATTESTATION NOTE    The DWAYNE and I have discussed this patient's history, physical exam, and treatment plan.  I have reviewed the documentation and personally had a face to face interaction with the patient. I affirm the documentation and agree with the treatment and plan.  The attached note describes my personal findings.      I provided a substantive portion of the care of the patient.  I personally performed the physical exam in its entirety, and below are my findings.       Brief HPI: This patient is a 54-year-old female not on any blood thinning medications presenting to the emergency room today following a head injury.  She states that she fell  forward yesterday striking her head and face on a wall.  She denied any loss of consciousness but since that point has had a worsened headache with some associated nausea.  She denies neck pain, chest pain, back pain, confusion, or blurry vision.    PHYSICAL EXAM  ED Triage Vitals   Temp Heart Rate Resp BP SpO2   05/30/25 1230 05/30/25 1230 05/30/25 1230 05/30/25 1233 05/30/25 1230   98.4 °F (36.9 °C) 87 15 120/70 97 %      Temp src Heart Rate Source Patient Position BP Location FiO2 (%)   05/30/25 1230 05/30/25 1230 05/30/25 1254 05/30/25 1254 --   Oral Monitor Sitting Left arm          GENERAL: Resting comfortably and in no acute distress, nontoxic in appearance  HENT: nares patent  EYES: no scleral icterus  CV: regular rhythm, normal rate, no M/R/G  RESPIRATORY: normal effort, lungs clear bilaterally  ABDOMEN: soft, nontender, no rebound or guarding  MUSCULOSKELETAL: no deformity, no edema  NEURO: alert, moves all extremities, follows commands  PSYCH:  calm, cooperative  SKIN: warm, dry    Vital signs and nursing notes reviewed.      Differential diagnosis includes but is not limited to skull fracture, intracranial hemorrhage, intracranial mass effect, closed head injury, concussion, or cervical spine injury.      Plan: We will obtain CT scans of the patient's  head and cervical spine and reevaluate following.      Head CT independently interpreted by myself with my interpretation showing no skull fracture nor hemorrhage or mass effect.       Slim Fuller MD  05/30/25 3178

## 2025-08-18 DIAGNOSIS — E55.9 VITAMIN D DEFICIENCY: Primary | Chronic | ICD-10-CM

## 2025-08-18 DIAGNOSIS — Z00.00 ENCOUNTER FOR PREVENTIVE HEALTH EXAMINATION: ICD-10-CM

## 2025-08-19 PROCEDURE — 82365 CALCULUS SPECTROSCOPY: CPT | Performed by: STUDENT IN AN ORGANIZED HEALTH CARE EDUCATION/TRAINING PROGRAM

## 2025-08-20 ENCOUNTER — LAB REQUISITION (OUTPATIENT)
Dept: LAB | Facility: HOSPITAL | Age: 55
End: 2025-08-20
Payer: COMMERCIAL

## 2025-08-20 DIAGNOSIS — N20.1 CALCULUS OF URETER: ICD-10-CM

## 2025-08-26 ENCOUNTER — OFFICE VISIT (OUTPATIENT)
Dept: SLEEP MEDICINE | Facility: HOSPITAL | Age: 55
End: 2025-08-26
Payer: COMMERCIAL

## 2025-08-26 VITALS
BODY MASS INDEX: 22.63 KG/M2 | HEART RATE: 85 BPM | WEIGHT: 123 LBS | SYSTOLIC BLOOD PRESSURE: 104 MMHG | DIASTOLIC BLOOD PRESSURE: 71 MMHG | HEIGHT: 62 IN | OXYGEN SATURATION: 96 %

## 2025-08-26 DIAGNOSIS — G47.33 OBSTRUCTIVE SLEEP APNEA, ADULT: Primary | ICD-10-CM

## 2025-08-26 PROCEDURE — G0463 HOSPITAL OUTPT CLINIC VISIT: HCPCS

## 2025-08-30 LAB
CALCIUM OXALATE DIHYDRATE MFR STONE IR: 40 %
COLOR STONE: NORMAL
COM MFR STONE: 60 %
LABORATORY COMMENT REPORT: NORMAL
SIZE STONE: NORMAL MM
SPEC SOURCE SUBJ: NORMAL
WT STONE: 28 MG

## (undated) DEVICE — NITINOL WIRE WITH HYDROPHILIC TIP: Brand: SENSOR

## (undated) DEVICE — LOU CYSTO: Brand: MEDLINE INDUSTRIES, INC.

## (undated) DEVICE — TUBING, SUCTION, 1/4" X 10', STRAIGHT: Brand: MEDLINE

## (undated) DEVICE — PREP SOL POVIDONE/IODINE BT 4OZ

## (undated) DEVICE — LN SMPL CO2 SHTRM SD STREAM W/M LUER

## (undated) DEVICE — CATH URETRL FLXITP POLLACK STD 5F 70CM

## (undated) DEVICE — GLV SURG SIGNATURE ESSENTIAL PF LTX SZ8

## (undated) DEVICE — ADAPT CLN BIOGUARD AIR/H2O DISP

## (undated) DEVICE — KT ORCA ORCAPOD DISP STRL

## (undated) DEVICE — CANN O2 ETCO2 FITS ALL CONN CO2 SMPL A/ 7IN DISP LF

## (undated) DEVICE — SENSR O2 OXIMAX FNGR A/ 18IN NONSTR